# Patient Record
Sex: FEMALE | Race: BLACK OR AFRICAN AMERICAN | Employment: OTHER | URBAN - METROPOLITAN AREA
[De-identification: names, ages, dates, MRNs, and addresses within clinical notes are randomized per-mention and may not be internally consistent; named-entity substitution may affect disease eponyms.]

---

## 2017-02-03 ENCOUNTER — OFFICE VISIT (OUTPATIENT)
Dept: FAMILY MEDICINE CLINIC | Age: 72
End: 2017-02-03

## 2017-02-03 VITALS
WEIGHT: 222 LBS | TEMPERATURE: 98.8 F | HEIGHT: 65 IN | HEART RATE: 81 BPM | DIASTOLIC BLOOD PRESSURE: 80 MMHG | BODY MASS INDEX: 36.99 KG/M2 | SYSTOLIC BLOOD PRESSURE: 132 MMHG | RESPIRATION RATE: 18 BRPM | OXYGEN SATURATION: 97 %

## 2017-02-03 DIAGNOSIS — R60.9 DEPENDENT EDEMA: ICD-10-CM

## 2017-02-03 DIAGNOSIS — N18.30 CKD (CHRONIC KIDNEY DISEASE), STAGE III (HCC): ICD-10-CM

## 2017-02-03 DIAGNOSIS — I10 BENIGN ESSENTIAL HYPERTENSION: ICD-10-CM

## 2017-02-03 DIAGNOSIS — E11.9 DIABETES MELLITUS TYPE 2, NONINSULIN DEPENDENT (HCC): Primary | ICD-10-CM

## 2017-02-03 DIAGNOSIS — Z12.11 SCREENING FOR COLORECTAL CANCER: ICD-10-CM

## 2017-02-03 DIAGNOSIS — E78.00 HYPERCHOLESTEROLEMIA: ICD-10-CM

## 2017-02-03 DIAGNOSIS — Z12.12 SCREENING FOR COLORECTAL CANCER: ICD-10-CM

## 2017-02-03 NOTE — MR AVS SNAPSHOT
Visit Information Date & Time Provider Department Dept. Phone Encounter #  
 2/3/2017 10:00 AM Jamison Browning, [address] [phone] [id number] Upcoming Health Maintenance Date Due  
 MEDICARE YEARLY EXAM 8/5/2016 MICROALBUMIN Q1 2/9/2017 HEMOGLOBIN A1C Q6M 2/15/2017 EYE EXAM RETINAL OR DILATED Q1 5/5/2017 LIPID PANEL Q1 8/15/2017 FOOT EXAM Q1 2/3/2018 BREAST CANCER SCRN MAMMOGRAM 3/22/2018 GLAUCOMA SCREENING Q2Y 5/5/2018 COLONOSCOPY 6/4/2022 DTaP/Tdap/Td series (2 - Td) 2/3/2027 Allergies as of 2/3/2017  Review Complete On: 2/3/2017 By: Jamison Browning MD  
  
 Severity Noted Reaction Type Reactions Ace Inhibitors  02/28/2013    Other (comments) Renal insufficiency Hydrochlorothiazide  08/05/2015    Other (comments) Stopped d/t renal insufficiency, followed by Nephrologist  
  
Current Immunizations  Reviewed on 8/15/2016 Name Date Influenza High Dose Vaccine PF 12/7/2015 Pneumococcal Vaccine (Unspecified Type) 1/1/2010 Zoster 10/12/2012 Not reviewed this visit You Were Diagnosed With   
  
 Codes Comments Diabetes mellitus type 2, noninsulin dependent ([facility]    -  Primary ICD-10-CM: E11.9 ICD-9-CM: 250.00 Hypercholesterolemia     ICD-10-CM: E78.00 ICD-9-CM: 272.0 Benign essential hypertension     ICD-10-CM: I10 
ICD-9-CM: 401.1 CKD (chronic kidney disease), stage III     ICD-10-CM: N18.3 ICD-9-CM: 061. 3 Dependent edema     ICD-10-CM: R60.9 ICD-9-CM: 214. 3 Screening for colorectal cancer     ICD-10-CM: Z12.11, Z12.12 
ICD-9-CM: V76.51 Vitals BP Pulse Temp Resp Height(growth percentile) Weight(growth percentile) 132/80 81 98.8 °F (37.1 °C) (Oral) 18 5' 5\" (1.651 m) 222 lb (100.7 kg) LMP SpO2 BMI OB Status Smoking Status 07/25/2000 97% 36.94 kg/m2 Hysterectomy Never Smoker Vitals History BMI and BSA Data Body Mass Index Body Surface Area  
 36.94 kg/m 2 2.15 m 2 Preferred Pharmacy Pharmacy Name Phone Aki Castellanos ([id number] [facility] [phone] Your Updated Medication List  
  
   
This list is accurate as of: 2/3/17 10:52 AM.  Always use your most recent med list.  
  
  
  
  
 acetaminophen 500 mg tablet Commonly known as:  TYLENOL Take 500 mg by mouth daily as needed for Pain. amLODIPine 2.5 mg tablet Commonly known as:  Dave Chafe TAKE 1 TABLET EVERY DAY FOR HYPERTENSION  
  
 aspirin 81 mg tablet Take 81 mg by mouth daily. bumetanide 0.5 mg tablet Commonly known as:  Ernesto Hy Take 1 Tab by mouth daily as needed. Take with potassium rich food or beverage  Indications: EDEMA FISH OIL PO Take 1 Cap by mouth daily. metoprolol succinate 25 mg XL tablet Commonly known as:  TOPROL-XL  
TAKE 1 TABLET EVERY DAY FOR HYPERTENSION  
  
 VITAMIN D3 1,000 unit tablet Generic drug:  cholecalciferol Take  by mouth daily. We Performed the Following HEMOGLOBIN A1C WITH EAG [31519 CPT(R)]  DIABETES FOOT EXAM [HM7 Custom] MICROALBUMIN, UR, RAND W/ MICROALBUMIN/CREA RATIO [id number] CPT(R)] REFERRAL FOR COLONOSCOPY [FDN047 Custom] Comments:  
 Patient will call and schedule her 5 yr follow up colonoscopy w/ Dr Pamula Baumgarten Referral Information Referral ID Referred By Referred To  
  
 [id number] MYLENE MARS [facility] [address] [place] [address] Visits Status Start Date End Date 1 New Request 2/3/17 2/3/18 If your referral has a status of pending review or denied, additional information will be sent to support the outcome of this decision. Patient Instructions Learning About Diabetes Food Guidelines Your Care Instructions Meal planning is important to manage diabetes.  It helps keep your blood sugar at a target level (which you set with your doctor). You don't have to eat special foods. You can eat what your family eats, including sweets once in a while. But you do have to pay attention to how often you eat and how much you eat of certain foods. You may want to work with a dietitian or a certified diabetes educator (CDE) to help you plan meals and snacks. A dietitian or CDE can also help you lose weight if that is one of your goals. What should you know about eating carbs? Managing the amount of carbohydrate (carbs) you eat is an important part of healthy meals when you have diabetes. Carbohydrate is found in many foods. · Learn which foods have carbs. And learn the amounts of carbs in different foods. ¨ Bread, cereal, pasta, and rice have about 15 grams of carbs in a serving. A serving is 1 slice of bread (1 ounce), ½ cup of cooked cereal, or 1/3 cup of cooked pasta or rice. ¨ Fruits have 15 grams of carbs in a serving. A serving is 1 small fresh fruit, such as an apple or orange; ½ of a banana; ½ cup of cooked or canned fruit; ½ cup of fruit juice; 1 cup of melon or raspberries; or 2 tablespoons of dried fruit. ¨ Milk and no-sugar-added yogurt have 15 grams of carbs in a serving. A serving is 1 cup of milk or 2/3 cup of no-sugar-added yogurt. ¨ Starchy vegetables have 15 grams of carbs in a serving. A serving is ½ cup of mashed potatoes or sweet potato; 1 cup winter squash; ½ of a small baked potato; ½ cup of cooked beans; or ½ cup cooked corn or green peas. · Learn how much carbs to eat each day and at each meal. A dietitian or CDE can teach you how to keep track of the amount of carbs you eat. This is called carbohydrate counting. · If you are not sure how to count carbohydrate grams, use the Plate Method to plan meals. It is a good, quick way to make sure that you have a balanced meal. It also helps you spread carbs throughout the day. ¨ Divide your plate by types of foods. Put non-starchy vegetables on half the plate, meat or other protein food on one-quarter of the plate, and a grain or starchy vegetable in the final quarter of the plate. To this you can add a small piece of fruit and 1 cup of milk or yogurt, depending on how many carbs you are supposed to eat at a meal. 
· Try to eat about the same amount of carbs at each meal. Do not \"save up\" your daily allowance of carbs to eat at one meal. 
· Proteins have very little or no carbs per serving. Examples of proteins are beef, chicken, turkey, fish, eggs, tofu, cheese, cottage cheese, and peanut butter. A serving size of meat is 3 ounces, which is about the size of a deck of cards. Examples of meat substitute serving sizes (equal to 1 ounce of meat) are 1/4 cup of cottage cheese, 1 egg, 1 tablespoon of peanut butter, and ½ cup of tofu. How can you eat out and still eat healthy? · Learn to estimate the serving sizes of foods that have carbohydrate. If you measure food at home, it will be easier to estimate the amount in a serving of restaurant food. · If the meal you order has too much carbohydrate (such as potatoes, corn, or baked beans), ask to have a low-carbohydrate food instead. Ask for a salad or green vegetables. · If you use insulin, check your blood sugar before and after eating out to help you plan how much to eat in the future. · If you eat more carbohydrate at a meal than you had planned, take a walk or do other exercise. This will help lower your blood sugar. What else should you know? · Limit saturated fat, such as the fat from meat and dairy products. This is a healthy choice because people who have diabetes are at higher risk of heart disease. So choose lean cuts of meat and nonfat or low-fat dairy products. Use olive or canola oil instead of butter or shortening when cooking. · Don't skip meals.  Your blood sugar may drop too low if you skip meals and take insulin or certain medicines for diabetes. · Check with your doctor before you drink alcohol. Alcohol can cause your blood sugar to drop too low. Alcohol can also cause a bad reaction if you take certain diabetes medicines. Follow-up care is a key part of your treatment and safety. Be sure to make and go to all appointments, and call your doctor if you are having problems. It's also a good idea to know your test results and keep a list of the medicines you take. Where can you learn more? Go to http://yumi-raquel.info/. Enter X280 in the search box to learn more about \"Learning About Diabetes Food Guidelines. \" Current as of: May 23, 2016 Content Version: 11.1 © 4287-3234 [employer]. Care instructions adapted under license by [employer] (which disclaims liability or warranty for this information). If you have questions about a medical condition or this instruction, always ask your healthcare professional. [address] any warranty or liability for your use of this information. Introducing [facility]! Ania White introduces [employer] patient portal. Now you can access parts of your medical record, email your doctor's office, and request medication refills online. 1. In your internet browser, go to [email or web address] 2. Click on the First Time User? Click Here link in the Sign In box. You will see the New Member Sign Up page. 3. Enter your [employer] Access Code exactly as it appears below. You will not need to use this code after youve completed the sign-up process. If you do not sign up before the expiration date, you must request a new code. · [employer] Access Code: [email or web address] Expires: 5/4/2017 10:35 AM 
 
4. Enter the last four digits of your Social Security Number (xxxx) and Date of Birth (mm/dd/yyyy) as indicated and click Submit. You will be taken to the next sign-up page. 5. Create a [employer] ID. This will be your [employer] login ID and cannot be changed, so think of one that is secure and easy to remember. 6. Create a [employer] password. You can change your password at any time. 7. Enter your Password Reset Question and Answer. This can be used at a later time if you forget your password. 8. Enter your e-mail address. You will receive e-mail notification when new information is available in [address] 9. Click Sign Up. You can now view and download portions of your medical record. 10. Click the Download Summary menu link to download a portable copy of your medical information. If you have questions, please visit the Frequently Asked Questions section of the [employer] website. Remember, [employer] is NOT to be used for urgent needs. For medical emergencies, dial 911. Now available from your iPhone and Android! Please provide this summary of care documentation to your next provider. Your primary care clinician is listed as MYLENE MARS. If you have any questions after today's visit, please call [phone].

## 2017-02-03 NOTE — LETTER
February 3, 2017 Amna Phelps [address] [place] [address] Dear Yosi Gift: 
Thank you for requesting access to [employer] Please follow the instructions below to securely access and download your online medical record. [employer] allows you to send messages to your doctor, view your test results, renew your prescriptions, schedule appointments, and more. How Do I Sign Up? 1. In your internet browser, go to [email or web address]. 2. Click on the First Time User? Click Here link in the Sign In box. You will see the New Member Sign Up page. 3. Enter your [employer] Access Code exactly as it appears below. You will not need to use this code after youve completed the sign-up process. If you do not sign up before the expiration date, you must request a new code. [employer] Access Code: [email or web address] Expires: 5/4/2017 10:35 AM  
 
4. Enter the last four digits of your Social Security Number (xxxx) and Date of Birth (mm/dd/yyyy) as indicated and click Submit. You will be taken to the next sign-up page. 5. Create a [employer] ID. This will be your [employer] login ID and cannot be changed, so think of one that is secure and easy to remember. 6. Create a [employer] password. You can change your password at any time. 7. Enter your Password Reset Question and Answer. This can be used at a later time if you forget your password. 8. Enter your e-mail address. You will receive e-mail notification when new information is available in [address] 9. Click Sign Up. You can now view and download portions of your medical record. 10. Click the Download Summary menu link to download a portable copy of your medical information. Additional Information If you have questions, please visit the Frequently Asked Questions section of the [employer] website at [email or web address] Remember, [employer] is NOT to be used for urgent needs. For medical emergencies, dial 911. Now available from your iPhone and Android! Sincerely, [employer]

## 2017-02-03 NOTE — PATIENT INSTRUCTIONS

## 2017-02-03 NOTE — PROGRESS NOTES
HISTORY OF PRESENT ILLNESS  Brock Paris is a 66 y.o. female. HPI  Fasting follow up diabetes, cholesterol, hypertension, labs and medication check. Does not check BS's. Not doing anything special w/ diet or exercise. Wt is unchanged. BP is up some for her today. She only takes the Bumex once about every 2 weeks now bc she doesn't like the inconvenience of having to urinate so much when she takes it. Her feet and ankles are puffy  Review of Systems   Eyes: Negative. Respiratory: Negative for cough and shortness of breath. Cardiovascular: Negative for chest pain and palpitations. Gastrointestinal: Negative. Negative for abdominal pain, constipation, diarrhea, nausea and vomiting. Genitourinary: Negative. Musculoskeletal: Negative for myalgias. Neurological: Negative for dizziness, tingling, sensory change and headaches.      Problem List  Date Reviewed: 2/3/2017          Codes Class Noted    Diabetes mellitus type 2, noninsulin dependent ([facility] ICD-10-CM: E11.9  ICD-9-CM: 250.00  8/15/2016    Overview Signed 8/15/2016 10:36 AM by Isabelle Carballo MD     5/2012, HgbA1c 6.5             Benign essential hypertension ICD-10-CM: I10  ICD-9-CM: 401.1  5/16/2016    Overview Signed 5/16/2016 11:03 AM by Isabelle Carballo MD     DX 4/09             CKD (chronic kidney disease), stage III ICD-10-CM: N18.3  ICD-9-CM: 585.3  2/28/2013    Overview Addendum 2/9/2016 10:57 AM by Isabelle Carballo MD     2012; Dr Lidia Drake al; Renal US 2/2013             Vitamin D deficiency ICD-10-CM: E55.9  ICD-9-CM: 268.9  2/28/2013    Overview Signed 2/28/2013  9:25 AM by Isabelle Carballo MD     1/2013; weekly rx vit D per Nephrology             Dyspepsia & Heartburn ICD-10-CM: R10.13  ICD-9-CM: 536.8  5/16/2012    Overview Signed 5/16/2012  9:00 AM by Isabelle Carballo MD     2012, cardiac w/u negative; improved w/ Pepcid             Impaired fasting glucose ICD-10-CM: R73.01  ICD-9-CM: 790.21 2012    Overview Signed 2012  9:07 AM by Shelli Gannon MD                  Chest pain, unspecified ICD-10-CM: R07.9  ICD-9-CM: 786.50  2012    Overview Signed 2012  9:00 AM by Shelli Gannon MD     2012, Dr Stephanie Ortega; Cardiac cath negative, GI related             Hypercholesterolemia ICD-10-CM: E78.00  ICD-9-CM: 272.0  2011        Mild renal insufficiency ICD-10-CM: N28.9  ICD-9-CM: 593.9  2011    Overview Signed 2013  9:22 AM by Shelli Gannon MD                  Bilateral Dependent Foot & Ankle Edema, L>R ICD-10-CM: R60.9  ICD-9-CM: 782.3  2010         (normal spontaneous vaginal delivery) x3 ICD-10-CM: O80  ICD-9-CM: 650  Unknown    Overview Signed 5/3/2010 10:46 AM by Shelli Gannon MD      x4, 1 fetal demise after delivery at 11mos             Miscarriage x 2 ICD-10-CM: O03.9  ICD-9-CM: 634.90  Unknown        S/P D&C (status post dilation and curettage) ICD-10-CM: L51.144  ICD-9-CM: V45.89  Unknown    Overview Signed 2010 12:08 PM by Shelli Gannon MD     X1, SAB  x1, AUB             S/p laparoscopic cholecystectomy gallstones ICD-10-CM: Z90.49  ICD-9-CM: V45.89  Unknown    Overview Signed 5/3/2010 10:48 AM by Shelli Gannon MD                  S/p Rt bunionectomy ICD-10-CM: N38.387  ICD-9-CM: V45.89  Unknown        Lt Ankle edema, s/p surgery ICD-10-CM: M25.473  ICD-9-CM: 176. 3  Unknown    Overview Signed 5/3/2010 10:49 AM by Shelli Gannon MD     Early 0360'O                 Past Surgical History   Procedure Laterality Date    Hx partial hysterectomy  ~     AUB, benign disease    Hx knee replacement  2010     Right TKR; Dr Tamica Kiran Hx dilation and curettage  x2     SAB x 1, AUB x1    Pr lap,cholecystectomy       gallstones    Hx bunionectomy Right      right    Pr leg/ankle surgery proc unlisted       left ankle surgery    Colonoscopy  ~     normal, f/u 10 yrs; Dr Tonio Bernal mammogram screen bilat  annually     [facility]    Cardiac catheterization  1/27/2012     normal, Dr Anderson, but given NTG just in case to use prn    Colonoscopy  06/04/2012     Dr Frank Brown, ok per pt except diverticulosis, small benign polyp; repeat 5 yrs    Hx bunionectomy Left 12/15/2015     Dr. Rachell Loaiza       Current Outpatient Prescriptions   Medication Sig    metoprolol succinate (TOPROL-XL) 25 mg XL tablet TAKE 1 TABLET EVERY DAY FOR HYPERTENSION    amLODIPine (NORVASC) 2.5 mg tablet TAKE 1 TABLET EVERY DAY FOR HYPERTENSION    bumetanide (BUMEX) 0.5 mg tablet Take 1 Tab by mouth daily as needed. Take with potassium rich food or beverage  Indications: EDEMA    acetaminophen (TYLENOL) 500 mg tablet Take 500 mg by mouth daily as needed for Pain.  cholecalciferol (VITAMIN D3) 1,000 unit tablet Take  by mouth daily.  DOCOSAHEXANOIC ACID/EPA (FISH OIL PO) Take 1 Cap by mouth daily.  aspirin 81 mg tablet Take 81 mg by mouth daily. No current facility-administered medications for this visit.       Allergies   Allergen Reactions    Ace Inhibitors Other (comments)     Renal insufficiency    Hydrochlorothiazide Other (comments)     Stopped d/t renal insufficiency, followed by Nephrologist     Social History     Social History    Marital status:      Spouse name: N/A    Number of children: 3    Years of education: N/A     Occupational History    Retired [employer]      Social History Main Topics    Smoking status: Never Smoker    Smokeless tobacco: Never Used    Alcohol use Yes      Comment: rarely    Drug use: No    Sexual activity: Yes     Partners: Male     Other Topics Concern     Service No    Blood Transfusions No    Caffeine Concern No     1 bottle of 16 oz of Pepsi a day; this is down considerably for her    Occupational Exposure No    Hobby Hazards No    Sleep Concern No    Stress Concern No    Weight Concern No    Special Diet No    Back Care No    Exercise No    Bike Helmet Yes    Seat Belt Yes    Self-Exams Yes     Social History Narrative    Lives at home w/ her , son and grand daughter (baby), 2 story home     Immunization History   Administered Date(s) Administered    Influenza High Dose Vaccine PF 2015    Pneumococcal Vaccine (Unspecified Type) 2010    Zoster 10/12/2012       Family History   Problem Relation Age of Onset    Hypertension Mother     Arthritis-osteo Mother      B TKR   [facility] Arthritis-rheumatoid Mother      juvenile    Breast Cancer Mother 80    Other Mother      Diverticulitis    Hypertension Father    [facility] Diabetes Father       age 80 kidney failure; dialysis pt    Diabetes Sister     Cancer Brother       age 46 \"bone cancer\"    Alcohol abuse Brother       of cirrhosis age 58    Heart Disease Maternal Grandmother       at 80    Heart Disease Paternal Grandmother       at 80    Heart Attack Paternal Grandfather       in his 66's   [facility] Anesth Problems Neg Hx      Visit Vitals    /82 (BP 1 Location: Left arm, BP Patient Position: Sitting) Repeated end of exam 132/80    Pulse 81    Temp 98.8 °F (37.1 °C) (Oral)    Resp 18    Ht 5' 5\" (1.651 m)    Wt 222 lb (100.7 kg)    LMP 2000    SpO2 97%    BMI 36.94 kg/m2       Physical Exam   Constitutional: No distress. Neck: Carotid bruit is not present. Cardiovascular: Normal rate, regular rhythm and normal heart sounds. No murmur heard. Pulmonary/Chest: Effort normal and breath sounds normal. No respiratory distress. Abdominal: Soft. Bowel sounds are normal. She exhibits no distension and no mass. There is no tenderness. Musculoskeletal:   Moderate non pitting B foot edema, mild pitting edema B ankles. Skin warm, intact, no lesions or wounds. Strong distal pulses. Normal monofilament testing. Vitals reviewed. ASSESSMENT and PLAN    ICD-10-CM ICD-9-CM    1. Diabetes mellitus type 2, noninsulin dependent (HCC) E11.9 250.00 HM DIABETES FOOT EXAM      HEMOGLOBIN A1C WITH EAG      MICROALBUMIN, UR, RAND W/ MICROALBUMIN/CREA RATIO      URINALYSIS W/MICROSCOPIC   2. Hypercholesterolemia E78.00 272.0 LIPID PANEL      ALT      AST   3. Benign essential hypertension I10 401.1 URINALYSIS W/MICROSCOPIC      TSH 3RD GENERATION      METABOLIC PANEL, BASIC      CBC W/O DIFF   4. CKD (chronic kidney disease), stage III N18.3 585. 3 URINALYSIS W/MICROSCOPIC      METABOLIC PANEL, BASIC   5. Bilateral Dependent Foot & Ankle Edema, L>R R60.9 782.3    6.  Screening for colorectal cancer Z12.11 V76.51 REFERRAL FOR COLONOSCOPY: Patient scheduling her 5 yr follow up w/ Dr Elie Kerns due 6-2017    Z12.12       Fasting labs today  Reviewed diet, nutrition, exercise and weight control  Cardiovascular risk and specific lipid/LDL/BS/HgBA1c/BP goals reviewed  Reviewed medications and side effects in detail  Further follow up & other recommendations pending review of labs as well  If all remains good and stable, routine check 6months, sooner prn

## 2017-02-03 NOTE — PROGRESS NOTES
Chief Complaint   Patient presents with    Hypertension     fasting follow up    Diabetes    Cholesterol Problem     1. Have you been to the ER, urgent care clinic since your last visit? Hospitalized since your last visit? No    2. Have you seen or consulted any other health care providers outside of the [address] [place] [address] since your last visit? Include any pap smears or colon screening.  No

## 2017-02-04 LAB
ALBUMIN/CREAT UR: 43.8 MG/G CREAT (ref 0–30)
ALT SERPL-CCNC: 17 IU/L (ref 0–32)
APPEARANCE UR: CLEAR
AST SERPL-CCNC: 21 IU/L (ref 0–40)
BACTERIA #/AREA URNS HPF: NORMAL /[HPF]
BILIRUB UR QL STRIP: NEGATIVE
BUN SERPL-MCNC: 14 MG/DL (ref 8–27)
BUN/CREAT SERPL: 15 (ref 11–26)
CALCIUM SERPL-MCNC: 9.7 MG/DL (ref 8.7–10.3)
CASTS URNS QL MICRO: NORMAL /LPF
CHLORIDE SERPL-SCNC: 101 MMOL/L (ref 96–106)
CHOLEST SERPL-MCNC: 177 MG/DL (ref 100–199)
CO2 SERPL-SCNC: 26 MMOL/L (ref 18–29)
COLOR UR: YELLOW
CREAT SERPL-MCNC: 0.92 MG/DL (ref 0.57–1)
CREAT UR-MCNC: 151.5 MG/DL
EPI CELLS #/AREA URNS HPF: NORMAL /HPF
ERYTHROCYTE [DISTWIDTH] IN BLOOD BY AUTOMATED COUNT: 14.3 % (ref 12.3–15.4)
EST. AVERAGE GLUCOSE BLD GHB EST-MCNC: 143 MG/DL
GLUCOSE SERPL-MCNC: 99 MG/DL (ref 65–99)
GLUCOSE UR QL: NEGATIVE
HBA1C MFR BLD: 6.6 % (ref 4.8–5.6)
HCT VFR BLD AUTO: 38.6 % (ref 34–46.6)
HDLC SERPL-MCNC: 61 MG/DL
HGB BLD-MCNC: 12.6 G/DL (ref 11.1–15.9)
HGB UR QL STRIP: NEGATIVE
INTERPRETATION, 910389: NORMAL
INTERPRETATION: NORMAL
INTERPRETATION: NORMAL
KETONES UR QL STRIP: NEGATIVE
LDLC SERPL CALC-MCNC: 100 MG/DL (ref 0–99)
LEUKOCYTE ESTERASE UR QL STRIP: NEGATIVE
Lab: NORMAL
MCH RBC QN AUTO: 29.1 PG (ref 26.6–33)
MCHC RBC AUTO-ENTMCNC: 32.6 G/DL (ref 31.5–35.7)
MCV RBC AUTO: 89 FL (ref 79–97)
MICRO URNS: NORMAL
MICRO URNS: NORMAL
MICROALBUMIN UR-MCNC: 66.4 UG/ML
MUCOUS THREADS URNS QL MICRO: PRESENT
NITRITE UR QL STRIP: NEGATIVE
PDF IMAGE, 910387: NORMAL
PH UR STRIP: 5.5 [PH] (ref 5–7.5)
PLATELET # BLD AUTO: 296 X10E3/UL (ref 150–379)
POTASSIUM SERPL-SCNC: 4.6 MMOL/L (ref 3.5–5.2)
PROT UR QL STRIP: NEGATIVE
RBC # BLD AUTO: 4.33 X10E6/UL (ref 3.77–5.28)
RBC #/AREA URNS HPF: NORMAL /HPF
SODIUM SERPL-SCNC: 144 MMOL/L (ref 134–144)
SP GR UR: 1.02 (ref 1–1.03)
TRIGL SERPL-MCNC: 81 MG/DL (ref 0–149)
TSH SERPL DL<=0.005 MIU/L-ACNC: 1.23 UIU/ML (ref 0.45–4.5)
UROBILINOGEN UR STRIP-MCNC: 0.2 MG/DL (ref 0.2–1)
VLDLC SERPL CALC-MCNC: 16 MG/DL (ref 5–40)
WBC # BLD AUTO: 10.6 X10E3/UL (ref 3.4–10.8)
WBC #/AREA URNS HPF: NORMAL /HPF

## 2017-02-22 NOTE — PROGRESS NOTES
Blood counts, liver, kidney, thyroid all normal.  Lipids overall good and stable, HDL remains good and at goal  HgbA1c has worsened from last check, went up from 6.3 to 6.6 which is the highest reading she has had the last several checks. Try to follow some simple nutrition/health tips: including avoiding concentrated sweets, carbohydrate restriction between 30-40g carbs max per meal, 15 grams carbs max per snack, not skipping meals, eating low fat/high protein snacks between meals, getting regular cardio/aerobic exercise at least 3 x a week x 30 minutes to one hour. Follow up 3-4 months to keep a closer watch since the HgBA1c is going up.

## 2017-03-27 LAB — MAMMOGRAPHY, EXTERNAL: NORMAL

## 2017-05-02 ENCOUNTER — OFFICE VISIT (OUTPATIENT)
Dept: FAMILY MEDICINE CLINIC | Age: 72
End: 2017-05-02

## 2017-05-02 VITALS
RESPIRATION RATE: 18 BRPM | BODY MASS INDEX: 36.09 KG/M2 | HEART RATE: 76 BPM | OXYGEN SATURATION: 96 % | WEIGHT: 216.6 LBS | SYSTOLIC BLOOD PRESSURE: 118 MMHG | HEIGHT: 65 IN | DIASTOLIC BLOOD PRESSURE: 78 MMHG | TEMPERATURE: 97.9 F

## 2017-05-02 DIAGNOSIS — Z71.89 ACP (ADVANCE CARE PLANNING): ICD-10-CM

## 2017-05-02 DIAGNOSIS — Z00.00 ROUTINE GENERAL MEDICAL EXAMINATION AT A HEALTH CARE FACILITY: Primary | ICD-10-CM

## 2017-05-02 DIAGNOSIS — Z23 NEED FOR VACCINATION WITH 13-POLYVALENT PNEUMOCOCCAL CONJUGATE VACCINE: ICD-10-CM

## 2017-05-02 DIAGNOSIS — E78.00 HYPERCHOLESTEROLEMIA: ICD-10-CM

## 2017-05-02 DIAGNOSIS — E11.9 DIABETES MELLITUS TYPE 2, NONINSULIN DEPENDENT (HCC): ICD-10-CM

## 2017-05-02 DIAGNOSIS — Z12.11 SCREENING FOR COLORECTAL CANCER: ICD-10-CM

## 2017-05-02 DIAGNOSIS — I10 BENIGN ESSENTIAL HYPERTENSION: ICD-10-CM

## 2017-05-02 DIAGNOSIS — N18.30 CKD (CHRONIC KIDNEY DISEASE), STAGE III (HCC): ICD-10-CM

## 2017-05-02 DIAGNOSIS — Z12.12 SCREENING FOR COLORECTAL CANCER: ICD-10-CM

## 2017-05-02 NOTE — PROGRESS NOTES
HISTORY OF PRESENT ILLNESS  Olamide Medina is a 74 y.o. female. HPI  Fasting follow up diabetes, cholesterol, hypertension, labs and med check. Overall getting along well and doing well on current medication regimen, tolerating w/o reaction or side effects. Does not check BS's per personal preference. Trying to make healthier food choices, better portion control, and gave up carbs for lent. Wt down 6 lbs. Review of Systems   Constitutional: Negative. Eyes: Negative. Respiratory: Negative. Cardiovascular: Negative for chest pain, palpitations and claudication. Ankle edema well controlled w/ prn Bumex which she takes ~ 3 x a week   Gastrointestinal: Negative. Neurological: Negative for dizziness, tingling and headaches. Endo/Heme/Allergies: Negative for polydipsia. Problem List  Date Reviewed: 5/2/2017          Codes Class Noted    ACP (advance care planning) ICD-10-CM: Z71.89  ICD-9-CM: V65.49  5/2/2017    Overview Signed 5/2/2017  8:33 AM by Boyd Hussein MD     Initial ACP discussion [id number]. AMD form reviewed and copy provided.  NN/facilitator to discuss with patient               Diabetes mellitus type 2, noninsulin dependent ([facility] [address] ICD-10-CM: E11.9  ICD-9-CM: 250.00  8/15/2016    Overview Signed 8/15/2016 10:36 AM by Boyd Hussein MD     5/2012, HgbA1c 6.5             Benign essential hypertension ICD-10-CM: I10  ICD-9-CM: 401.1  5/16/2016    Overview Signed 5/16/2016 11:03 AM by Boyd Hussein MD     DX 4/09             CKD (chronic kidney disease), stage III ICD-10-CM: N18.3  ICD-9-CM: 585.3  2/28/2013    Overview Addendum 2/9/2016 10:57 AM by Boyd Hussein MD     2012; Dr Zeenat Mariscal al; Renal US 2/2013             Vitamin D deficiency ICD-10-CM: E55.9  ICD-9-CM: 268.9  2/28/2013    Overview Signed 2/28/2013  9:25 AM by Boyd Hussein MD     1/2013; weekly rx vit D per Nephrology             Dyspepsia & Heartburn ICD-10-CM: R10.13  ICD-9-CM: 536.8  2012    Overview Signed 2012  9:00 AM by Marcus Erickson MD     , cardiac w/u negative; improved w/ Pepcid             Impaired fasting glucose ICD-10-CM: R73.01  ICD-9-CM: 790.21  2012    Overview Signed 2012  9:07 AM by Marcus Erickson MD                  Chest pain, unspecified ICD-10-CM: R07.9  ICD-9-CM: 786.50  2012    Overview Signed 2012  9:00 AM by Marcus Erickson MD     2012, Dr Marylen Schilder; Cardiac cath negative, GI related             Hypercholesterolemia ICD-10-CM: E78.00  ICD-9-CM: 272.0  2011        Mild renal insufficiency ICD-10-CM: N28.9  ICD-9-CM: 593.9  2011    Overview Signed 2013  9:22 AM by Marcus Erickson MD                  Bilateral Dependent Foot & Ankle Edema, L>R ICD-10-CM: R60.9  ICD-9-CM: 782.3  2010         (normal spontaneous vaginal delivery) x3 ICD-10-CM: O80  ICD-9-CM: 650  Unknown    Overview Signed 5/3/2010 10:46 AM by Marcus Erickson MD      x4, 1 fetal demise after delivery at 11mos             Miscarriage x 2 ICD-10-CM: O03.9  ICD-9-CM: 634.90  Unknown        S/P D&C (status post dilation and curettage) ICD-10-CM: E49.143  ICD-9-CM: V45.89  Unknown    Overview Signed 2010 12:08 PM by Marcus Erickson MD     X1, SAB  x1, AUB             S/p laparoscopic cholecystectomy gallstones ICD-10-CM: Z90.49  ICD-9-CM: V45.89  Unknown    Overview Signed 5/3/2010 10:48 AM by Marcus Erickson MD                  S/p Rt bunionectomy ICD-10-CM: K41.579  ICD-9-CM: V45.89  Unknown        Lt Ankle edema, s/p surgery ICD-10-CM: M25.473  ICD-9-CM: 719.07  Unknown    Overview Signed 5/3/2010 10:49 AM by Marcus Erickson MD     Early 1101'J                 Past Surgical History:   Procedure Laterality Date    CARDIAC CATHETERIZATION  2012    normal, Dr Marylen Schilder, but given NTG just in case to use prn    COLONOSCOPY  ~2000    normal, f/u 10 yrs;   Donavan Cerrato COLONOSCOPY  2012    Dr Deng Olvera, ok per pt except diverticulosis, small benign polyp; repeat 5 yrs    HX BUNIONECTOMY Right     right    HX BUNIONECTOMY Left 12/15/2015    Dr. Master White  x2    SAB x 1, AUB x1    HX KNEE REPLACEMENT  2010    Right TKR; Dr Urbano Merlos  ~    AUB, benign disease    LAP,CHOLECYSTECTOMY      gallstones    LEG/ANKLE SURGERY PROC UNLISTED      left ankle surgery    JARRELL MAMMOGRAM SCREEN BILAT  annually    [address]     OB History      Para Term  AB TAB SAB Ectopic Multiple Living    6 4   2  2             Current Outpatient Prescriptions   Medication Sig    pneumococcal 13 annie conj dip (PREVNAR-13) 0.5 mL syrg injection 0.5 mL by IntraMUSCular route once for 1 dose. PLEASE FAX VERIFICATION -[id number]    metoprolol succinate (TOPROL-XL) 25 mg XL tablet TAKE 1 TABLET EVERY DAY FOR HYPERTENSION    amLODIPine (NORVASC) 2.5 mg tablet TAKE 1 TABLET EVERY DAY FOR HYPERTENSION    bumetanide (BUMEX) 0.5 mg tablet Take 1 Tab by mouth daily as needed. Take with potassium rich food or beverage  Indications: EDEMA    acetaminophen (TYLENOL) 500 mg tablet Take 500 mg by mouth daily as needed for Pain.  cholecalciferol (VITAMIN D3) 1,000 unit tablet Take  by mouth daily.  DOCOSAHEXANOIC ACID/EPA (FISH OIL PO) Take 1 Cap by mouth daily.  aspirin 81 mg tablet Take 81 mg by mouth daily. No current facility-administered medications for this visit.       Allergies   Allergen Reactions    Ace Inhibitors Other (comments)     Renal insufficiency    Hydrochlorothiazide Other (comments)     Stopped d/t renal insufficiency, followed by Nephrologist     Social History     Social History    Marital status:      Spouse name: N/A    Number of children: 3    Years of education: N/A     Occupational History    Retired [employer] History Main Topics    Smoking status: Never Smoker    Smokeless tobacco: Never Used    Alcohol use Yes      Comment: rarely    Drug use: No    Sexual activity: Yes     Partners: Male     Other Topics Concern     Service No    Blood Transfusions No    Caffeine Concern No     1 bottle of 16 oz of Pepsi a day; this is down considerably for her    Occupational Exposure No    Hobby Hazards No    Sleep Concern No    Stress Concern No    Weight Concern No    Special Diet No     more portion control and trying to make healthier food choices    Back Care No    Exercise No    Bike Helmet Yes    Seat Belt Yes    Self-Exams Yes     Social History Narrative    Lives at home w/ her , son and grand daughter (baby), 2 story home     Immunization History   Administered Date(s) Administered    Influenza High Dose Vaccine PF 2015    Pneumococcal Vaccine (Unspecified Type) 2010    Zoster 10/12/2012       Family History   Problem Relation Age of Onset    Hypertension Mother     Arthritis-osteo Mother      B TKR   [place] Arthritis-rheumatoid Mother      juvenile    Breast Cancer Mother 80    Other Mother      Diverticulitis    Hypertension Father    [place] Diabetes Father       age 80 kidney failure; dialysis pt    Diabetes Sister     Cancer Brother       age 46 \"bone cancer\"    Alcohol abuse Brother       of cirrhosis age 58    Heart Disease Maternal Grandmother       at 80    Heart Disease Paternal Grandmother       at 80    Heart Attack Paternal Grandfather       in his 66's   [place] Anesth Problems Neg Hx      Visit Vitals    /78 (BP 1 Location: Left arm, BP Patient Position: Sitting)    Pulse 76    Temp 97.9 °F (36.6 °C) (Oral)    Resp 18    Ht 5' 5\" (1.651 m)    Wt 216 lb 9.6 oz (98.2 kg)    LMP 2000    SpO2 96%    BMI 36.04 kg/m2         Physical Exam   Constitutional: She is oriented to person, place, and time. No distress. Cardiovascular: Normal rate, regular rhythm and normal heart sounds. No murmur heard. Pulmonary/Chest: Effort normal and breath sounds normal. No respiratory distress. Abdominal: Soft. Bowel sounds are normal. She exhibits no distension and no mass. There is no tenderness. Musculoskeletal: She exhibits no tenderness. Moderate non pitting B ankle edema, skin warm, pulses strong   Neurological: She is alert and oriented to person, place, and time. Coordination normal.   Vitals reviewed. ASSESSMENT and PLAN      Routine general medical examination at a health care facility  Medicare AWV,     ACP (advance care planning)  Initial ACP discussion. AMD form reviewed and copy provided. NN/facilitator to discuss with patient      Diabetes mellitus type 2, noninsulin dependent ([facility] [address]  -     METABOLIC PANEL, BASIC  -     HEMOGLOBIN A1C WITH EAG    Hypercholesterolemia  -     LIPID PANEL    Benign essential hypertension    CKD (chronic kidney disease), stage III  -     METABOLIC PANEL, BASIC    Need for vaccination with 13-polyvalent pneumococcal conjugate vaccine  -     pneumococcal 13 annie conj dip (PREVNAR-13) 0.5 mL syrg injection; 0.5 mL by IntraMUSCular route once for 1 dose.  PLEASE FAX VERIFICATION TO [id number]    Screening for colorectal cancer  -     REFERRAL FOR COLONOSCOPY: pt scheduling 5 yr follow up w/ Dr Yvrose Snow due next month      Reviewed diet, exercise and weight control  Cardiovascular risk and specific lipid/LDL/BS/HgBA1c goals reviewed; commended on modifications she has made thus far  Reviewed medications and side effects in detail  Further follow up & other recommendations pending review of labs as well  If all remains good and stable, routine follow up 6months

## 2017-05-02 NOTE — ACP (ADVANCE CARE PLANNING)
Initial ACP discussion. AMD form reviewed and copy provided.  NN/facilitator to discuss with patient

## 2017-05-02 NOTE — PROGRESS NOTES
Chief Complaint   Patient presents with    Hypertension     3 month fasting follow up     Diabetes    Cholesterol Problem     1. Have you been to the ER, urgent care clinic since your last visit? Hospitalized since your last visit? No    2. Have you seen or consulted any other health care providers outside of the [address] [place] [address] since your last visit? Include any pap smears or colon screening.  No

## 2017-05-02 NOTE — PROGRESS NOTES
Bryan Andino is a 74 y.o. female and presents for annual Medicare Wellness Visit. Problem List: Reviewed with patient and discussed risk factors.     Patient Active Problem List   Diagnosis Code     (normal spontaneous vaginal delivery) x3 O80    Miscarriage x 2 O03.9    S/P D&C (status post dilation and curettage) Z98.890    S/p laparoscopic cholecystectomy gallstones Z90.49    S/p Rt bunionectomy Z98.890    Lt Ankle edema, s/p surgery M25.473    Bilateral Dependent Foot & Ankle Edema, L>R R60.9    Hypercholesterolemia E78.00    Mild renal insufficiency N28.9    Chest pain, unspecified R07.9    Dyspepsia & Heartburn R10.13    Impaired fasting glucose R73.01    CKD (chronic kidney disease), stage III N18.3    Vitamin D deficiency E55.9    Benign essential hypertension I10    Diabetes mellitus type 2, noninsulin dependent (HCC) E11.9    ACP (advance care planning) Z71.89       Current medical providers:  Patient Care Team:  Maddie Arvizu MD as PCP - Leena Knox MD (Nephrology)    PSH: Reviewed with patient  Past Surgical History:   Procedure Laterality Date    CARDIAC CATHETERIZATION  2012    normal, Dr Ingris Rios, but given NTG just in case to use prn    COLONOSCOPY  ~    normal, f/u 10 yrs; Dr Abe Diaz  2012    Dr Ulises Rivera, ok per pt except diverticulosis, small benign polyp; repeat 5 yrs    HX BUNIONECTOMY Right     right    HX BUNIONECTOMY Left 12/15/2015    Dr. Betzaida Cifuentes' [place]    HX Micky Coffee  x2    SAB x 1, AUB x1    HX KNEE REPLACEMENT  2010    Right TKR; Dr Trev Goldstein  ~    AUB, benign disease    LAP,CHOLECYSTECTOMY      gallstones    LEG/ANKLE SURGERY PROC UNLISTED  's    left ankle surgery    JARRELL MAMMOGRAM SCREEN BILAT  annually    [facility]        SH: Reviewed with patient  Social History   Substance Use Topics    Smoking status: Never Smoker    Smokeless tobacco: Never Used    Alcohol use Yes      Comment: rarely       FH: Reviewed with patient  Family History   Problem Relation Age of Onset    Hypertension Mother     Arthritis-osteo Mother      B TKR   [facility] Arthritis-rheumatoid Mother      juvenile    Breast Cancer Mother 80    Other Mother      Diverticulitis    Hypertension Father    [facility] Diabetes Father       age 80 kidney failure; dialysis pt    Diabetes Sister     Cancer Brother       age 46 \"bone cancer\"    Alcohol abuse Brother       of cirrhosis age 58    Heart Disease Maternal Grandmother       at 80    Heart Disease Paternal Grandmother       at 80    Heart Attack Paternal Grandfather       in his 66's   [facility] Anesth Problems Neg Hx        Medications/Allergies: Reviewed with patient  Current Outpatient Prescriptions on File Prior to Visit   Medication Sig Dispense Refill    metoprolol succinate (TOPROL-XL) 25 mg XL tablet TAKE 1 TABLET EVERY DAY FOR HYPERTENSION 90 Tab 3    amLODIPine (NORVASC) 2.5 mg tablet TAKE 1 TABLET EVERY DAY FOR HYPERTENSION 90 Tab 3    bumetanide (BUMEX) 0.5 mg tablet Take 1 Tab by mouth daily as needed. Take with potassium rich food or beverage  Indications: EDEMA 30 Tab 2    acetaminophen (TYLENOL) 500 mg tablet Take 500 mg by mouth daily as needed for Pain.  cholecalciferol (VITAMIN D3) 1,000 unit tablet Take  by mouth daily.  DOCOSAHEXANOIC ACID/EPA (FISH OIL PO) Take 1 Cap by mouth daily.  aspirin 81 mg tablet Take 81 mg by mouth daily. No current facility-administered medications on file prior to visit.        Allergies   Allergen Reactions    Ace Inhibitors Other (comments)     Renal insufficiency    Hydrochlorothiazide Other (comments)     Stopped d/t renal insufficiency, followed by Nephrologist       Objective:  Visit Vitals    /78 (BP 1 Location: Left arm, BP Patient Position: Sitting)    Pulse 76    Temp 97.9 °F (36.6 °C) (Oral)    Resp 18    Ht 5' 5\" (1.651 m)    Wt 216 lb 9.6 oz (98.2 kg)    LMP 07/25/2000    SpO2 96%    BMI 36.04 kg/m2    Body mass index is 36.04 kg/(m^2). Assessment of cognitive impairment: Alert and oriented x 3    Depression Screen:   PHQ 2 / 9, over the last two weeks 5/2/2017   Little interest or pleasure in doing things Not at all   Feeling down, depressed or hopeless Not at all   Total Score PHQ 2 0       Fall Risk Assessment:    Fall Risk Assessment, last 12 mths 5/2/2017   Able to walk? Yes   Fall in past 12 months? No       Functional Ability:   Does the patient exhibit a steady gait? yes   How long did it take the patient to get up and walk from a sitting position? 1-2 sec   Is the patient self reliant?  (ie can do own laundry, meals, household chores)  yes     Does the patient handle his/her own medications? yes     Does the patient handle his/her own money? yes     Is the patients home safe (ie good lighting, handrails on stairs and bath, etc.)? yes     Did you notice or did patient express any hearing difficulties? no     Did you notice or did patient express any vision difficulties?   no     Were distance and reading eye charts used? no       Advance Care Planning:   Patient was offered the opportunity to discuss advance care planning:  yes     Does patient have an Advance Directive:  no   If no, did you provide information on Caring Connections?   yes       Plan:      Orders Placed This Encounter    REFERRAL FOR COLONOSCOPY    pneumococcal 13 annie conj dip (PREVNAR-13) 0.5 mL syrg injection       Health Maintenance   Topic Date Due    EYE EXAM RETINAL OR DILATED Q1  05/05/2017    INFLUENZA AGE 9 TO ADULT  08/01/2017    HEMOGLOBIN A1C Q6M  08/03/2017    FOOT EXAM Q1  02/03/2018    MICROALBUMIN Q1  02/03/2018    LIPID PANEL Q1  02/03/2018    MEDICARE YEARLY EXAM  05/03/2018    GLAUCOMA SCREENING Q2Y  05/05/2018    COLONOSCOPY  06/04/2022    DTaP/Tdap/Td series (2 - Td) 02/03/2027    OSTEOPOROSIS SCREENING (DEXA)  Completed    ZOSTER VACCINE AGE 60>  Completed    Pneumococcal 65+ Low/Medium Risk  Completed       *Patient verbalized understanding and agreement with the plan. A copy of the After Visit Summary with personalized health plan was given to the patient today.

## 2017-05-02 NOTE — MR AVS SNAPSHOT
Visit Information Date & Time Provider Department Dept. Phone Encounter #  
 5/2/2017  8:00 AM Greg Francois, [address] [phone] [id number] Upcoming Health Maintenance Date Due  
 EYE EXAM RETINAL OR DILATED Q1 5/5/2017 INFLUENZA AGE 9 TO ADULT 8/1/2017 HEMOGLOBIN A1C Q6M 8/3/2017 FOOT EXAM Q1 2/3/2018 MICROALBUMIN Q1 2/3/2018 LIPID PANEL Q1 2/3/2018 MEDICARE YEARLY EXAM 5/3/2018 GLAUCOMA SCREENING Q2Y 5/5/2018 COLONOSCOPY 6/4/2022 DTaP/Tdap/Td series (2 - Td) 2/3/2027 Allergies as of 5/2/2017  Review Complete On: 5/2/2017 By: Greg Francois MD  
  
 Severity Noted Reaction Type Reactions Ace Inhibitors  02/28/2013    Other (comments) Renal insufficiency Hydrochlorothiazide  08/05/2015    Other (comments) Stopped d/t renal insufficiency, followed by Nephrologist  
  
Current Immunizations  Reviewed on 5/2/2017 Name Date Influenza High Dose Vaccine PF 12/7/2015 Pneumococcal Vaccine (Unspecified Type) 1/1/2010 Zoster 10/12/2012 Reviewed by Greg Francois MD on 5/2/2017 at  8:25 AM  
You Were Diagnosed With   
  
 Codes Comments Routine general medical examination at a health care facility    -  Primary ICD-10-CM: Z00.00 ICD-9-CM: V70.0 ACP (advance care planning)     ICD-10-CM: Z71.89 ICD-9-CM: V65.49 Diabetes mellitus type 2, noninsulin dependent ([facility]     ICD-10-CM: E11.9 ICD-9-CM: 250.00 Hypercholesterolemia     ICD-10-CM: E78.00 ICD-9-CM: 272.0 Benign essential hypertension     ICD-10-CM: I10 
ICD-9-CM: 401.1 CKD (chronic kidney disease), stage III     ICD-10-CM: N18.3 ICD-9-CM: 366. 3 Need for vaccination with 13-polyvalent pneumococcal conjugate vaccine     ICD-10-CM: D09 ICD-9-CM: V03.82 Screening for colorectal cancer     ICD-10-CM: Z12.11, Z12.12 
ICD-9-CM: V76.51 Vitals BP Pulse Temp Resp Height(growth percentile) Weight(growth percentile) 118/78 (BP 1 Location: Left arm, BP Patient Position: Sitting) 76 97.9 °F (36.6 °C) (Oral) 18 5' 5\" (1.651 m) 216 lb 9.6 oz (98.2 kg) LMP SpO2 BMI OB Status Smoking Status 2000 96% 36.04 kg/m2 Hysterectomy Never Smoker BMI and BSA Data Body Mass Index Body Surface Area 36.04 kg/m 2 2.12 m 2 Preferred Pharmacy Pharmacy Name Phone Polina Wells ([address] [facility]) [facility] [phone] Your Updated Medication List  
  
   
This list is accurate as of: 17  8:34 AM.  Always use your most recent med list.  
  
  
  
  
 acetaminophen 500 mg tablet Commonly known as:  TYLENOL Take 500 mg by mouth daily as needed for Pain. amLODIPine 2.5 mg tablet Commonly known as:  Richelle Fast TAKE 1 TABLET EVERY DAY FOR HYPERTENSION  
  
 aspirin 81 mg tablet Take 81 mg by mouth daily. bumetanide 0.5 mg tablet Commonly known as:  Erwin Lopez Take 1 Tab by mouth daily as needed. Take with potassium rich food or beverage  Indications: EDEMA FISH OIL PO Take 1 Cap by mouth daily. metoprolol succinate 25 mg XL tablet Commonly known as:  TOPROL-XL  
TAKE 1 TABLET EVERY DAY FOR HYPERTENSION  
  
 pneumococcal 13 annei conj dip 0.5 mL Syrg injection Commonly known as:  PREVNAR-13  
0.5 mL by IntraMUSCular route once for 1 dose. PLEASE FAX VERIFICATION TO [id number] VITAMIN D3 1,000 unit tablet Generic drug:  cholecalciferol Take  by mouth daily. Prescriptions Printed Refills  
 pneumococcal 13 annie conj dip (PREVNAR-13) 0.5 mL syrg injection 0 Si.5 mL by IntraMUSCular route once for 1 dose. PLEASE FAX VERIFICATION TO [id number] Class: Print Route: IntraMUSCular We Performed the Following REFERRAL FOR COLONOSCOPY [[id number] Custom] Comments:  
 Patient scheduling 3 yr colonoscopy follow up.  States she needs referral  
  
 Referral Information Referral ID Referred By Referred To  
  
 [id number] [facility] [address] Visits Status Start Date End Date 1 New Request 5/2/17 5/2/18 If your referral has a status of pending review or denied, additional information will be sent to support the outcome of this decision. Patient Instructions Pneumococcal Conjugate Vaccine (PCV13): What You Need to Know Why get vaccinated? Vaccination can protect both children and adults from pneumococcal disease. Pneumococcal disease is caused by bacteria that can spread from person to person through close contact. It can cause ear infections, and it can also lead to more serious infections of the: 
· Lungs (pneumonia). · Blood (bacteremia). · Covering of the brain and spinal cord (meningitis). Pneumococcal pneumonia is most common among adults. Pneumococcal meningitis can cause deafness and brain damage, and it kills about 1 child in 10 who get it. Anyone can get pneumococcal disease, but children under 3years of age and adults 72 years and older, people with certain medical conditions, and cigarette smokers are at the highest risk. Before there was a vaccine, the [facility] saw the following in children under 5 each year from pneumococcal disease: · More than 700 cases of meningitis · About 13,000 blood infections · About 5 million ear infections · About 200 deaths Since the vaccine became available, severe pneumococcal disease in these children has fallen by 88%. About 18,000 older adults die of pneumococcal disease each year in the [place]. Treatment of pneumococcal infections with penicillin and other drugs is not as effective as it used to be, because some strains of the disease have become resistant to these drugs. This makes prevention of the disease through vaccination even more important. PCV13 vaccine Pneumococcal conjugate vaccine (called PCV13) protects against 13 types of pneumococcal bacteria. PCV13 is routinely given to children at 2, 4, 6, and 1515 months of age. It is also recommended for children and adults 3to 59years of age with certain health conditions, and for all adults 72years of age and older. Your doctor can give you details. Some people should not get this vaccine Anyone who has ever had a life-threatening allergic reaction to a dose of this vaccine, to an earlier pneumococcal vaccine called PCV7, or to any vaccine containing diphtheria toxoid (for example, DTaP), should not get PCV13. Anyone with a severe allergy to any component of PCV13 should not get the vaccine. Tell your doctor if the person being vaccinated has any severe allergies. If the person scheduled for vaccination is not feeling well, your healthcare provider might decide to reschedule the shot on another day. Risks of a vaccine reaction With any medicine, including vaccines, there is a chance of reactions. These are usually mild and go away on their own, but serious reactions are also possible. Problems reported following PCV13 varied by age and dose in the series. The most common problems reported among children were: · About half became drowsy after the shot, had a temporary loss of appetite, or had redness or tenderness where the shot was given. · About 1 out of 3 had swelling where the shot was given. · About 1 out of 3 had a mild fever, and about 1 in 20 had a fever over 102.2°F. 
· Up to about 8 out of 10 became fussy or irritable. Adults have reported pain, redness, and swelling where the shot was given; also mild fever, fatigue, headache, chills, or muscle pain. Nilson Prieto children who get PCV13 along with inactivated flu vaccine at the same time may be at increased risk for seizures caused by fever. Ask your doctor for more information. Problems that could happen after any vaccine: · People sometimes faint after a medical procedure, including vaccination. Sitting or lying down for about 15 minutes can help prevent fainting and the injuries caused by a fall. Tell your doctor if you feel dizzy or have vision changes or ringing in the ears. · Some older children and adults get severe pain in the shoulder and have difficulty moving the arm where a shot was given. This happens very rarely. · Any medication can cause a severe allergic reaction. Such reactions from a vaccine are very rare, estimated at about 1 in a million doses, and would happen within a few minutes to a few hours after the vaccination. As with any medicine, there is a very small chance of a vaccine causing a serious injury or death. The safety of vaccines is always being monitored. For more information, visit: www.cdc.gov/vaccinesafety. What if there is a serious reaction? What should I look for? · Look for anything that concerns you, such as signs of a severe allergic reaction, very high fever, or unusual behavior. Signs of a severe allergic reaction can include hives, swelling of the face and throat, difficulty breathing, a fast heartbeat, dizziness, and weakness, usually within a few minutes to a few hours after the vaccination. What should I do? · If you think it is a severe allergic reaction or other emergency that can't wait, call 911 or get the person to the nearest hospital. Otherwise, call your doctor. · Reactions should be reported to the Vaccine Adverse Event Reporting System (VAERS). Your doctor should file this report, or you can do it yourself through the VAERS website at www.vaers. hhs.gov, or by calling 3-997.396.2197. VAERS does not give medical advice. The National Vaccine Injury Compensation Program 
The National Vaccine Injury Compensation Program (VICP) is a federal program that was created to compensate people who may have been injured by certain vaccines. Persons who believe they may have been injured by a vaccine can learn about the program and about filing a claim by calling 1-598.449.6181 or visiting the [employer] website at www.[facility].gov/vaccinecompensation. There is a time limit to file a claim for compensation. How can I learn more? · Ask your healthcare provider. He or she can give you the vaccine package insert or suggest other sources of information. · Call your local or state health department. · Contact the Centers for Disease Control and Prevention (CDC): 
¨ Call 7-969.498.6731 (1-800-CDC-INFO) or ¨ Visit CDC's website at www.cdc.gov/vaccines Vaccine Information Statement PCV13 Vaccine 11/5/2015 
42 DANNY Henry [id number] [facility] Information Centers for Disease Control and Prevention Many Vaccine Information Statements are available in Serbian and other languages. See www.immunize.org/vis. Muchas hojas de información sobre vacunas están disponibles en español y en otros idiomas. Visite www.immunize.org/vis. Care instructions adapted under license by your healthcare professional. If you have questions about a medical condition or this instruction, always ask your healthcare professional. [place] [address] any warranty or liability for your use of this information. Schedule of Personalized Health Plan (Provide Copy to Patient) The best way to stay healthy is to live a healthy lifestyle. A healthy lifestyle includes regular exercise, eating a well-balanced diet, keeping a healthy weight and not smoking. Regular physical exams and screening tests are another important way to take care of yourself. Preventive exams provided by health care providers can find health problems early when treatment works best and can keep you from getting certain diseases or illnesses. Preventive services include exams, lab tests, screenings, shots, monitoring and information to help you take care of your own health. All people over 65 should have a pneumonia shot. Pneumonia shots are usually only needed once in a lifetime unless your doctor decides differently. All people over 65 should have a yearly flu shot. People over 65 are at medium to high risk for Hepatitis B. Three shots are needed for complete protection. In addition to your physical exam, some screening tests are recommended: 
 
Bone mass measurement (dexa scan) is recommended every two years Diabetes Mellitus screening is recommended every year. Glaucoma is an eye disease caused by high pressure in the eye. An eye exam is recommended every year. Cardiovascular screening tests that check your cholesterol and other blood fat (lipid) levels are recommended every five years. Colorectal Cancer screening tests help to find pre-cancerous polyps (growths in the colon) so they can be removed before they turn into cancer. Tests ordered for screening depend on your personal and family history risk factors. Screening for Breast Cancer is recommended yearly with a mammogram. 
 
Screening for Cervical Cancer is recommended every two years (annually for certain risk factors, such as previous history of STD or abnormal PAP in past 7 years), with a Pelvic Exam with PAP Here is a list of your current Health Maintenance items with a due date: 
Health Maintenance Topic Date Due  
 EYE EXAM RETINAL OR DILATED Q1  05/05/2017  INFLUENZA AGE 9 TO ADULT  08/01/2017  
 HEMOGLOBIN A1C Q6M  08/03/2017  
 FOOT EXAM Q1  02/03/2018  MICROALBUMIN Q1  02/03/2018  LIPID PANEL Q1  02/03/2018  MEDICARE YEARLY EXAM  05/03/2018  GLAUCOMA SCREENING Q2Y  05/05/2018  COLONOSCOPY  06/04/2022  DTaP/Tdap/Td series (2 - Td) 02/03/2027  
 OSTEOPOROSIS SCREENING (DEXA)  Completed  ZOSTER VACCINE AGE 60>  Completed  Pneumococcal 65+ Low/Medium Risk  Completed [facility]! [facility] introduces [employer] patient portal. Now you can access parts of your medical record, email your doctor's office, and request medication refills online. 1. In your internet browser, go to [email or web address] 2. Click on the First Time User? Click Here link in the Sign In box. You will see the New Member Sign Up page. 3. Enter your [employer] Access Code exactly as it appears below. You will not need to use this code after youve completed the sign-up process. If you do not sign up before the expiration date, you must request a new code. · [employer] Access Code: [email or web address] Expires: 5/4/2017 11:35 AM 
 
4. Enter the last four digits of your Social Security Number (xxxx) and Date of Birth (mm/dd/yyyy) as indicated and click Submit. You will be taken to the next sign-up page. 5. Create a [employer] ID. This will be your [employer] login ID and cannot be changed, so think of one that is secure and easy to remember. 6. Create a [employer] password. You can change your password at any time. 7. Enter your Password Reset Question and Answer. This can be used at a later time if you forget your password. 8. Enter your e-mail address. You will receive e-mail notification when new information is available in [address] 9. Click Sign Up. You can now view and download portions of your medical record. 10. Click the Download Summary menu link to download a portable copy of your medical information. If you have questions, please visit the Frequently Asked Questions section of the [employer] website. Remember, [employer] is NOT to be used for urgent needs. For medical emergencies, dial 911. Now available from your iPhone and Android! Please provide this summary of care documentation to your next provider. Your primary care clinician is listed as MYLENE MARS. If you have any questions after today's visit, please call [phone].

## 2017-05-03 LAB
BUN SERPL-MCNC: 18 MG/DL (ref 8–27)
BUN/CREAT SERPL: 16 (ref 12–28)
CALCIUM SERPL-MCNC: 9.8 MG/DL (ref 8.7–10.3)
CHLORIDE SERPL-SCNC: 103 MMOL/L (ref 96–106)
CHOLEST SERPL-MCNC: 169 MG/DL (ref 100–199)
CO2 SERPL-SCNC: 25 MMOL/L (ref 18–29)
CREAT SERPL-MCNC: 1.1 MG/DL (ref 0.57–1)
EST. AVERAGE GLUCOSE BLD GHB EST-MCNC: 134 MG/DL
GLUCOSE SERPL-MCNC: 107 MG/DL (ref 65–99)
HBA1C MFR BLD: 6.3 % (ref 4.8–5.6)
HDLC SERPL-MCNC: 57 MG/DL
INTERPRETATION, 910389: NORMAL
INTERPRETATION: NORMAL
LDLC SERPL CALC-MCNC: 94 MG/DL (ref 0–99)
PDF IMAGE, 910387: NORMAL
POTASSIUM SERPL-SCNC: 4.7 MMOL/L (ref 3.5–5.2)
SODIUM SERPL-SCNC: 144 MMOL/L (ref 134–144)
TRIGL SERPL-MCNC: 88 MG/DL (ref 0–149)
VLDLC SERPL CALC-MCNC: 18 MG/DL (ref 5–40)

## 2017-05-08 NOTE — PROGRESS NOTES
Lipids excellent  Fasting BS and HgBa1c are improving nicely  Keep up the good work, it is paying off, making progress  No change in meds  Follow up 6months

## 2017-05-08 NOTE — PROGRESS NOTES
Advised patient of results and recommendations, patient understood and agreed to understanding. Patient scheduled for an office visit 11/08/2017 at 10 am, patient understood and agreed to appointment date and time.

## 2017-05-17 ENCOUNTER — TELEPHONE (OUTPATIENT)
Dept: FAMILY MEDICINE CLINIC | Age: 72
End: 2017-05-17

## 2017-05-17 NOTE — TELEPHONE ENCOUNTER
This is from [address] [place] [address] Dr. They don't have a Finas Riding on staff. Spoke to someone else and she states she needed clarification of the referral request.  I forwarded call to Erik abdalla.

## 2017-05-17 NOTE — TELEPHONE ENCOUNTER
----- Message from Corrina Theodore sent at 5/16/2017  4:18 PM EDT -----  Regarding: Alesia Holm with [facility], need clarification on referral for Colon Screening, being done by  Dr. Femi Mcgrath. Will this be done in the doctors office? Please call by end of day today. Best contact number [phone].

## 2017-05-22 ENCOUNTER — TELEPHONE (OUTPATIENT)
Dept: FAMILY MEDICINE CLINIC | Age: 72
End: 2017-05-22

## 2017-05-22 NOTE — TELEPHONE ENCOUNTER
----- Message from Janelle Pandya sent at 5/22/2017  2:38 PM EDT -----  Regarding: / Telephone  Pt is requesting a callback in regards to getting the wrong referral sent it was suppose to be a referral to get a colonoscopy not the dermatologist . [facility] contact [phone].

## 2017-05-23 NOTE — TELEPHONE ENCOUNTER
There was no referral processed for dermatology. Referral for colonoscopy was processed and it was approved by [employer] on 05/17/17. Message left for patient.

## 2017-06-07 DIAGNOSIS — I10 BENIGN ESSENTIAL HYPERTENSION: ICD-10-CM

## 2017-06-07 NOTE — TELEPHONE ENCOUNTER
Received faxed refill request for this medication from the pharmacy that is on file.     amLODIPine (NORVASC) 2.5 mg tablet  Normal, Disp-90 Tab, R-3

## 2017-06-10 RX ORDER — AMLODIPINE BESYLATE 2.5 MG/1
TABLET ORAL
Qty: 90 TAB | Refills: 3 | Status: SHIPPED | OUTPATIENT
Start: 2017-06-10 | End: 2018-03-29 | Stop reason: SDUPTHER

## 2017-06-23 LAB — COLONOSCOPY, EXTERNAL: NORMAL

## 2017-07-03 ENCOUNTER — TELEPHONE (OUTPATIENT)
Dept: FAMILY MEDICINE CLINIC | Age: 72
End: 2017-07-03

## 2017-07-03 NOTE — TELEPHONE ENCOUNTER
Referral Request Telephone Call      Insurance Name:     Salbador Gibbs [address] ID:  [id number]   Specialist Name: Justine Lee   Type of Specialty:  Optometry    Address of Specialist:  [place] [address]   Phone/Fax Number of Specialist: [phone] Fax  [phone]   Diagnosis: Eye Exam   Appointment Date: 7-6-2017 @ 2:15   NPI    Tax ID

## 2017-07-03 NOTE — TELEPHONE ENCOUNTER
Per Leonor, referral was \"denied - carve out vision\". Patient notified and she will call insurance company.

## 2017-07-28 DIAGNOSIS — R60.9 DEPENDENT EDEMA: ICD-10-CM

## 2017-07-28 DIAGNOSIS — I10 BENIGN ESSENTIAL HYPERTENSION: ICD-10-CM

## 2017-07-28 DIAGNOSIS — I87.2 VENOUS INSUFFICIENCY OF BOTH LOWER EXTREMITIES: ICD-10-CM

## 2017-07-31 RX ORDER — BUMETANIDE 0.5 MG/1
0.5 TABLET ORAL
Qty: 90 TAB | Refills: 1 | Status: SHIPPED | OUTPATIENT
Start: 2017-07-31 | End: 2018-04-03 | Stop reason: SDUPTHER

## 2017-07-31 RX ORDER — METOPROLOL SUCCINATE 25 MG/1
TABLET, EXTENDED RELEASE ORAL
Qty: 90 TAB | Refills: 1 | Status: SHIPPED | OUTPATIENT
Start: 2017-07-31 | End: 2018-01-31 | Stop reason: SDUPTHER

## 2017-07-31 NOTE — TELEPHONE ENCOUNTER
Called pt to let her know that Dr Diane Pratt did a years supply on the metoprolol to [facility]. Pt states that she doesn't have [employer] any more and needs rx to go to [facility] on file. The bumex she takes prn not qd.     Pt scheduled 11/8 @ 10:00

## 2017-07-31 NOTE — TELEPHONE ENCOUNTER
[address] [facility] [address] [facility] is calling to check the status of her Metoprolol and bumetanide refills. She states that she has been out of her medications since Thursday and hopes that they will be called in today.

## 2017-11-08 ENCOUNTER — OFFICE VISIT (OUTPATIENT)
Dept: FAMILY MEDICINE CLINIC | Age: 72
End: 2017-11-08

## 2017-11-08 VITALS
HEIGHT: 65 IN | HEART RATE: 80 BPM | DIASTOLIC BLOOD PRESSURE: 78 MMHG | OXYGEN SATURATION: 99 % | RESPIRATION RATE: 18 BRPM | TEMPERATURE: 97.9 F | SYSTOLIC BLOOD PRESSURE: 124 MMHG | BODY MASS INDEX: 36.82 KG/M2 | WEIGHT: 221 LBS

## 2017-11-08 DIAGNOSIS — E11.9 DIABETES MELLITUS TYPE 2, NONINSULIN DEPENDENT (HCC): Primary | ICD-10-CM

## 2017-11-08 DIAGNOSIS — R60.9 DEPENDENT EDEMA: ICD-10-CM

## 2017-11-08 DIAGNOSIS — N18.30 CKD (CHRONIC KIDNEY DISEASE), STAGE III (HCC): ICD-10-CM

## 2017-11-08 DIAGNOSIS — I10 BENIGN ESSENTIAL HYPERTENSION: ICD-10-CM

## 2017-11-08 DIAGNOSIS — E78.00 HYPERCHOLESTEROLEMIA: ICD-10-CM

## 2017-11-08 NOTE — MR AVS SNAPSHOT
Visit Information Date & Time Provider Department Dept. Phone Encounter #  
 11/8/2017 10:00 AM [facility] [phone] [id number] Upcoming Health Maintenance Date Due  
 EYE EXAM RETINAL OR DILATED Q1 5/5/2017 FOOT EXAM Q1 2/3/2018 MICROALBUMIN Q1 2/3/2018 LIPID PANEL Q1 5/2/2018 MEDICARE YEARLY EXAM 5/3/2018 GLAUCOMA SCREENING Q2Y 5/5/2018 HEMOGLOBIN A1C Q6M 5/8/2018 DTaP/Tdap/Td series (2 - Td) 2/3/2027 COLONOSCOPY 6/23/2027 Allergies as of 11/8/2017  Review Complete On: 11/8/2017 By: Grace Adorno MD  
  
 Severity Noted Reaction Type Reactions Ace Inhibitors  02/28/2013    Other (comments) Renal insufficiency Hydrochlorothiazide  08/05/2015    Other (comments) Stopped d/t renal insufficiency, followed by Nephrologist  
  
Current Immunizations  Reviewed on 11/8/2017 Name Date Influenza High Dose Vaccine PF 10/2/2017, 12/7/2015 ZZZ-RETIRED (DO NOT USE) Pneumococcal Vaccine (Unspecified Type) 1/1/2010 Zoster 10/12/2012 Reviewed by Grace Adorno MD on 11/8/2017 at 11:00 AM  
You Were Diagnosed With   
  
 Codes Comments Diabetes mellitus type 2, noninsulin dependent [facility]    -  Primary ICD-10-CM: E11.9 ICD-9-CM: 250.00 Benign essential hypertension     ICD-10-CM: I10 
ICD-9-CM: 401.1 Hypercholesterolemia     ICD-10-CM: E78.00 ICD-9-CM: 272.0 CKD (chronic kidney disease), stage III     ICD-10-CM: N18.3 ICD-9-CM: 320. 3 Vitals BP Pulse Temp Resp Height(growth percentile) Weight(growth percentile) 124/78 (BP 1 Location: Left arm, BP Patient Position: Sitting) 80 97.9 °F (36.6 °C) (Oral) 18 5' 5\" (1.651 m) 221 lb (100.2 kg) LMP SpO2 BMI OB Status Smoking Status 07/25/2000 99% 36.78 kg/m2 Hysterectomy Never Smoker Vitals History BMI and BSA Data Body Mass Index Body Surface Area  36.78 kg/m 2 2.14 m 2  
  
  
 Preferred Pharmacy Pharmacy Name Phone Jason Benavidez [address] [facility], [address] [facility], [address] [phone] Your Updated Medication List  
  
   
This list is accurate as of: 11/8/17 11:04 AM.  Always use your most recent med list.  
  
  
  
  
 acetaminophen 500 mg tablet Commonly known as:  TYLENOL Take 500 mg by mouth daily as needed for Pain. amLODIPine 2.5 mg tablet Commonly known as:  Voncile Eugene TAKE 1 TABLET EVERY DAY FOR HYPERTENSION  
  
 aspirin 81 mg tablet Take 81 mg by mouth daily. bumetanide 0.5 mg tablet Commonly known as:  Singletary Tiffani Take 1 Tab by mouth daily as needed. Take with potassium rich food or beverage  Indications: Edema FISH OIL PO Take 1 Cap by mouth daily. metoprolol succinate 25 mg XL tablet Commonly known as:  TOPROL-XL  
TAKE 1 TABLET EVERY DAY FOR HYPERTENSION  
  
 VITAMIN D3 1,000 unit tablet Generic drug:  cholecalciferol Take  by mouth daily. Patient Instructions Learning About Meal Planning for Diabetes Why plan your meals? Meal planning can be a key part of managing diabetes. Planning meals and snacks with the right balance of carbohydrate, protein, and fat can help you keep your blood sugar at the target level you set with your doctor. You don't have to eat special foods. You can eat what your family eats, including sweets once in a while. But you do have to pay attention to how often you eat and how much you eat of certain foods. You may want to work with a dietitian or a certified diabetes educator. He or she can give you tips and meal ideas and can answer your questions about meal planning. This health professional can also help you reach a healthy weight if that is one of your goals. What plan is right for you? Your dietitian or diabetes educator may suggest that you start with the plate format or carbohydrate counting. The plate format The plate format is a simple way to help you manage how you eat. You plan meals by learning how much space each food should take on a plate. Using the plate format helps you spread carbohydrate throughout the day. It can make it easier to keep your blood sugar level within your target range. It also helps you see if you're eating healthy portion sizes. To use the plate format, you put non-starchy vegetables on half your plate. Add meat or meat substitutes on one-quarter of the plate. Put a grain or starchy vegetable (such as brown rice or a potato) on the final quarter of the plate. You can add a small piece of fruit and some low-fat or fat-free milk or yogurt, depending on your carbohydrate goal for each meal. 
Here are some tips for using the plate format: · Make sure that you are not using an oversized plate. A 9-inch plate is best. Many restaurants use larger plates. · Get used to using the plate format at home. Then you can use it when you eat out. · Write down your questions about using the plate format. Talk to your doctor, a dietitian, or a diabetes educator about your concerns. Carbohydrate counting With carbohydrate counting, you plan meals based on the amount of carbohydrate in each food. Carbohydrate raises blood sugar higher and more quickly than any other nutrient. It is found in desserts, breads and cereals, and fruit. It's also found in starchy vegetables such as potatoes and corn, grains such as rice and pasta, and milk and yogurt. Spreading carbohydrate throughout the day helps keep your blood sugar levels within your target range. Your daily amount depends on several things, including your weight, how active you are, which diabetes medicines you take, and what your goals are for your blood sugar levels. A registered dietitian or diabetes educator can help you plan how much carbohydrate to include in each meal and snack. A guideline for your daily amount of carbohydrate is: · 45 to 60 grams at each meal. That's about the same as 3 to 4 carbohydrate servings. · 15 to 20 grams at each snack. That's about the same as 1 carbohydrate serving. The Nutrition Facts label on packaged foods tells you how much carbohydrate is in a serving of the food. First, look at the serving size on the food label. Is that the amount you eat in a serving? All of the nutrition information on a food label is based on that serving size. So if you eat more or less than that, you'll need to adjust the other numbers. Total carbohydrate is the next thing you need to look for on the label. If you count carbohydrate servings, one serving of carbohydrate is 15 grams. For foods that don't come with labels, such as fresh fruits and vegetables, you'll need a guide that lists carbohydrate in these foods. Ask your doctor, dietitian, or diabetes educator about books or other nutrition guides you can use. If you take insulin, you need to know how many grams of carbohydrate are in a meal. This lets you know how much rapid-acting insulin to take before you eat. If you use an insulin pump, you get a constant rate of insulin during the day. So the pump must be programmed at meals to give you extra insulin to cover the rise in blood sugar after meals. When you know how much carbohydrate you will eat, you can take the right amount of insulin. Or, if you always use the same amount of insulin, you need to make sure that you eat the same amount of carbohydrate at meals. If you need more help to understand carbohydrate counting and food labels, ask your doctor, dietitian, or diabetes educator. How do you get started with meal planning? Here are some tips to get started: 
· Plan your meals a week at a time. Don't forget to include snacks too. · Use cookbooks or online recipes to plan several main meals. Plan some quick meals for busy nights.  You also can double some recipes that freeze well. Then you can save half for other busy nights when you don't have time to cook. · Make sure you have the ingredients you need for your recipes. If you're running low on basic items, put these items on your shopping list too. · List foods that you use to make breakfasts, lunches, and snacks. List plenty of fruits and vegetables. · Post this list on the refrigerator. Add to it as you think of more things you need. · Take the list to the store to do your weekly shopping. Follow-up care is a key part of your treatment and safety. Be sure to make and go to all appointments, and call your doctor if you are having problems. It's also a good idea to know your test results and keep a list of the medicines you take. Where can you learn more? Go to http://yumi-raquel.info/. Harriet Yancey in the search box to learn more about \"Learning About Meal Planning for Diabetes. \" Current as of: March 13, 2017 Content Version: 11.4 © 0386-5577 [employer]. Care instructions adapted under license by [employer] (which disclaims liability or warranty for this information). If you have questions about a medical condition or this instruction, always ask your healthcare professional. [address] any warranty or liability for your use of this information. Introducing [facility]! Estefania Lund introduces [employer] patient portal. Now you can access parts of your medical record, email your doctor's office, and request medication refills online. 1. In your internet browser, go to [email or web address] 2. Click on the First Time User? Click Here link in the Sign In box. You will see the New Member Sign Up page. 3. Enter your [employer] Access Code exactly as it appears below. You will not need to use this code after youve completed the sign-up process. If you do not sign up before the expiration date, you must request a new code. · Whittl Access Code: [email or web address] Expires: 2/6/2018 10:05 AM 
 
4. Enter the last four digits of your Social Security Number (xxxx) and Date of Birth (mm/dd/yyyy) as indicated and click Submit. You will be taken to the next sign-up page. 5. Create a Whittl ID. This will be your Whittl login ID and cannot be changed, so think of one that is secure and easy to remember. 6. Create a Whittl password. You can change your password at any time. 7. Enter your Password Reset Question and Answer. This can be used at a later time if you forget your password. 8. Enter your e-mail address. You will receive e-mail notification when new information is available in [address] 9. Click Sign Up. You can now view and download portions of your medical record. 10. Click the Download Summary menu link to download a portable copy of your medical information. If you have questions, please visit the Frequently Asked Questions section of the Whittl website. Remember, Whittl is NOT to be used for urgent needs. For medical emergencies, dial 911. Now available from your iPhone and Android! Please provide this summary of care documentation to your next provider. Your primary care clinician is listed as MYLENE MARS. If you have any questions after today's visit, please call [phone].

## 2017-11-08 NOTE — PROGRESS NOTES
Chief Complaint   Patient presents with    Hypertension     6 month fasting     Diabetes     1. Have you been to the ER, urgent care clinic since your last visit? Hospitalized since your last visit? No    2. Have you seen or consulted any other health care providers outside of the [address] [place] [address] since your last visit? Include any pap smears or colon screening.    Yes [facility] fax for results of exam - done 5/2017

## 2017-11-08 NOTE — PATIENT INSTRUCTIONS

## 2017-11-08 NOTE — PROGRESS NOTES
HISTORY OF PRESENT ILLNESS   HPI  Fasting 6 month follow up diabetes, hypercholesterolemia, hypertension, CKD, labs and med check  Overall has been getting along well and feeling well in general.  No complaints or concerns at this time. Doing well on current medication regimen. Takes Bumex a few x a month prn ankle edema which works well. Takes Tylenol prn a few x a week at most as needed for arthritis pains and that works as well. She does not check BS's per personal preference and admits she is not exercising or following diet carefully. Wt is up 5 lbs. REVIEW OF SYMPTOMS     Review of Systems   Constitutional: Negative. Respiratory: Negative. Cardiovascular: Negative. Gastrointestinal: Negative. Genitourinary: Negative. Neurological: Negative. Endo/Heme/Allergies: Negative.            PROBLEM LIST/MEDICAL HISTORY      Problem List  Date Reviewed: 11/8/2017          Codes Class Noted    ACP (advance care planning) ICD-10-CM: Z71.89  ICD-9-CM: V65.49  5/2/2017    Overview Signed 5/2/2017  8:33 AM by Samuel Mcclellan MD     Initial ACP discussion [phone]. AMD form reviewed and copy provided.  NN/facilitator to discuss with patient               Diabetes mellitus type 2, noninsulin dependent ([facility] ICD-10-CM: E11.9  ICD-9-CM: 250.00  8/15/2016    Overview Signed 8/15/2016 10:36 AM by Samuel Mcclellan MD     5/2012, HgbA1c 6.5             Benign essential hypertension ICD-10-CM: I10  ICD-9-CM: 401.1  5/16/2016    Overview Signed 5/16/2016 11:03 AM by Samuel Mcclellan MD     DX 4/09             CKD (chronic kidney disease), stage III ICD-10-CM: N18.3  ICD-9-CM: 585.3  2/28/2013    Overview Addendum 2/9/2016 10:57 AM by Samuel Mcclellan MD     2012; Dr Stuart Lozada al; Renal US 2/2013             Vitamin D deficiency ICD-10-CM: E55.9  ICD-9-CM: 268.9  2/28/2013    Overview Signed 2/28/2013  9:25 AM by Samuel Mcclellan MD     1/2013; weekly rx vit D per Nephrology             Dyspepsia & Heartburn ICD-10-CM: R10.13  ICD-9-CM: 536.8  2012    Overview Signed 2012  9:00 AM by May Solorzano MD     , cardiac w/u negative; improved w/ Pepcid             Impaired fasting glucose ICD-10-CM: R73.01  ICD-9-CM: 790.21  2012    Overview Signed 2012  9:07 AM by May Solorzano MD                  Chest pain, unspecified ICD-10-CM: R07.9  ICD-9-CM: 786.50  2012    Overview Signed 2012  9:00 AM by May Solorzano MD     2012, Dr Higinio Lantigua; Cardiac cath negative, GI related             Hypercholesterolemia ICD-10-CM: E78.00  ICD-9-CM: 272.0  2011        Mild renal insufficiency ICD-10-CM: N28.9  ICD-9-CM: 593.9  2011    Overview Signed 2013  9:22 AM by May Solorzano MD                  Bilateral Dependent Foot & Ankle Edema, L>R ICD-10-CM: R60.9  ICD-9-CM: 782.3  2010         (normal spontaneous vaginal delivery) x3 ICD-10-CM: O80  ICD-9-CM: 650  Unknown    Overview Signed 5/3/2010 10:46 AM by May Solorzano MD      x4, 1 fetal demise after delivery at 11mos             Miscarriage x 2 ICD-10-CM: O03.9  ICD-9-CM: 634.90  Unknown        S/P D&C (status post dilation and curettage) ICD-10-CM: W12.671  ICD-9-CM: V45.89  Unknown    Overview Signed 2010 12:08 PM by May Solorzano MD     X1, SAB  x1, AUB             S/p laparoscopic cholecystectomy gallstones ICD-10-CM: Z90.49  ICD-9-CM: V45.89  Unknown    Overview Signed 5/3/2010 10:48 AM by May Solorzano MD                  S/p Rt bunionectomy ICD-10-CM: C32.523  ICD-9-CM: V45.89  Unknown        Lt Ankle edema, s/p surgery ICD-10-CM: M25.473  ICD-9-CM: 719.07  Unknown    Overview Signed 5/3/2010 10:49 AM by May Solorzano MD     Early 8511'U                       PAST SURGICAL HISTORY       Past Surgical History:   Procedure Laterality Date    CARDIAC CATHETERIZATION  2012    judd, Dr Higinio Lantigua, but given NTG just in case to use prn    COLONOSCOPY  ~2000    normal, f/u 10 yrs; Dr Sony Conrad  06/04/2012    Dr Julian Nickerson, ok per pt except diverticulosis, small benign polyp; repeat 5 yrs    HX BUNIONECTOMY Right     right    HX BUNIONECTOMY Left 12/15/2015    Dr. Flores Payer HX COLONOSCOPY N/A 06/23/2017    Dr Julian Nickerson - follow up in 10 years    HX DILATION AND CURETTAGE  x2    SAB x 1, AUB x1    HX KNEE REPLACEMENT  6/2010    Right TKR; Dr Aden Amaro  ~2000    AUB, benign disease    LAP,CHOLECYSTECTOMY  1999    gallstones    LEG/ANKLE SURGERY PROC UNLISTED  1990's    left ankle surgery    JARRELL MAMMOGRAM SCREEN BILAT  annually    [facility]         MEDICATIONS      Current Outpatient Prescriptions   Medication Sig    metoprolol succinate (TOPROL-XL) 25 mg XL tablet TAKE 1 TABLET EVERY DAY FOR HYPERTENSION    bumetanide (BUMEX) 0.5 mg tablet Take 1 Tab by mouth daily as needed. Take with potassium rich food or beverage  Indications: Edema    amLODIPine (NORVASC) 2.5 mg tablet TAKE 1 TABLET EVERY DAY FOR HYPERTENSION    acetaminophen (TYLENOL) 500 mg tablet Take 500 mg by mouth daily as needed for Pain.  cholecalciferol (VITAMIN D3) 1,000 unit tablet Take  by mouth daily.  DOCOSAHEXANOIC ACID/EPA (FISH OIL PO) Take 1 Cap by mouth daily.  aspirin 81 mg tablet Take 81 mg by mouth daily. No current facility-administered medications for this visit.            ALLERGIES     Allergies   Allergen Reactions    Ace Inhibitors Other (comments)     Renal insufficiency    Hydrochlorothiazide Other (comments)     Stopped d/t renal insufficiency, followed by Nephrologist          SOCIAL HISTORY       Social History     Social History    Marital status:      Spouse name: N/A    Number of children: 3    Years of education: N/A     Occupational History    Retired [employer] Smoking status: Never Smoker    Smokeless tobacco: Never Used    Alcohol use Yes      Comment: rarely    Drug use: No    Sexual activity: Yes     Partners: Male     Other Topics Concern     Service No    Blood Transfusions No    Caffeine Concern No     1 bottle of 16 oz of Pepsi a day; this is down considerably for her    Occupational Exposure No    Hobby Hazards No    Sleep Concern No    Stress Concern No    Special Diet No    Back Care No    Exercise No    Bike Helmet Yes    Seat Belt Yes    Self-Exams Yes     Social History Narrative    Lives at home w/ her , son and grand daughter (baby), 2 story home        IMMUNIZATIONS  Immunization History   Administered Date(s) Administered    Influenza High Dose Vaccine PF 2015, 10/02/2017    ZZZ-RETIRED (DO NOT USE) Pneumococcal Vaccine (Unspecified Type) 2010    Zoster 10/12/2012         FAMILY HISTORY     Family History   Problem Relation Age of Onset    Hypertension Mother     Arthritis-osteo Mother      B TKR    Arthritis-rheumatoid Mother      juvenile    Breast Cancer Mother 80    Other Mother      Diverticulitis    Hypertension Father    [facility] Diabetes Father       age 80 kidney failure; dialysis pt    Diabetes Sister     Cancer Brother       age 46 \"bone cancer\"    Alcohol abuse Brother       of cirrhosis age 58    Heart Disease Maternal Grandmother       at 80    Heart Disease Paternal Grandmother       at 80    Heart Attack Paternal Grandfather       in his 66's   [facility] Anesth Problems Neg Hx          VITALS     Visit Vitals    /78 (BP 1 Location: Left arm, BP Patient Position: Sitting)    Pulse 80    Temp 97.9 °F (36.6 °C) (Oral)    Resp 18    Ht 5' 5\" (1.651 m)    Wt 221 lb (100.2 kg)    LMP 2000    SpO2 99%    BMI 36.78 kg/m2          PHYSICAL EXAMINATION     Physical Exam   Constitutional: No distress. Neck: No JVD present. Carotid bruit is not present. Cardiovascular: Normal rate, regular rhythm and normal heart sounds. No murmur heard. Pulmonary/Chest: Effort normal and breath sounds normal.   Abdominal: Soft. There is no tenderness. Musculoskeletal: She exhibits no tenderness. Mild non pitting edema B ankles   Vitals reviewed.              ASSESSMENT & PLAN       ICD-10-CM ICD-9-CM    1. Diabetes mellitus type 2, noninsulin dependent (HCC) E11.9 250.00 HM DIABETES EYE EXAM      HEMOGLOBIN A1C WITH EAG      METABOLIC PANEL, BASIC   2. Benign essential hypertension, controlled, stable, at diabetic goal I10 401.1    3. Hypercholesterolemia E78.00 272.0 ALT      AST      LIPID PANEL   4. CKD (chronic kidney disease), stage III S64.8 830.9 METABOLIC PANEL, BASIC   5. Bilateral Dependent Foot & Ankle Edema, L>R R60.9 782.3      Fasting labs today  Reviewed diet, exercise and weight control  Cardiovascular risk and specific lipid/LDL goals reviewed  Reviewed medications and side effects, doing well on current regimen  Specific diabetic recommendations: diabetic diet discussed in detail, written exchange diet given, annual eye examinations at Ophthalmology discussed and long term diabetic complications discussed. Patient saw her eye doctor for annual diabetic eye exam in ~ May. Report requested.    Further follow up & other recommendations pending review of labs as well  If all remains good and stable, rtc 6month follow up, sooner prn

## 2017-11-09 LAB
ALT SERPL-CCNC: 15 IU/L (ref 0–32)
AST SERPL-CCNC: 19 IU/L (ref 0–40)
BUN SERPL-MCNC: 14 MG/DL (ref 8–27)
BUN/CREAT SERPL: 14 (ref 12–28)
CALCIUM SERPL-MCNC: 10 MG/DL (ref 8.7–10.3)
CHLORIDE SERPL-SCNC: 97 MMOL/L (ref 96–106)
CHOLEST SERPL-MCNC: 209 MG/DL (ref 100–199)
CO2 SERPL-SCNC: 29 MMOL/L (ref 18–29)
CREAT SERPL-MCNC: 1.03 MG/DL (ref 0.57–1)
EST. AVERAGE GLUCOSE BLD GHB EST-MCNC: 126 MG/DL
GFR SERPLBLD CREATININE-BSD FMLA CKD-EPI: 54 ML/MIN/1.73
GFR SERPLBLD CREATININE-BSD FMLA CKD-EPI: 62 ML/MIN/1.73
GLUCOSE SERPL-MCNC: 104 MG/DL (ref 65–99)
HBA1C MFR BLD: 6 % (ref 4.8–5.6)
HDLC SERPL-MCNC: 64 MG/DL
INTERPRETATION, 910389: NORMAL
INTERPRETATION: NORMAL
LDLC SERPL CALC-MCNC: 125 MG/DL (ref 0–99)
PDF IMAGE, 910387: NORMAL
POTASSIUM SERPL-SCNC: 5.1 MMOL/L (ref 3.5–5.2)
SODIUM SERPL-SCNC: 139 MMOL/L (ref 134–144)
TRIGL SERPL-MCNC: 100 MG/DL (ref 0–149)
VLDLC SERPL CALC-MCNC: 20 MG/DL (ref 5–40)

## 2017-11-26 ENCOUNTER — TELEPHONE (OUTPATIENT)
Dept: FAMILY MEDICINE CLINIC | Age: 72
End: 2017-11-26

## 2017-11-27 NOTE — TELEPHONE ENCOUNTER
Liver, kidney, electrolytes good and stable  HgbA1c is good and best it has been last several checks the past few years, great! LDL cholesterol went up to 125, goal <100, but HDL is very good, at goal and improved from last few checks at well. Cont same meds  Follow up 6months.

## 2018-03-27 LAB — MAMMOGRAPHY, EXTERNAL: NORMAL

## 2018-03-29 DIAGNOSIS — I10 BENIGN ESSENTIAL HYPERTENSION: ICD-10-CM

## 2018-03-29 RX ORDER — AMLODIPINE BESYLATE 2.5 MG/1
TABLET ORAL
Qty: 90 TAB | Refills: 0 | Status: SHIPPED | OUTPATIENT
Start: 2018-03-29 | End: 2018-06-25 | Stop reason: SDUPTHER

## 2018-03-29 RX ORDER — METOPROLOL SUCCINATE 25 MG/1
TABLET, EXTENDED RELEASE ORAL
Qty: 90 TAB | Refills: 0 | Status: SHIPPED | OUTPATIENT
Start: 2018-03-29 | End: 2018-06-25 | Stop reason: SDUPTHER

## 2018-03-29 NOTE — TELEPHONE ENCOUNTER
Pharmacy on file verified. refill  Requested Prescriptions     Pending Prescriptions Disp Refills    amLODIPine (NORVASC) 2.5 mg tablet 90 Tab 3     Sig: TAKE 1 TABLET EVERY DAY FOR HYPERTENSION    metoprolol succinate (TOPROL-XL) 25 mg XL tablet 90 Tab 3     Patient is changing pharmacies

## 2018-04-03 DIAGNOSIS — I87.2 VENOUS INSUFFICIENCY OF BOTH LOWER EXTREMITIES: ICD-10-CM

## 2018-04-03 DIAGNOSIS — R60.9 DEPENDENT EDEMA: ICD-10-CM

## 2018-04-04 RX ORDER — BUMETANIDE 0.5 MG/1
0.5 TABLET ORAL
Qty: 90 TAB | Refills: 1 | Status: SHIPPED | OUTPATIENT
Start: 2018-04-04 | End: 2018-09-18 | Stop reason: SDUPTHER

## 2018-05-04 ENCOUNTER — OFFICE VISIT (OUTPATIENT)
Dept: FAMILY MEDICINE CLINIC | Age: 73
End: 2018-05-04

## 2018-05-04 VITALS
SYSTOLIC BLOOD PRESSURE: 138 MMHG | TEMPERATURE: 97.8 F | HEART RATE: 72 BPM | DIASTOLIC BLOOD PRESSURE: 84 MMHG | OXYGEN SATURATION: 93 % | BODY MASS INDEX: 37.75 KG/M2 | HEIGHT: 65 IN | RESPIRATION RATE: 18 BRPM | WEIGHT: 226.6 LBS

## 2018-05-04 DIAGNOSIS — E78.00 HYPERCHOLESTEROLEMIA: ICD-10-CM

## 2018-05-04 DIAGNOSIS — E66.01 SEVERE OBESITY (BMI 35.0-39.9) WITH COMORBIDITY (HCC): ICD-10-CM

## 2018-05-04 DIAGNOSIS — Z00.00 ROUTINE GENERAL MEDICAL EXAMINATION AT A HEALTH CARE FACILITY: Primary | ICD-10-CM

## 2018-05-04 DIAGNOSIS — N18.30 CKD (CHRONIC KIDNEY DISEASE), STAGE III (HCC): ICD-10-CM

## 2018-05-04 DIAGNOSIS — E11.9 DIABETES MELLITUS TYPE 2, NONINSULIN DEPENDENT (HCC): ICD-10-CM

## 2018-05-04 DIAGNOSIS — I10 BENIGN ESSENTIAL HYPERTENSION: ICD-10-CM

## 2018-05-04 RX ORDER — LANOLIN ALCOHOL/MO/W.PET/CERES
1000 CREAM (GRAM) TOPICAL DAILY
COMMUNITY

## 2018-05-04 NOTE — PROGRESS NOTES
Chief Complaint   Patient presents with    Hypertension     fasting follow up     Diabetes    Cholesterol Problem     1. Have you been to the ER, urgent care clinic since your last visit? Hospitalized since your last visit? No    2. Have you seen or consulted any other health care providers outside of the [address] [place] [address] since your last visit? Include any pap smears or colon screening.  No

## 2018-05-04 NOTE — MR AVS SNAPSHOT
[address] 
[phone] Patient: Avi Shearer MRN: [id number] YFI:[id number] Visit Information Date & Time Provider Department Dept. Phone Encounter #  
 5/4/2018 10:00 AM Chavo Shi, [address] [facility] [phone] [id number] Upcoming Health Maintenance Date Due  
 EYE EXAM RETINAL OR DILATED Q1 7/6/2018 Influenza Age 5 to Adult 8/1/2018 HEMOGLOBIN A1C Q6M 11/4/2018 LIPID PANEL Q1 11/8/2018 FOOT EXAM Q1 5/4/2019 MICROALBUMIN Q1 5/4/2019 MEDICARE YEARLY EXAM 5/5/2019 GLAUCOMA SCREENING Q2Y 7/6/2019 DTaP/Tdap/Td series (2 - Td) 2/3/2027 COLONOSCOPY 6/23/2027 Allergies as of 5/4/2018  Review Complete On: 5/4/2018 By: [address], MD  
  
 Severity Noted Reaction Type Reactions Ace Inhibitors  02/28/2013    Other (comments) Renal insufficiency Hydrochlorothiazide  08/05/2015    Other (comments) Stopped d/t renal insufficiency, followed by Nephrologist  
  
Current Immunizations  Reviewed on 5/4/2018 Name Date Influenza High Dose Vaccine PF 10/2/2017, 12/7/2015 Pneumococcal Conjugate (PCV-13) 1/1/2018 ZZZ-RETIRED (DO NOT USE) Pneumococcal Vaccine (Unspecified Type) 1/1/2010 Zoster 10/12/2012 Reviewed by [address], MD on 5/4/2018 at 10:38 AM  
You Were Diagnosed With   
  
 Codes Comments Routine general medical examination at a health care facility    -  Primary ICD-10-CM: Z00.00 ICD-9-CM: V70.0 Diabetes mellitus type 2, noninsulin dependent ([facility]     ICD-10-CM: E11.9 ICD-9-CM: 250.00 Hypercholesterolemia     ICD-10-CM: E78.00 ICD-9-CM: 272.0 Benign essential hypertension     ICD-10-CM: I10 
ICD-9-CM: 401.1 CKD (chronic kidney disease), stage III     ICD-10-CM: N18.3 ICD-9-CM: 218. 3 Vitals BP Pulse Temp Resp Height(growth percentile) Weight(growth percentile) 138/84 (BP 1 Location: Left arm, BP Patient Position: Sitting) 72 97.8 °F (36.6 °C) (Oral) 18 5' 5\" (1.651 m) 226 lb 9.6 oz (102.8 kg) LMP SpO2 BMI OB Status Smoking Status 07/25/2000 93% 37.71 kg/m2 Hysterectomy Never Smoker BMI and BSA Data Body Mass Index Body Surface Area  
 37.71 kg/m 2 2.17 m 2 Preferred Pharmacy Pharmacy Name Phone [facility], [address] [place] [address] [address] [place] [address] Your Updated Medication List  
  
   
This list is accurate as of 5/4/18 10:53 AM.  Always use your most recent med list.  
  
  
  
  
 acetaminophen 500 mg tablet Commonly known as:  TYLENOL Take 500 mg by mouth daily as needed for Pain. amLODIPine 2.5 mg tablet Commonly known as:  Point Lay Bars TAKE 1 TABLET EVERY DAY FOR HYPERTENSION  
  
 aspirin 81 mg tablet Take 81 mg by mouth daily. bumetanide 0.5 mg tablet Commonly known as:  Selena Carry Take 1 Tab by mouth daily as needed. Take with potassium rich food or beverage  Indications: Edema CENTRUM SILVER PO Take  by mouth.  
  
 cyanocobalamin 1,000 mcg tablet Take 1,000 mcg by mouth daily. FISH OIL PO Take 1 Cap by mouth daily. metoprolol succinate 25 mg XL tablet Commonly known as:  TOPROL-XL  
TAKE ONE TABLET BY MOUTH DAILY FOR HYPERTENSION  
  
 VITAMIN D3 1,000 unit tablet Generic drug:  cholecalciferol Take  by mouth daily. We Performed the Following HEMOGLOBIN A1C WITH EAG [36674 CPT(R)]  DIABETES FOOT EXAM [HM7 Custom] LIPID PANEL [93783 CPT(R)] METABOLIC PANEL, COMPREHENSIVE [63086 CPT(R)] MICROALBUMIN, UR, RAND W/ MICROALB/CREAT RATIO [id number] CPT(R)] TSH 3RD GENERATION [07540 CPT(R)] Patient Instructions Body Mass Index: Care Instructions Your Care Instructions Body mass index (BMI) can help you see if your weight is raising your risk for health problems. It uses a formula to compare how much you weigh with how tall you are. · A BMI lower than 18.5 is considered underweight. · A BMI between 18.5 and 24.9 is considered healthy. · A BMI between 25 and 29.9 is considered overweight. A BMI of 30 or higher is considered obese. If your BMI is in the normal range, it means that you have a lower risk for weight-related health problems. If your BMI is in the overweight or obese range, you may be at increased risk for weight-related health problems, such as high blood pressure, heart disease, stroke, arthritis or joint pain, and diabetes. If your BMI is in the underweight range, you may be at increased risk for health problems such as fatigue, lower protection (immunity) against illness, muscle loss, bone loss, hair loss, and hormone problems. BMI is just one measure of your risk for weight-related health problems. You may be at higher risk for health problems if you are not active, you eat an unhealthy diet, or you drink too much alcohol or use tobacco products. Follow-up care is a key part of your treatment and safety. Be sure to make and go to all appointments, and call your doctor if you are having problems. It's also a good idea to know your test results and keep a list of the medicines you take. How can you care for yourself at home? · Practice healthy eating habits. This includes eating plenty of fruits, vegetables, whole grains, lean protein, and low-fat dairy. · If your doctor recommends it, get more exercise. Walking is a good choice. Bit by bit, increase the amount you walk every day. Try for at least 30 minutes on most days of the week. · Do not smoke. Smoking can increase your risk for health problems. If you need help quitting, talk to your doctor about stop-smoking programs and medicines. These can increase your chances of quitting for good. · Limit alcohol to 2 drinks a day for men and 1 drink a day for women.  Too much alcohol can cause health problems. If you have a BMI higher than 25 · Your doctor may do other tests to check your risk for weight-related health problems. This may include measuring the distance around your waist. A waist measurement of more than 40 inches in men or 35 inches in women can increase the risk of weight-related health problems. · Talk with your doctor about steps you can take to stay healthy or improve your health. You may need to make lifestyle changes to lose weight and stay healthy, such as changing your diet and getting regular exercise. If you have a BMI lower than 18.5 · Your doctor may do other tests to check your risk for health problems. · Talk with your doctor about steps you can take to stay healthy or improve your health. You may need to make lifestyle changes to gain or maintain weight and stay healthy, such as getting more healthy foods in your diet and doing exercises to build muscle. Where can you learn more? Go to http://yumi-raquel.info/. Enter S176 in the search box to learn more about \"Body Mass Index: Care Instructions. \" Current as of: October 13, 2016 Content Version: 11.4 © 7337-9450 [employer]. Care instructions adapted under license by [employer] (which disclaims liability or warranty for this information). If you have questions about a medical condition or this instruction, always ask your healthcare professional. [address] any warranty or liability for your use of this information. Diabetes Foot Health: Care Instructions Your Care Instructions When you have diabetes, your feet need extra care and attention. Diabetes can damage the nerve endings and blood vessels in your feet, making you less likely to notice when your feet are injured.  Diabetes also limits your body's ability to fight infection and get blood to areas that need it. If you get a minor foot injury, it could become an ulcer or a serious infection. With good foot care, you can prevent most of these problems. Caring for your feet can be quick and easy. Most of the care can be done when you are bathing or getting ready for bed. Follow-up care is a key part of your treatment and safety. Be sure to make and go to all appointments, and call your doctor if you are having problems. It's also a good idea to know your test results and keep a list of the medicines you take. How can you care for yourself at home? · Keep your blood sugar close to normal by watching what and how much you eat, monitoring blood sugar, taking medicines if prescribed, and getting regular exercise. · Do not smoke. Smoking affects blood flow and can make foot problems worse. If you need help quitting, talk to your doctor about stop-smoking programs and medicines. These can increase your chances of quitting for good. · Eat a diet that is low in fats. High fat intake can cause fat to build up in your blood vessels and decrease blood flow. · Inspect your feet daily for blisters, cuts, cracks, or sores. If you cannot see well, use a mirror or have someone help you. · Take care of your feet: 
[facility] AUTHORITY your feet every day. Use warm (not hot) water. Check the water temperature with your wrists or other part of your body, not your feet. ¨ Dry your feet well. Pat them dry. Do not rub the skin on your feet too hard. Dry well between your toes. If the skin on your feet stays moist, bacteria or a fungus can grow, which can lead to infection. ¨ Keep your skin soft. Use moisturizing skin cream to keep the skin on your feet soft and prevent calluses and cracks. But do not put the cream between your toes, and stop using any cream that causes a rash. ¨ Clean underneath your toenails carefully. Do not use a sharp object to clean underneath your toenails. Use the blunt end of a nail file or other rounded tool. ¨ Trim and file your toenails straight across to prevent ingrown toenails. Use a nail clipper, not scissors. Use an emery board to smooth the edges. · Change socks daily. Socks without seams are best, because seams often rub the feet. You can find socks for people with diabetes from specialty catalogs. · Look inside your shoes every day for things like gravel or torn linings, which could cause blisters or sores. · Buy shoes that fit well: 
¨ Look for shoes that have plenty of space around the toes. This helps prevent bunions and blisters. ¨ Try on shoes while wearing the kind of socks you will usually wear with the shoes. ¨ Avoid plastic shoes. They may rub your feet and cause blisters. Good shoes should be made of materials that are flexible and breathable, such as leather or cloth. ¨ Break in new shoes slowly by wearing them for no more than an hour a day for several days. Take extra time to check your feet for red areas, blisters, or other problems after you wear new shoes. · Do not go barefoot. Do not wear sandals, and do not wear shoes with very thin soles. Thin soles are easy to puncture. They also do not protect your feet from hot pavement or cold weather. · Have your doctor check your feet during each visit. If you have a foot problem, see your doctor. Do not try to treat an early foot problem at home. Home remedies or treatments that you can buy without a prescription (such as corn removers) can be harmful. · Always get early treatment for foot problems. A minor irritation can lead to a major problem if not properly cared for early. When should you call for help? Call your doctor now or seek immediate medical care if: 
? · You have a foot sore, an ulcer or break in the skin that is not healing after 4 days, bleeding corns or calluses, or an ingrown toenail. ? · You have blue or black areas, which can mean bruising or blood flow problems. ? · You have peeling skin or tiny blisters between your toes or cracking or oozing of the skin. ? · You have a fever for more than 24 hours and a foot sore. ? · You have new numbness or tingling in your feet that does not go away after you move your feet or change positions. ? · You have unexplained or unusual swelling of the foot or ankle. ? Watch closely for changes in your health, and be sure to contact your doctor if: 
? · You cannot do proper foot care. Where can you learn more? Go to http://yumi-raquel.info/. Enter A739 in the search box to learn more about \"Diabetes Foot Health: Care Instructions. \" Current as of: March 13, 2017 Content Version: 11.4 © 6633-3723 [employer]. Care instructions adapted under license by [employer]) (which disclaims liability or warranty for this information). If you have questions about a medical condition or this instruction, always ask your healthcare professional. [place] [address] any warranty or liability for your use of this information. Schedule of Personalized Health Plan (Provide Copy to Patient) The best way to stay healthy is to live a healthy lifestyle. A healthy lifestyle includes regular exercise, eating a well-balanced diet, keeping a healthy weight and not smoking. Regular physical exams and screening tests are another important way to take care of yourself. Preventive exams provided by health care providers can find health problems early when treatment works best and can keep you from getting certain diseases or illnesses. Preventive services include exams, lab tests, screenings, shots, monitoring and information to help you take care of your own health. All people over 65 should have a pneumonia shot. Pneumonia shots are usually only needed once in a lifetime unless your doctor decides differently. All people over 65 should have a yearly flu shot. People over 65 are at medium to high risk for Hepatitis B. Three shots are needed for complete protection. In addition to your physical exam, some screening tests are recommended: 
 
Bone mass measurement (dexa scan) is recommended every two years Diabetes Mellitus screening is recommended every year. Glaucoma is an eye disease caused by high pressure in the eye. An eye exam is recommended every year. Cardiovascular screening tests that check your cholesterol and other blood fat (lipid) levels are recommended every five years. Colorectal Cancer screening tests help to find pre-cancerous polyps (growths in the colon) so they can be removed before they turn into cancer. Tests ordered for screening depend on your personal and family history risk factors. Screening for Breast Cancer is recommended yearly with a mammogram. 
 
Screening for Cervical Cancer is recommended every two years (annually for certain risk factors, such as previous history of STD or abnormal PAP in past 7 years), with a Pelvic Exam with PAP Here is a list of your current Health Maintenance items with a due date: 
Health Maintenance Topic Date Due  
 EYE EXAM RETINAL OR DILATED Q1  07/06/2018  Influenza Age 5 to Adult  08/01/2018  HEMOGLOBIN A1C Q6M  11/04/2018  LIPID PANEL Q1  11/08/2018  
 FOOT EXAM Q1  05/04/2019  MICROALBUMIN Q1  05/04/2019  MEDICARE YEARLY EXAM  05/05/2019  GLAUCOMA SCREENING Q2Y  07/06/2019  
 DTaP/Tdap/Td series (2 - Td) 02/03/2027  COLONOSCOPY  06/23/2027  Bone Densitometry (Dexa) Screening  Completed  ZOSTER VACCINE AGE 60>  Completed  Pneumococcal 65+ Low/Medium Risk  Completed Introducing [facility]! [facility] introduces [employer] patient portal. Now you can access parts of your medical record, email your doctor's office, and request medication refills online. 1. In your internet browser, go to [email or web address] Giveo/Health Enhancement Products 2. Click on the First Time User? Click Here link in the Sign In box. You will see the New Member Sign Up page. 3. Enter your "[employer] Access Code exactly as it appears below. You will not need to use this code after youve completed the sign-up process. If you do not sign up before the expiration date, you must request a new code. · "[employer] Access Code: [email or web address] Expires: 8/2/2018 10:53 AM 
 
4. Enter the last four digits of your Social Security Number (xxxx) and Date of Birth (mm/dd/yyyy) as indicated and click Submit. You will be taken to the next sign-up page. 5. Create a "[employer] ID. This will be your "[employer] login ID and cannot be changed, so think of one that is secure and easy to remember. 6. Create a "[employer] password. You can change your password at any time. 7. Enter your Password Reset Question and Answer. This can be used at a later time if you forget your password. 8. Enter your e-mail address. You will receive e-mail notification when new information is available in [address] 9. Click Sign Up. You can now view and download portions of your medical record. 10. Click the Download Summary menu link to download a portable copy of your medical information. If you have questions, please visit the Frequently Asked Questions section of the "[employer] website. Remember, "[employer] is NOT to be used for urgent needs. For medical emergencies, dial 911. Now available from your iPhone and Android! Please provide this summary of care documentation to your next provider. Your primary care clinician is listed as MYLENE MARS. If you have any questions after today's visit, please call [phone].

## 2018-05-04 NOTE — ACP (ADVANCE CARE PLANNING)
ACP discussion w/ pt again today. Still not completed from information given last year. AMD form reviewed and another copy provided. NN/facilitator to discuss with patient.

## 2018-05-04 NOTE — PROGRESS NOTES
Marquise Olvera is a 74 y.o. female and presents for annual Medicare Wellness Visit. Problem List: Reviewed with patient and discussed risk factors. Patient Active Problem List   Diagnosis Code     (normal spontaneous vaginal delivery) x3 O80    Miscarriage x 2 O03.9    S/P D&C (status post dilation and curettage) Z98.890    S/p laparoscopic cholecystectomy gallstones Z90.49    S/p Rt bunionectomy Z98.890    Lt Ankle edema, s/p surgery M25.473    Bilateral Dependent Foot & Ankle Edema, L>R R60.9    Hypercholesterolemia E78.00    Mild renal insufficiency N28.9    Chest pain, unspecified R07.9    Dyspepsia & Heartburn R10.13    Impaired fasting glucose R73.01    CKD (chronic kidney disease), stage III N18.3    Vitamin D deficiency E55.9    Benign essential hypertension I10    Diabetes mellitus type 2, noninsulin dependent (HCC) E11.9    ACP (advance care planning) Z71.89    Severe obesity (BMI 35.0-39. 9) with comorbidity ([facility] E66.01       Current medical providers:  Patient Care Team:  Guera Hopkins MD as PCP - Carlos A Ledezma MD (Nephrology)    PSH: Reviewed with patient  Past Surgical History:   Procedure Laterality Date    CARDIAC CATHETERIZATION  2012    normal, Dr Deirdre Tolbert, but given NTG just in case to use prn    COLONOSCOPY  ~    normal, f/u 10 yrs; Dr Quynh Palmer  2012    Dr Des Escobedo, ok per pt except diverticulosis, small benign polyp; repeat 5 yrs    HX BUNIONECTOMY Right     right    HX BUNIONECTOMY Left 12/15/2015    Dr. Felix Cap HX COLONOSCOPY N/A 2017    Dr Des Escobedo - follow up in 10 years    HX [address]  x2    SAB x 1, AUB x1    HX KNEE REPLACEMENT  2010    Right TKR; Dr Obregon Arm  ~    AUB, benign disease    LAP,CHOLECYSTECTOMY      gallstones    LEG/ANKLE SURGERY PROC UNLISTED      left ankle surgery    JARRELL MAMMOGRAM SCREEN BILAT  annually [facility]        SH: Reviewed with patient  Social History   Substance Use Topics    Smoking status: Never Smoker    Smokeless tobacco: Never Used    Alcohol use Yes      Comment: rarely       FH: Reviewed with patient  Family History   Problem Relation Age of Onset    Hypertension Mother     Arthritis-osteo Mother      B TKR   Ania Martin Arthritis-rheumatoid Mother      juvenile    Breast Cancer Mother 80    Other Mother      Diverticulitis    Hypertension Father    Ania Martin Diabetes Father       age 80 kidney failure; dialysis pt    Diabetes Sister     Cancer Brother       age 46 \"bone cancer\"    Alcohol abuse Brother       of cirrhosis age 58    Heart Disease Maternal Grandmother       at 80    Heart Disease Paternal Grandmother       at 80    Heart Attack Paternal Grandfather       in his 66's   Ania Martin Anesth Problems Neg Hx        Medications/Allergies: Reviewed with patient  Current Outpatient Prescriptions on File Prior to Visit   Medication Sig Dispense Refill    bumetanide (BUMEX) 0.5 mg tablet Take 1 Tab by mouth daily as needed. Take with potassium rich food or beverage  Indications: Edema 90 Tab 1    amLODIPine (NORVASC) 2.5 mg tablet TAKE 1 TABLET EVERY DAY FOR HYPERTENSION 90 Tab 0    metoprolol succinate (TOPROL-XL) 25 mg XL tablet TAKE ONE TABLET BY MOUTH DAILY FOR HYPERTENSION 90 Tab 0    acetaminophen (TYLENOL) 500 mg tablet Take 500 mg by mouth daily as needed for Pain.  cholecalciferol (VITAMIN D3) 1,000 unit tablet Take  by mouth daily.  DOCOSAHEXANOIC ACID/EPA (FISH OIL PO) Take 1 Cap by mouth daily.  aspirin 81 mg tablet Take 81 mg by mouth daily. No current facility-administered medications on file prior to visit.        Allergies   Allergen Reactions    Ace Inhibitors Other (comments)     Renal insufficiency    Hydrochlorothiazide Other (comments)     Stopped d/t renal insufficiency, followed by Nephrologist       Objective:  Visit Vitals    /84 (BP 1 Location: Left arm, BP Patient Position: Sitting)    Pulse 72    Temp 97.8 °F (36.6 °C) (Oral)    Resp 18    Ht 5' 5\" (1.651 m)    Wt 226 lb 9.6 oz (102.8 kg)    LMP 07/25/2000    SpO2 93%    BMI 37.71 kg/m2    Body mass index is 37.71 kg/(m^2). Assessment of cognitive impairment: Alert and oriented x 3    Depression Screen:   PHQ over the last two weeks 5/4/2018   PHQ Not Done -   Little interest or pleasure in doing things Not at all   Feeling down, depressed or hopeless Not at all   Total Score PHQ 2 0       Fall Risk Assessment:    Fall Risk Assessment, last 12 mths 5/4/2018   Able to walk? Yes   Fall in past 12 months? No       Functional Ability:   Does the patient exhibit a steady gait? yes   How long did it take the patient to get up and walk from a sitting position? 2 sec   Is the patient self reliant?  (ie can do own laundry, meals, household chores)  yes     Does the patient handle his/her own medications? yes     Does the patient handle his/her own money? yes     Is the patients home safe (ie good lighting, handrails on stairs and bath, etc.)? yes     Did you notice or did patient express any hearing difficulties? no     Did you notice or did patient express any vision difficulties?   no     Were distance and reading eye charts used? no       Advance Care Planning:   Patient was offered the opportunity to discuss advance care planning:  yes     Does patient have an Advance Directive:  no   If no, did you provide information on Caring Connections?   yes       Plan:      Orders Placed This Encounter    HEMOGLOBIN A1C WITH EAG    MICROALBUMIN, UR, RAND W/ MICROALB/CREAT RATIO    LIPID PANEL    METABOLIC PANEL, COMPREHENSIVE    TSH 3RD GENERATION     DIABETES FOOT EXAM    folic acid/multivit-min/lutein (CENTRUM SILVER PO)    cyanocobalamin 1,000 mcg tablet       Health Maintenance   Topic Date Due    EYE EXAM RETINAL OR DILATED Q1  07/06/2018    Influenza Age 5 to Adult  08/01/2018    HEMOGLOBIN A1C Q6M  11/04/2018    LIPID PANEL Q1  11/08/2018    FOOT EXAM Q1  05/04/2019    MICROALBUMIN Q1  05/04/2019    MEDICARE YEARLY EXAM  05/05/2019    GLAUCOMA SCREENING Q2Y  07/06/2019    DTaP/Tdap/Td series (2 - Td) 02/03/2027    COLONOSCOPY  06/23/2027    Bone Densitometry (Dexa) Screening  Completed    ZOSTER VACCINE AGE 60>  Completed    Pneumococcal 65+ Low/Medium Risk  Completed       *Patient verbalized understanding and agreement with the plan. A copy of the After Visit Summary with personalized health plan was given to the patient today.

## 2018-05-04 NOTE — PATIENT INSTRUCTIONS
Body Mass Index: Care Instructions  Your Care Instructions    Body mass index (BMI) can help you see if your weight is raising your risk for health problems. It uses a formula to compare how much you weigh with how tall you are. · A BMI lower than 18.5 is considered underweight. · A BMI between 18.5 and 24.9 is considered healthy. · A BMI between 25 and 29.9 is considered overweight. A BMI of 30 or higher is considered obese. If your BMI is in the normal range, it means that you have a lower risk for weight-related health problems. If your BMI is in the overweight or obese range, you may be at increased risk for weight-related health problems, such as high blood pressure, heart disease, stroke, arthritis or joint pain, and diabetes. If your BMI is in the underweight range, you may be at increased risk for health problems such as fatigue, lower protection (immunity) against illness, muscle loss, bone loss, hair loss, and hormone problems. BMI is just one measure of your risk for weight-related health problems. You may be at higher risk for health problems if you are not active, you eat an unhealthy diet, or you drink too much alcohol or use tobacco products. Follow-up care is a key part of your treatment and safety. Be sure to make and go to all appointments, and call your doctor if you are having problems. It's also a good idea to know your test results and keep a list of the medicines you take. How can you care for yourself at home? · Practice healthy eating habits. This includes eating plenty of fruits, vegetables, whole grains, lean protein, and low-fat dairy. · If your doctor recommends it, get more exercise. Walking is a good choice. Bit by bit, increase the amount you walk every day. Try for at least 30 minutes on most days of the week. · Do not smoke. Smoking can increase your risk for health problems. If you need help quitting, talk to your doctor about stop-smoking programs and medicines. These can increase your chances of quitting for good. · Limit alcohol to 2 drinks a day for men and 1 drink a day for women. Too much alcohol can cause health problems. If you have a BMI higher than 25  · Your doctor may do other tests to check your risk for weight-related health problems. This may include measuring the distance around your waist. A waist measurement of more than 40 inches in men or 35 inches in women can increase the risk of weight-related health problems. · Talk with your doctor about steps you can take to stay healthy or improve your health. You may need to make lifestyle changes to lose weight and stay healthy, such as changing your diet and getting regular exercise. If you have a BMI lower than 18.5  · Your doctor may do other tests to check your risk for health problems. · Talk with your doctor about steps you can take to stay healthy or improve your health. You may need to make lifestyle changes to gain or maintain weight and stay healthy, such as getting more healthy foods in your diet and doing exercises to build muscle. Where can you learn more? Go to http://yumi-raquel.info/. Enter S176 in the search box to learn more about \"Body Mass Index: Care Instructions. \"  Current as of: October 13, 2016  Content Version: 11.4  © 1366-0760 [employer]. Care instructions adapted under license by [employer] (which disclaims liability or warranty for this information). If you have questions about a medical condition or this instruction, always ask your healthcare professional. [place] [address] any warranty or liability for your use of this information. Diabetes Foot Health: Care Instructions  Your Care Instructions    When you have diabetes, your feet need extra care and attention. Diabetes can damage the nerve endings and blood vessels in your feet, making you less likely to notice when your feet are injured.  Diabetes also limits your body's ability to fight infection and get blood to areas that need it. If you get a minor foot injury, it could become an ulcer or a serious infection. With good foot care, you can prevent most of these problems. Caring for your feet can be quick and easy. Most of the care can be done when you are bathing or getting ready for bed. Follow-up care is a key part of your treatment and safety. Be sure to make and go to all appointments, and call your doctor if you are having problems. It's also a good idea to know your test results and keep a list of the medicines you take. How can you care for yourself at home? · Keep your blood sugar close to normal by watching what and how much you eat, monitoring blood sugar, taking medicines if prescribed, and getting regular exercise. · Do not smoke. Smoking affects blood flow and can make foot problems worse. If you need help quitting, talk to your doctor about stop-smoking programs and medicines. These can increase your chances of quitting for good. · Eat a diet that is low in fats. High fat intake can cause fat to build up in your blood vessels and decrease blood flow. · Inspect your feet daily for blisters, cuts, cracks, or sores. If you cannot see well, use a mirror or have someone help you. · Take care of your feet:  [facility] your feet every day. Use warm (not hot) water. Check the water temperature with your wrists or other part of your body, not your feet. ¨ Dry your feet well. Pat them dry. Do not rub the skin on your feet too hard. Dry well between your toes. If the skin on your feet stays moist, bacteria or a fungus can grow, which can lead to infection. ¨ Keep your skin soft. Use moisturizing skin cream to keep the skin on your feet soft and prevent calluses and cracks. But do not put the cream between your toes, and stop using any cream that causes a rash. ¨ Clean underneath your toenails carefully.  Do not use a sharp object to clean underneath your toenails. Use the blunt end of a nail file or other rounded tool. ¨ Trim and file your toenails straight across to prevent ingrown toenails. Use a nail clipper, not scissors. Use an emery board to smooth the edges. · Change socks daily. Socks without seams are best, because seams often rub the feet. You can find socks for people with diabetes from specialty catalogs. · Look inside your shoes every day for things like gravel or torn linings, which could cause blisters or sores. · Buy shoes that fit well:  ¨ Look for shoes that have plenty of space around the toes. This helps prevent bunions and blisters. ¨ Try on shoes while wearing the kind of socks you will usually wear with the shoes. ¨ Avoid plastic shoes. They may rub your feet and cause blisters. Good shoes should be made of materials that are flexible and breathable, such as leather or cloth. ¨ Break in new shoes slowly by wearing them for no more than an hour a day for several days. Take extra time to check your feet for red areas, blisters, or other problems after you wear new shoes. · Do not go barefoot. Do not wear sandals, and do not wear shoes with very thin soles. Thin soles are easy to puncture. They also do not protect your feet from hot pavement or cold weather. · Have your doctor check your feet during each visit. If you have a foot problem, see your doctor. Do not try to treat an early foot problem at home. Home remedies or treatments that you can buy without a prescription (such as corn removers) can be harmful. · Always get early treatment for foot problems. A minor irritation can lead to a major problem if not properly cared for early. When should you call for help? Call your doctor now or seek immediate medical care if:  ? · You have a foot sore, an ulcer or break in the skin that is not healing after 4 days, bleeding corns or calluses, or an ingrown toenail.    ? · You have blue or black areas, which can mean bruising or blood flow problems. ? · You have peeling skin or tiny blisters between your toes or cracking or oozing of the skin. ? · You have a fever for more than 24 hours and a foot sore. ? · You have new numbness or tingling in your feet that does not go away after you move your feet or change positions. ? · You have unexplained or unusual swelling of the foot or ankle. ? Watch closely for changes in your health, and be sure to contact your doctor if:  ? · You cannot do proper foot care. Where can you learn more? Go to http://yumi-raquel.info/. Enter A739 in the search box to learn more about \"Diabetes Foot Health: Care Instructions. \"  Current as of: March 13, 2017  Content Version: 11.4  © 7009-2247 [employer]. Care instructions adapted under license by [employer] (which disclaims liability or warranty for this information). If you have questions about a medical condition or this instruction, always ask your healthcare professional. [place] [address] any warranty or liability for your use of this information. Schedule of Personalized Health Plan  (Provide Copy to Patient)  The best way to stay healthy is to live a healthy lifestyle. A healthy lifestyle includes regular exercise, eating a well-balanced diet, keeping a healthy weight and not smoking. Regular physical exams and screening tests are another important way to take care of yourself. Preventive exams provided by health care providers can find health problems early when treatment works best and can keep you from getting certain diseases or illnesses. Preventive services include exams, lab tests, screenings, shots, monitoring and information to help you take care of your own health. All people over 65 should have a pneumonia shot. Pneumonia shots are usually only needed once in a lifetime unless your doctor decides differently. All people over 65 should have a yearly flu shot.     People over 65 are at medium to high risk for Hepatitis B. Three shots are needed for complete protection. In addition to your physical exam, some screening tests are recommended:    Bone mass measurement (dexa scan) is recommended every two years  Diabetes Mellitus screening is recommended every year. Glaucoma is an eye disease caused by high pressure in the eye. An eye exam is recommended every year. Cardiovascular screening tests that check your cholesterol and other blood fat (lipid) levels are recommended every five years. Colorectal Cancer screening tests help to find pre-cancerous polyps (growths in the colon) so they can be removed before they turn into cancer. Tests ordered for screening depend on your personal and family history risk factors.     Screening for Breast Cancer is recommended yearly with a mammogram.    Screening for Cervical Cancer is recommended every two years (annually for certain risk factors, such as previous history of STD or abnormal PAP in past 7 years), with a Pelvic Exam with PAP    Here is a list of your current Health Maintenance items with a due date:  Health Maintenance   Topic Date Due    EYE EXAM RETINAL OR DILATED Q1  07/06/2018    Influenza Age 9 to Adult  08/01/2018    HEMOGLOBIN A1C Q6M  11/04/2018    LIPID PANEL Q1  11/08/2018    FOOT EXAM Q1  05/04/2019    MICROALBUMIN Q1  05/04/2019    MEDICARE YEARLY EXAM  05/05/2019    GLAUCOMA SCREENING Q2Y  07/06/2019    DTaP/Tdap/Td series (2 - Td) 02/03/2027    COLONOSCOPY  06/23/2027    Bone Densitometry (Dexa) Screening  Completed    ZOSTER VACCINE AGE 60>  Completed    Pneumococcal 65+ Low/Medium Risk  Completed

## 2018-05-04 NOTE — ASSESSMENT & PLAN NOTE
Stable, based on history, physical exam and review of pertinent labs, studies and medications; meds reconciled; lifestyle modifications recommended. Key Antihyperlipidemia Meds             DOCOSAHEXANOIC ACID/EPA (FISH OIL PO)  (Taking) Take 1 Cap by mouth daily.         Lab Results  Component Value Date/Time   Cholesterol, total 209 (H) 11/08/2017 11:13 AM   HDL Cholesterol 64 11/08/2017 11:13 AM   LDL, calculated 125 (H) 11/08/2017 11:13 AM   Triglyceride 100 11/08/2017 11:13 AM   CHOL/HDL Ratio 3.6 05/03/2010 11:36 AM

## 2018-05-04 NOTE — ASSESSMENT & PLAN NOTE
Stable, based on history, physical exam and review of pertinent labs, studies and medications; meds reconciled; continue current treatment plan, lifestyle modifications recommended. Key Antihyperglycemic Medications     Patient is on no antihyperglycemic meds. Other Key Diabetic Medications             DOCOSAHEXANOIC ACID/EPA (FISH OIL PO)  (Taking) Take 1 Cap by mouth daily.         Lab Results   Component Value Date/Time    Hemoglobin A1c 6.0 11/08/2017 11:13 AM    Glucose 104 11/08/2017 11:13 AM    Creatinine 1.03 11/08/2017 11:13 AM    Microalb/Creat ratio (ug/mg creat.) 43.8 02/03/2017 10:57 AM    Cholesterol, total 209 11/08/2017 11:13 AM    HDL Cholesterol 64 11/08/2017 11:13 AM    LDL, calculated 125 11/08/2017 11:13 AM    Triglyceride 100 11/08/2017 11:13 AM     Diabetic Foot and Eye Exam HM Status   Topic Date Due    Eye Exam  07/06/2018    Diabetic Foot Care  05/04/2019

## 2018-05-04 NOTE — ASSESSMENT & PLAN NOTE
Patient counseled about current BMI, goals, diet, nutrition, exercise, weight management. Nutrition/meal planning discussed. Monitor weights. Appropriate patient education materials included with or without corresponding AVS via handout or MyChart if activated. Reassess at next follow up visit.

## 2018-05-04 NOTE — PROGRESS NOTES
HISTORY OF PRESENT ILLNESS   HPI  Fasting follow up diabetes, hypercholesterolemia, hypertension, CKD, labs and medication check. Overall getting along well and feeling well in general.  Doing well on current medication regimen tolerating well w/o reaction or side effects. No home blood glucose monitoring, personal preference. HgBA1c's have been stable last checks here 6.3 and 6.0 most recently. Not doing anything special w/ diet, eat what she wants to eat. No exercise. Wt running in her usual range. REVIEW OF SYMPTOMS     Review of Systems   Constitutional: Negative. Eyes: Negative. Respiratory: Negative. Cardiovascular: Negative. Gastrointestinal: Negative. Genitourinary: Negative. Neurological: Negative. Negative for dizziness, tingling, sensory change and headaches. Endo/Heme/Allergies: Negative.            PROBLEM LIST/MEDICAL HISTORY      Problem List  Date Reviewed: 5/4/2018          Codes Class Noted    Severe obesity (BMI 35.0-39. 9) with comorbidity [facility]) ICD-10-CM: E66.01  ICD-9-CM: 278.01  5/4/2018        ACP (advance care planning) ICD-10-CM: Z71.89  ICD-9-CM: V65.49  5/2/2017    Overview Addendum 5/4/2018 10:52 AM by Zack Magana MD     Initial ACP discussion [phone]. AMD form reviewed and copy provided. NN/facilitator to discuss with patient.  Discussion and info again 5-2018               Diabetes mellitus type 2, noninsulin dependent ([facility] ICD-10-CM: E11.9  ICD-9-CM: 250.00  8/15/2016    Overview Signed 8/15/2016 10:36 AM by Zack Magana MD     5/2012, HgbA1c 6.5             Benign essential hypertension ICD-10-CM: I10  ICD-9-CM: 401.1  5/16/2016    Overview Signed 5/16/2016 11:03 AM by Zack Magana MD     DX 4/09             CKD (chronic kidney disease), stage III ICD-10-CM: N18.3  ICD-9-CM: 585.3  2/28/2013    Overview Addendum 2/9/2016 10:57 AM by Zack Magana MD     2012; Dr Iris Elias al; Renal US 2/2013             Vitamin D deficiency ICD-10-CM: E55.9  ICD-9-CM: 268.9  2013    Overview Signed 2013  9:25 AM by Shanice Corbin MD     2013; weekly rx vit D per Nephrology             Dyspepsia & Heartburn ICD-10-CM: R10.13  ICD-9-CM: 536.8  2012    Overview Signed 2012  9:00 AM by Shanice Corbin MD     , cardiac w/u negative; improved w/ Pepcid             Impaired fasting glucose ICD-10-CM: R73.01  ICD-9-CM: 790.21  2012    Overview Signed 2012  9:07 AM by Shanice Corbin MD                  Chest pain, unspecified ICD-10-CM: R07.9  ICD-9-CM: 786.50  2012    Overview Signed 2012  9:00 AM by Shanice Corbin MD     2012, Dr Nayan Cabrales; Cardiac cath negative, GI related             Hypercholesterolemia ICD-10-CM: E78.00  ICD-9-CM: 272.0  2011        Mild renal insufficiency ICD-10-CM: N28.9  ICD-9-CM: 593.9  2011    Overview Signed 2013  9:22 AM by Shanice Corbin MD                  Bilateral Dependent Foot & Ankle Edema, L>R ICD-10-CM: R60.9  ICD-9-CM: 782.3  2010         (normal spontaneous vaginal delivery) x3 ICD-10-CM: O80  ICD-9-CM: 650  Unknown    Overview Signed 5/3/2010 10:46 AM by Shanice Corbin MD      x4, 1 fetal demise after delivery at 11mos             Miscarriage x 2 ICD-10-CM: O03.9  ICD-9-CM: 634.90  Unknown        S/P D&C (status post dilation and curettage) ICD-10-CM: W68.648  ICD-9-CM: V45.89  Unknown    Overview Signed 2010 12:08 PM by Shanice Corbin MD     X1, SAB  x1, AUB             S/p laparoscopic cholecystectomy gallstones ICD-10-CM: Z90.49  ICD-9-CM: V45.89  Unknown    Overview Signed 5/3/2010 10:48 AM by Shanice Corbin MD                  S/p Rt bunionectomy ICD-10-CM: P58.650  ICD-9-CM: V45.89  Unknown        Lt Ankle edema, s/p surgery ICD-10-CM: M25.473  ICD-9-CM: 719.07  Unknown    Overview Signed 5/3/2010 10:49 AM by Shanice Corbin MD     Early 3907'Z     PAST SURGICAL HISTORY       Past Surgical History:   Procedure Laterality Date    CARDIAC CATHETERIZATION  1/27/2012    normal, Dr Jovanna Mejia, but given NTG just in case to use prn    COLONOSCOPY  ~2000    normal, f/u 10 yrs; Dr Reyes Pao  06/04/2012    Dr Denis Smith, ok per pt except diverticulosis, small benign polyp; repeat 5 yrs    HX BUNIONECTOMY Right     right    HX BUNIONECTOMY Left 12/15/2015    Dr. Preet Horn HX COLONOSCOPY N/A 06/23/2017    Dr Denis Smith - follow up in 10 years    HX DILATION AND CURETTAGE  x2    SAB x 1, AUB x1    HX KNEE REPLACEMENT  6/2010    Right TKR; Dr Cheri Sharpe, benign disease    LAP,CHOLECYSTECTOMY  1999    gallstones    LEG/ANKLE SURGERY PROC UNLISTED  1990's    left ankle surgery    JARRELL MAMMOGRAM SCREEN BILAT  annually    [facility]      Current Outpatient Prescriptions   Medication Sig    folic acid/multivit-min/lutein (CENTRUM SILVER PO) Take  by mouth.  cyanocobalamin 1,000 mcg tablet Take 1,000 mcg by mouth daily.  bumetanide (BUMEX) 0.5 mg tablet Take 1 Tab by mouth daily as needed. Take with potassium rich food or beverage  Indications: Edema    amLODIPine (NORVASC) 2.5 mg tablet TAKE 1 TABLET EVERY DAY FOR HYPERTENSION    metoprolol succinate (TOPROL-XL) 25 mg XL tablet TAKE ONE TABLET BY MOUTH DAILY FOR HYPERTENSION    acetaminophen (TYLENOL) 500 mg tablet Take 500 mg by mouth daily as needed for Pain.  cholecalciferol (VITAMIN D3) 1,000 unit tablet Take  by mouth daily.  DOCOSAHEXANOIC ACID/EPA (FISH OIL PO) Take 1 Cap by mouth daily.  aspirin 81 mg tablet Take 81 mg by mouth daily. No current facility-administered medications for this visit.            ALLERGIES     Allergies   Allergen Reactions    Ace Inhibitors Other (comments)     Renal insufficiency    Hydrochlorothiazide Other (comments)     Stopped d/t renal insufficiency, followed by Nephrologist          SOCIAL HISTORY       Social History     Social History    Marital status:      Spouse name: N/A    Number of children: 3    Years of education: N/A     Occupational History    Retired [employer]      Social History Main Topics    Smoking status: Never Smoker    Smokeless tobacco: Never Used    Alcohol use Yes      Comment: rarely    Drug use: No    Sexual activity: Yes     Partners: Male     Other Topics Concern     Service No    Blood Transfusions No    Caffeine Concern No     1 bottle of 16 oz of Pepsi a day; this is down considerably for her    Occupational Exposure No    Hobby Hazards No    Sleep Concern No    Stress Concern No    Special Diet No    Back Care No    Exercise No    Bike Helmet Yes    Seat Belt Yes    Self-Exams Yes     Social History Narrative    Lives at home w/ her , son and grand daughter (baby), 2 story home        IMMUNIZATIONS  Immunization History   Administered Date(s) Administered    Influenza High Dose Vaccine PF 2015, 10/02/2017    Pneumococcal Conjugate (PCV-13) 2018    ZZZ-RETIRED (DO NOT USE) Pneumococcal Vaccine (Unspecified Type) 2010    Zoster 10/12/2012         FAMILY HISTORY     Family History   Problem Relation Age of Onset    Hypertension Mother     Arthritis-osteo Mother      B TKR   Jake Galindo Arthritis-rheumatoid Mother      juvenile    Breast Cancer Mother 80    Other Mother      Diverticulitis    Hypertension Father    Jake Galindo Diabetes Father       age 80 kidney failure; dialysis pt    Diabetes Sister     Cancer Brother       age 46 \"bone cancer\"    Alcohol abuse Brother       of cirrhosis age 58    Heart Disease Maternal Grandmother       at 80    Heart Disease Paternal Grandmother       at 80    Heart Attack Paternal Grandfather       in his 66's   Jake Galindo Anesth Problems Neg Hx          VITALS     Visit Vitals    /84 (BP 1 Location: Left arm, BP Patient Position: Sitting)    Pulse 72    Temp 97.8 °F (36.6 °C) (Oral)    Resp 18    Ht 5' 5\" (1.651 m)    Wt 226 lb 9.6 oz (102.8 kg)    LMP 07/25/2000    SpO2 93%    BMI 37.71 kg/m2          PHYSICAL EXAMINATION     Physical Exam   Constitutional: No distress. Neck: Neck supple. No JVD present. Carotid bruit is not present. No thyromegaly present. Cardiovascular: Normal rate, regular rhythm, normal heart sounds and intact distal pulses. No murmur heard. Pulmonary/Chest: Effort normal and breath sounds normal.   Abdominal: Soft. There is no tenderness. Musculoskeletal: She exhibits no tenderness. Mild non pitting B foot and ankle edema. Skin warm, intact. Neurological: Gait normal.   Normal diabetic foot exam: Normal monofilament testing B. Skin warm, dry, intact. DP/PT pulses strong and intact. No skin breakdown, wounds, ulcers, lesions. Vitals reviewed.              ASSESSMENT & PLAN   Diagnoses and all orders for this visit:    1. Routine general medical examination at a health care facility    2. Diabetes mellitus type 2, noninsulin dependent ([facility]  Assessment & Plan:  Stable, based on history, physical exam and review of pertinent labs, studies and medications; meds reconciled; continue current treatment plan, lifestyle modifications recommended. Key Antihyperglycemic Medications     Patient is on no antihyperglycemic meds. Other Key Diabetic Medications             DOCOSAHEXANOIC ACID/EPA (FISH OIL PO)  (Taking) Take 1 Cap by mouth daily.         Lab Results   Component Value Date/Time    Hemoglobin A1c 6.0 11/08/2017 11:13 AM    Glucose 104 11/08/2017 11:13 AM    Creatinine 1.03 11/08/2017 11:13 AM    Microalb/Creat ratio (ug/mg creat.) 43.8 02/03/2017 10:57 AM    Cholesterol, total 209 11/08/2017 11:13 AM    HDL Cholesterol 64 11/08/2017 11:13 AM    LDL, calculated 125 11/08/2017 11:13 AM    Triglyceride 100 11/08/2017 11:13 AM     Diabetic Foot and Eye Exam  Status   Topic Date Due    Eye Exam  07/06/2018    Diabetic Foot Care  05/04/2019       Orders:  -     HEMOGLOBIN A1C WITH EAG  -      DIABETES FOOT EXAM  -     MICROALBUMIN, UR, RAND W/ MICROALB/CREAT RATIO  -     LIPID PANEL  -     METABOLIC PANEL, COMPREHENSIVE    3. Hypercholesterolemia  Assessment & Plan:  Stable, based on history, physical exam and review of pertinent labs, studies and medications; meds reconciled; lifestyle modifications recommended. Key Antihyperlipidemia Meds             DOCOSAHEXANOIC ACID/EPA (FISH OIL PO)  (Taking) Take 1 Cap by mouth daily. Lab Results  Component Value Date/Time   Cholesterol, total 209 (H) 11/08/2017 11:13 AM   HDL Cholesterol 64 11/08/2017 11:13 AM   LDL, calculated 125 (H) 11/08/2017 11:13 AM   Triglyceride 100 11/08/2017 11:13 AM   CHOL/HDL Ratio 3.6 05/03/2010 11:36 AM       Orders:  -     LIPID PANEL  -     METABOLIC PANEL, COMPREHENSIVE  -     TSH 3RD GENERATION    4. Benign essential hypertension    5. CKD (chronic kidney disease), stage III  Assessment & Plan:  Stable, based on history, physical exam and review of pertinent labs, studies and medications; meds reconciled; continue current treatment plan. Key ACE/ARB Medications     Patient is on no ACE or ARB meds. Lab Results  Component Value Date/Time   GFR est non-AA 54 (L) 11/08/2017 11:13 AM   GFR est AA 62 11/08/2017 11:13 AM   Creatinine 1.03 (H) 11/08/2017 11:13 AM   BUN 14 11/08/2017 11:13 AM   Sodium 139 11/08/2017 11:13 AM   Potassium 5.1 11/08/2017 11:13 AM   Chloride 97 11/08/2017 11:13 AM   CO2 29 11/08/2017 11:13 AM       Orders:  -     METABOLIC PANEL, COMPREHENSIVE    6. Severe obesity (BMI 35.0-39. 9) with comorbidity [facility])  Assessment & Plan:  Patient counseled about current BMI, goals, diet, nutrition, exercise, weight management. Nutrition/meal planning discussed. Monitor weights.  Appropriate patient education materials included with or without corresponding AVS via handout or Tablelist Inchart if activated. Reassess at next follow up visit. Fasting labs today  Reviewed diet, exercise and weight control  Reviewed medications and side effects  Specific diabetic recommendations: diabetic diet discussed in detail, written exchange diet given and long term diabetic complications discussed   Further follow up & other recommendations pending review of labs.  If all remains good and stable, follow up in 6months

## 2018-05-04 NOTE — ASSESSMENT & PLAN NOTE
Stable, based on history, physical exam and review of pertinent labs, studies and medications; meds reconciled; continue current treatment plan. Key ACE/ARB Medications     Patient is on no ACE or ARB meds.         Lab Results  Component Value Date/Time   GFR est non-AA 54 (L) 11/08/2017 11:13 AM   GFR est AA 62 11/08/2017 11:13 AM   Creatinine 1.03 (H) 11/08/2017 11:13 AM   BUN 14 11/08/2017 11:13 AM   Sodium 139 11/08/2017 11:13 AM   Potassium 5.1 11/08/2017 11:13 AM   Chloride 97 11/08/2017 11:13 AM   CO2 29 11/08/2017 11:13 AM

## 2018-05-05 LAB
ALBUMIN SERPL-MCNC: 4.4 G/DL (ref 3.5–4.8)
ALBUMIN/CREAT UR: 19.8 MG/G CREAT (ref 0–30)
ALBUMIN/GLOB SERPL: 1.7 {RATIO} (ref 1.2–2.2)
ALP SERPL-CCNC: 112 IU/L (ref 39–117)
ALT SERPL-CCNC: 13 IU/L (ref 0–32)
AST SERPL-CCNC: 19 IU/L (ref 0–40)
BILIRUB SERPL-MCNC: 0.4 MG/DL (ref 0–1.2)
BUN SERPL-MCNC: 17 MG/DL (ref 8–27)
BUN/CREAT SERPL: 16 (ref 12–28)
CALCIUM SERPL-MCNC: 9.5 MG/DL (ref 8.7–10.3)
CHLORIDE SERPL-SCNC: 102 MMOL/L (ref 96–106)
CHOLEST SERPL-MCNC: 184 MG/DL (ref 100–199)
CO2 SERPL-SCNC: 24 MMOL/L (ref 18–29)
CREAT SERPL-MCNC: 1.04 MG/DL (ref 0.57–1)
CREAT UR-MCNC: 140.8 MG/DL
EST. AVERAGE GLUCOSE BLD GHB EST-MCNC: 131 MG/DL
GFR SERPLBLD CREATININE-BSD FMLA CKD-EPI: 53 ML/MIN/1.73
GFR SERPLBLD CREATININE-BSD FMLA CKD-EPI: 61 ML/MIN/1.73
GLOBULIN SER CALC-MCNC: 2.6 G/DL (ref 1.5–4.5)
GLUCOSE SERPL-MCNC: 100 MG/DL (ref 65–99)
HBA1C MFR BLD: 6.2 % (ref 4.8–5.6)
HDLC SERPL-MCNC: 57 MG/DL
INTERPRETATION, 910389: NORMAL
INTERPRETATION: NORMAL
LDLC SERPL CALC-MCNC: 104 MG/DL (ref 0–99)
MICROALBUMIN UR-MCNC: 27.9 UG/ML
PDF IMAGE, 910387: NORMAL
POTASSIUM SERPL-SCNC: 4.6 MMOL/L (ref 3.5–5.2)
PROT SERPL-MCNC: 7 G/DL (ref 6–8.5)
SODIUM SERPL-SCNC: 142 MMOL/L (ref 134–144)
TRIGL SERPL-MCNC: 116 MG/DL (ref 0–149)
TSH SERPL DL<=0.005 MIU/L-ACNC: 0.82 UIU/ML (ref 0.45–4.5)
VLDLC SERPL CALC-MCNC: 23 MG/DL (ref 5–40)

## 2018-05-27 ENCOUNTER — TELEPHONE (OUTPATIENT)
Dept: FAMILY MEDICINE CLINIC | Age: 73
End: 2018-05-27

## 2018-05-27 NOTE — TELEPHONE ENCOUNTER
Urine, liver, electrolytes, thyroid normal  Kidney fnc good and stable in her baseline range  Fasting BS good at 100  HgBA1c 6.2 which is only slightly increased from last check of 6.0 but still at goal range for age of <7. Follow recs below. Follow up 6months, sooner prn    Diabetes/Pre-Diabetes Meal Planning and Exercise:  ~Avoid sweets and sugars. ~Limit carbohydrate intake to between 30-45 grams of carbs max per meal and no more than 15 grams of carbs per snack  ~Do not skip meals. ~Eat light snacks between meals that are low in fat and high in protein. ~Divide your plate by types of foods. Put non-starchy vegetables on half the plate, meat or other protein food on one-quarter of the plate, and a grain or starchy vegetable in the final quarter of the plate. Keep starches dark in color trying to void white starches in general.  ~Limit alcohol to no more than 1 standard drink per day for women and 2 for men (ie/ 12 oz beer, 5 oz wine, 1.5 oz liquor). ~Get regular moderate intensity cardio/aerobic exercise at least 150 minutes per week ie/ 30-50 minutes 3-4 x a week. ~Reference the ADA (American Diabetes Association Diet) for additional nutrition tips. ~We can offer referral to a certified diabetes educator or our nutrition services programs if needed.

## 2018-08-06 ENCOUNTER — TELEPHONE (OUTPATIENT)
Dept: FAMILY MEDICINE CLINIC | Age: 73
End: 2018-08-06

## 2018-08-06 NOTE — TELEPHONE ENCOUNTER
----- Message from Tha Hernandez sent at 8/6/2018  2:51 PM EDT -----  Regarding: Dr. Julian Parra  The patient is requesting that a referral is sent to Dr. Mckay Franklin office for her appointment on 8/16/18 at [address] [facility]. (BFQ)[phone] (V)[phone]

## 2018-08-07 NOTE — TELEPHONE ENCOUNTER
The patient is requesting that a referral order be sent to Dr. Malia Quezada office for her appointment on 8/16/18 at [address] [place] [address]. QCL)[phone] (Z)[phone]

## 2018-08-07 NOTE — TELEPHONE ENCOUNTER
Called pt to explain that her insurance doesn't require a referral.  Pt is adamant that we have to do something. Advised that I would call the MD office to find out what they need. It ends up being [facility] and tried to explain to pt that the won't need a referral for a routine eye exam.    I called [facility] and was told that the pt was told that they ([facility]) would have to call carol to get auth to see the pt (not us). Called pt back to let her know.

## 2018-09-18 DIAGNOSIS — R60.9 DEPENDENT EDEMA: ICD-10-CM

## 2018-09-18 DIAGNOSIS — I87.2 VENOUS INSUFFICIENCY OF BOTH LOWER EXTREMITIES: ICD-10-CM

## 2018-09-18 RX ORDER — BUMETANIDE 0.5 MG/1
TABLET ORAL
Qty: 90 TAB | Refills: 1 | Status: SHIPPED | OUTPATIENT
Start: 2018-09-18 | End: 2018-12-20 | Stop reason: SDUPTHER

## 2018-11-05 ENCOUNTER — OFFICE VISIT (OUTPATIENT)
Dept: FAMILY MEDICINE CLINIC | Age: 73
End: 2018-11-05

## 2018-11-05 VITALS
HEART RATE: 76 BPM | OXYGEN SATURATION: 94 % | DIASTOLIC BLOOD PRESSURE: 68 MMHG | HEIGHT: 65 IN | SYSTOLIC BLOOD PRESSURE: 110 MMHG | RESPIRATION RATE: 18 BRPM | BODY MASS INDEX: 37.95 KG/M2 | WEIGHT: 227.8 LBS | TEMPERATURE: 98.1 F

## 2018-11-05 DIAGNOSIS — N18.30 CKD (CHRONIC KIDNEY DISEASE), STAGE III (HCC): ICD-10-CM

## 2018-11-05 DIAGNOSIS — E11.9 DIABETES MELLITUS TYPE 2, NONINSULIN DEPENDENT (HCC): Primary | ICD-10-CM

## 2018-11-05 DIAGNOSIS — E78.00 HYPERCHOLESTEROLEMIA: ICD-10-CM

## 2018-11-05 DIAGNOSIS — I10 BENIGN ESSENTIAL HYPERTENSION: ICD-10-CM

## 2018-11-05 NOTE — PROGRESS NOTES
Chief Complaint Patient presents with  Hypertension  
  fasting follow up  Cholesterol Problem  Diabetes 1. Have you been to the ER, urgent care clinic since your last visit? Hospitalized since your last visit? No 
 
2. Have you seen or consulted any other health care providers outside of the [facility] since your last visit? Include any pap smears or colon screening.  No

## 2018-11-05 NOTE — PROGRESS NOTES
HISTORY OF PRESENT ILLNESS  
HPI Fasting follow up diabetes, hypercholesterolemia, hypertension, CKD, labs and medication check. Overall has been getting along well and feeling well in general. 
No complaints at this time. Not complying w/ diabetic diet and not exercising. Wt up another lb. Not checking BS's. REVIEW OF SYMPTOMS  
  Review of Systems Constitutional: Negative. Respiratory: Negative. Cardiovascular: Negative. Gastrointestinal: Negative. Genitourinary: Negative. Neurological: Negative.   
 
 
  
 PROBLEM LIST/MEDICAL HISTORY  
  
Problem List  Date Reviewed: 11/5/2018 Codes Class Noted Severe obesity (BMI 35.0-39. 9) with comorbidity ([facility] ICD-10-CM: E66.01 
ICD-9-CM: 278.01  5/4/2018 ACP (advance care planning) ICD-10-CM: Z71.89 ICD-9-CM: V65.49  5/2/2017 Overview Addendum 5/4/2018 10:52 AM by Kristyn Soler MD  
  Initial ACP discussion [phone]. AMD form reviewed and copy provided. NN/facilitator to discuss with patient. Discussion and info again 5-2018 Diabetes mellitus type 2, noninsulin dependent ([facility] ICD-10-CM: E11.9 ICD-9-CM: 250.00  8/15/2016 Overview Signed 8/15/2016 10:36 AM by Kristyn Soler MD  
  5/2012, HgbA1c 6.5 Benign essential hypertension ICD-10-CM: I10 
ICD-9-CM: 401.1  5/16/2016 Overview Signed 5/16/2016 11:03 AM by Kristyn Soler MD  
  DX 4/09 CKD (chronic kidney disease), stage III (HCC) ICD-10-CM: N18.3 ICD-9-CM: 585.3  2/28/2013 Overview Addendum 2/9/2016 10:57 AM by Kristyn Soler MD  
  2012; Dr Marilee james; Renal US 2/2013 Vitamin D deficiency ICD-10-CM: E55.9 ICD-9-CM: 268.9  2/28/2013 Overview Signed 2/28/2013  9:25 AM by Kristyn Soler MD  
  1/2013; weekly rx vit D per Nephrology Dyspepsia & Heartburn ICD-10-CM: R10.13 ICD-9-CM: 536.8  5/16/2012 Overview Signed 2012  9:00 AM by Leydi Stallings MD  
  , cardiac w/u negative; improved w/ Pepcid Impaired fasting glucose ICD-10-CM: R73.01 
ICD-9-CM: 790.21  2012 Overview Signed 2012  9:07 AM by Leydi Stallings MD  
   Chest pain, unspecified ICD-10-CM: R07.9 ICD-9-CM: 786.50  2012 Overview Signed 2012  9:00 AM by Leydi Stallings MD  
  2012, Dr Justus López; Cardiac cath negative, GI related Hypercholesterolemia ICD-10-CM: E78.00 ICD-9-CM: 272.0  2011 Mild renal insufficiency ICD-10-CM: N28.9 ICD-9-CM: 593.9  2011 Overview Signed 2013  9:22 AM by Leydi Stallings MD  
   Bilateral Dependent Foot & Ankle Edema, L>R ICD-10-CM: R60.9 ICD-9-CM: 782.3  2010  (normal spontaneous vaginal delivery) x3 ICD-10-CM: O80 
ICD-9-CM: 650  Unknown Overview Signed 5/3/2010 10:46 AM by Leydi Stallinsg MD  
   x4, 1 fetal demise after delivery at [facility] Miscarriage x 2 ICD-10-CM: O03.9 ICD-9-CM: 634.90  Unknown S/P D&C (status post dilation and curettage) ICD-10-CM: X24.254 ICD-9-CM: V45.89  Unknown Overview Signed 2010 12:08 PM by Leydi Stallings MD  
  X1, SAB 
x1, AUB S/p laparoscopic cholecystectomy gallstones ICD-10-CM: Z90.49 ICD-9-CM: V45.89  Unknown Overview Signed 5/3/2010 10:48 AM by Leydi Stallings MD  
  4208 S/p Rt bunionectomy ICD-10-CM: A15.953 ICD-9-CM: V45.89  Unknown Lt Ankle edema, s/p surgery ICD-10-CM: M25.473 ICD-9-CM: 719.07  Unknown Overview Signed 5/3/2010 10:49 AM by Leydi Stallings MD  
  Early 6360'G 
  
  
   
  
 
 
  PAST SURGICAL HISTORY  
   
Past Surgical History:  
Procedure Laterality Date  CARDIAC CATHETERIZATION  2012  
 normal, Dr Justus López, but given NTG just in case to use prn  COLONOSCOPY  ~ normal, f/u 10 yrs; Dr Raegan Zapien  COLONOSCOPY  06/04/2012 Dr Raegan Zapien, ok per pt except diverticulosis, small benign polyp; repeat 5 yrs  HX BUNIONECTOMY Right   
 right  HX BUNIONECTOMY Left 12/15/2015 Dr. Samia Stringer HX COLONOSCOPY N/A 06/23/2017 Dr Raegan Zapien - follow up in 10 years  HX DILATION AND CURETTAGE  x2 SAB x 1, AUB x1  
 HX KNEE REPLACEMENT  6/2010 Right TKR; Dr Moris Long  HX PARTIAL HYSTERECTOMY  ~2000 AUB, benign disease  LAP,CHOLECYSTECTOMY  1999  
 gallstones  LEG/ANKLE SURGERY PROC UNLISTED  1990's  
 left ankle surgery  JARRELL MAMMOGRAM SCREEN BILAT  annually [facility] MEDICATIONS  
  
Current Outpatient Medications Medication Sig  bumetanide (BUMEX) 0.5 mg tablet TAKE 1 TABLET BY MOUTH  DAILY AS NEEDED. TAKE WITH  POTASSIUM RICH FOOD OR  BEVERAGE  amLODIPine (NORVASC) 2.5 mg tablet TAKE 1 TABLET EVERY DAY FOR HYPERTENSION  metoprolol succinate (TOPROL-XL) 25 mg XL tablet TAKE ONE TABLET BY MOUTH  DAILY FOR HYPERTENSION  folic acid/multivit-min/lutein (CENTRUM SILVER PO) Take  by mouth.  cyanocobalamin 1,000 mcg tablet Take 1,000 mcg by mouth daily.  acetaminophen (TYLENOL) 500 mg tablet Take 500 mg by mouth daily as needed for Pain.  cholecalciferol (VITAMIN D3) 1,000 unit tablet Take  by mouth daily.  DOCOSAHEXANOIC ACID/EPA (FISH OIL PO) Take 1 Cap by mouth daily.  aspirin 81 mg tablet Take 81 mg by mouth daily. No current facility-administered medications for this visit. ALLERGIES Allergies Allergen Reactions  Ace Inhibitors Other (comments) Renal insufficiency  Hydrochlorothiazide Other (comments) Stopped d/t renal insufficiency, followed by Nephrologist  
 
 
 
 SOCIAL HISTORY  
   
Social History Socioeconomic History  Marital status:  Spouse name: Not on file  Number of children: 3  
 Years of education: Not on file  Highest education level: Not on file Social Needs  Financial resource strain: Not on file  Food insecurity - worry: Not on file  Food insecurity - inability: Not on file  Transportation needs - medical: Not on file  Transportation needs - non-medical: Not on file Occupational History  Occupation: Retired [address] [place] [address] [address] Tobacco Use  Smoking status: Never Smoker  Smokeless tobacco: Never Used Substance and Sexual Activity  Alcohol use: Yes Comment: rarely  Drug use: No  
 Sexual activity: Yes  
  Partners: Male Other Topics Concern   Service No  
 Blood Transfusions No  
 Caffeine Concern No  
  Comment: 1 bottle of 16 oz of Pepsi a day; this is down considerably for her  Occupational Exposure No  
 Hobby Hazards No  
 Sleep Concern No  
 Stress Concern No  
 Weight Concern Not Asked  Special Diet No  
 Back Care No  
 Exercise No  
 Bike Helmet Yes  [address] Yes  Self-Exams Yes Social History Narrative Lives at home w/ her , son and grand daughter (baby), 2 story home  
 
  
IMMUNIZATIONS Immunization History Administered Date(s) Administered  Influenza High Dose Vaccine PF 2015, 10/02/2017, 10/05/2018  Pneumococcal Conjugate (PCV-13) 2018  ZZZ-RETIRED (DO NOT USE) Pneumococcal Vaccine (Unspecified Type) 2010  Zoster 10/12/2012 FAMILY HISTORY Family History Problem Relation Age of Onset  Hypertension Mother 22 [facility] Arthritis-osteo Mother B TKR [address] [facility] Arthritis-rheumatoid Mother   
     juvenile  Breast Cancer Mother 80  
 Other Mother Diverticulitis  Hypertension Father  Diabetes Father   
      age 80 kidney failure; dialysis pt  Diabetes Sister  Cancer Brother   
      age 46 \"bone cancer\"  Alcohol abuse Brother   
      of cirrhosis age 59  
 Heart Disease Maternal Grandmother   
      at 80  Heart Disease Paternal Grandmother   
      at 80  
 Heart Attack Paternal Grandfather   
      in his 66's  Anesth Problems Neg Hx Anna Mas Visit Vitals /68 (BP 1 Location: Left arm, BP Patient Position: Sitting) Pulse 76 Temp 98.1 °F (36.7 °C) (Oral) Resp 18 Ht 5' 5\" (1.651 m) Wt 227 lb 12.8 oz (103.3 kg) LMP 2000 SpO2 94% BMI 37.91 kg/m² PHYSICAL EXAMINATION  
  Physical Exam  
Constitutional: She is well-developed, well-nourished, and in no distress. Neck: Carotid bruit is not present. Cardiovascular: Normal rate, regular rhythm and normal heart sounds. Pulmonary/Chest: Effort normal and breath sounds normal.  
Abdominal: Soft. There is no tenderness. Vitals reviewed. 
 
 
  
 
 
 ASSESSMENT & PLAN  
 
  ICD-10-CM ICD-9-CM 1. Diabetes mellitus type 2, noninsulin dependent (HCC) E90.5 424.71 METABOLIC PANEL, COMPREHENSIVE  
   HEMOGLOBIN A1C WITH EAG 2. Hypercholesterolemia E78.00 272.0 LIPID PANEL 3. Benign essential hypertension I10 401.1 4. CKD (chronic kidney disease), stage III ([facility]) J52.8 872.7 METABOLIC PANEL, COMPREHENSIVE Fasting labs today Cardiovascular risk and specific lipid/LDL/HgBA1c/BP goals reviewed Reviewed medications and side effects, doing well on current regimen Reviewed diet, nutrition, exercise, weight management, BMI/goals, age/risk based screening recommendations, health maintenance & prevention counseling. Further follow up & other recommendations pending review of labs. If all remains good and stable, follow up in  for annual wellness visit and fasting follow up.  RTC sooner prn

## 2018-11-05 NOTE — LETTER
11/13/2018 Mrs. Mely Sloan [address] [place] [id number] Dear Mely Sloan: 
 
Please find your most recent results below. Resulted Orders LIPID PANEL Result Value Ref Range Cholesterol, total 185 100 - 199 mg/dL Triglyceride 137 0 - 149 mg/dL HDL Cholesterol 53 >39 mg/dL VLDL, calculated 27 5 - 40 mg/dL LDL, calculated 105 (H) 0 - 99 mg/dL METABOLIC PANEL, COMPREHENSIVE Result Value Ref Range Glucose 117 (H) 65 - 99 mg/dL BUN 17 8 - 27 mg/dL Creatinine 1.17 (H) 0.57 - 1.00 mg/dL GFR est non-AA 46 (L) >59 mL/min/1.73 GFR est AA 53 (L) >59 mL/min/1.73  
 BUN/Creatinine ratio 15 12 - 28 Sodium 143 134 - 144 mmol/L Potassium 4.6 3.5 - 5.2 mmol/L Chloride 102 96 - 106 mmol/L  
 CO2 23 20 - 29 mmol/L Calcium 9.9 8.7 - 10.3 mg/dL Protein, total 7.2 6.0 - 8.5 g/dL Albumin 4.4 3.5 - 4.8 g/dL GLOBULIN, TOTAL 2.8 1.5 - 4.5 g/dL A-G Ratio 1.6 1.2 - 2.2 Bilirubin, total 0.4 0.0 - 1.2 mg/dL Alk. phosphatase 120 (H) 39 - 117 IU/L  
 AST (SGOT) 20 0 - 40 IU/L  
 ALT (SGPT) 12 0 - 32 IU/L HEMOGLOBIN A1C WITH EAG Result Value Ref Range Hemoglobin A1c 6.5 (H) 4.8 - 5.6 % Comment:  
            Prediabetes: 5.7 - 6.4 Diabetes: >6.4 Glycemic control for adults with diabetes: <7.0 Estimated average glucose 140 mg/dL Things are overall stable but the HgbA1c (3 month sugar average) has worsened from the last few checks, so I have included some tips below. RECOMMENDATIONS: 
Diabetes/Pre-Diabetes Meal Planning and Exercise: 
~Avoid sweets and sugars. ~Limit carbohydrate intake to between 30-45 grams of carbs max per meal and no more than 15 grams of carbs per snack ~Do not skip meals. ~Eat light snacks between meals that are low in fat and high in protein. 
~Increase water intake. ~Avoid taking any Nsaid's (ie/ Advil, Aleve, Motrin, Ibuprofen) ~Divide your plate by types of foods. Put non-starchy vegetables on half the plate, meat or other protein food on one-quarter of the plate, and a grain or starchy vegetable in the final quarter of the plate. Keep starches dark in color trying to void white starches in general. 
~Limit alcohol to no more than 1 standard drink per day for women and 2 for men (ie/ 12 oz beer, 5 oz wine, 1.5 oz liquor). ~Get regular moderate intensity cardio/aerobic exercise at least 150 minutes per week ie/ 30-50 minutes 3-4 x a week. ~Reference the ADA (American Diabetes Association Diet) for additional nutrition tips. ~We can offer referral to a certified diabetes educator or our nutrition services programs if needed. Please call me if you have any questions: [phone] Otherwise we will reassess things again at your next 6month follow up which you already have scheduled as detailed below. Take care and have a happy, healthy, blessed holiday season! Sincerely, Dada Rutledge MD  
 
Future Appointments Date Time Provider Grayson David 5/8/2019  8:00 AM Jesse Thomas MD [address] [place] [address]

## 2018-11-05 NOTE — PATIENT INSTRUCTIONS

## 2018-11-06 LAB
ALBUMIN SERPL-MCNC: 4.4 G/DL (ref 3.5–4.8)
ALBUMIN/GLOB SERPL: 1.6 {RATIO} (ref 1.2–2.2)
ALP SERPL-CCNC: 120 IU/L (ref 39–117)
ALT SERPL-CCNC: 12 IU/L (ref 0–32)
AST SERPL-CCNC: 20 IU/L (ref 0–40)
BILIRUB SERPL-MCNC: 0.4 MG/DL (ref 0–1.2)
BUN SERPL-MCNC: 17 MG/DL (ref 8–27)
BUN/CREAT SERPL: 15 (ref 12–28)
CALCIUM SERPL-MCNC: 9.9 MG/DL (ref 8.7–10.3)
CHLORIDE SERPL-SCNC: 102 MMOL/L (ref 96–106)
CHOLEST SERPL-MCNC: 185 MG/DL (ref 100–199)
CO2 SERPL-SCNC: 23 MMOL/L (ref 20–29)
CREAT SERPL-MCNC: 1.17 MG/DL (ref 0.57–1)
EST. AVERAGE GLUCOSE BLD GHB EST-MCNC: 140 MG/DL
GLOBULIN SER CALC-MCNC: 2.8 G/DL (ref 1.5–4.5)
GLUCOSE SERPL-MCNC: 117 MG/DL (ref 65–99)
HBA1C MFR BLD: 6.5 % (ref 4.8–5.6)
HDLC SERPL-MCNC: 53 MG/DL
INTERPRETATION, 910389: NORMAL
INTERPRETATION: NORMAL
LDLC SERPL CALC-MCNC: 105 MG/DL (ref 0–99)
PDF IMAGE, 910387: NORMAL
POTASSIUM SERPL-SCNC: 4.6 MMOL/L (ref 3.5–5.2)
PROT SERPL-MCNC: 7.2 G/DL (ref 6–8.5)
SODIUM SERPL-SCNC: 143 MMOL/L (ref 134–144)
TRIGL SERPL-MCNC: 137 MG/DL (ref 0–149)
VLDLC SERPL CALC-MCNC: 27 MG/DL (ref 5–40)

## 2018-12-20 DIAGNOSIS — I87.2 VENOUS INSUFFICIENCY OF BOTH LOWER EXTREMITIES: ICD-10-CM

## 2018-12-20 DIAGNOSIS — R60.9 DEPENDENT EDEMA: ICD-10-CM

## 2018-12-20 DIAGNOSIS — I10 BENIGN ESSENTIAL HYPERTENSION: ICD-10-CM

## 2018-12-21 NOTE — TELEPHONE ENCOUNTER
Future Appointments   Date Time Provider Grayson David   5/8/2019  8:00 AM Reese Thomas MD [facility] Justine Key

## 2018-12-22 RX ORDER — BUMETANIDE 0.5 MG/1
TABLET ORAL
Qty: 90 TAB | Refills: 3 | Status: SHIPPED | OUTPATIENT
Start: 2018-12-22 | End: 2019-10-22 | Stop reason: SDUPTHER

## 2018-12-22 RX ORDER — METOPROLOL SUCCINATE 25 MG/1
TABLET, EXTENDED RELEASE ORAL
Qty: 90 TAB | Refills: 3 | Status: SHIPPED | OUTPATIENT
Start: 2018-12-22 | End: 2019-10-22 | Stop reason: SDUPTHER

## 2018-12-22 RX ORDER — AMLODIPINE BESYLATE 2.5 MG/1
TABLET ORAL
Qty: 90 TAB | Refills: 3 | Status: SHIPPED | OUTPATIENT
Start: 2018-12-22 | End: 2019-10-22 | Stop reason: SDUPTHER

## 2018-12-25 NOTE — PATIENT INSTRUCTIONS
Pneumococcal Conjugate Vaccine (PCV13): What You Need to Know  Why get vaccinated? Vaccination can protect both children and adults from pneumococcal disease. Pneumococcal disease is caused by bacteria that can spread from person to person through close contact. It can cause ear infections, and it can also lead to more serious infections of the:  · Lungs (pneumonia). · Blood (bacteremia). · Covering of the brain and spinal cord (meningitis). Pneumococcal pneumonia is most common among adults. Pneumococcal meningitis can cause deafness and brain damage, and it kills about 1 child in 10 who get it. Anyone can get pneumococcal disease, but children under 3years of age and adults 72 years and older, people with certain medical conditions, and cigarette smokers are at the highest risk. Before there was a vaccine, the [facility] saw the following in children under 5 each year from pneumococcal disease:  · More than 700 cases of meningitis  · About 13,000 blood infections  · About 5 million ear infections  · About 200 deaths  Since the vaccine became available, severe pneumococcal disease in these children has fallen by 88%. About 18,000 older adults die of pneumococcal disease each year in the [place]. Treatment of pneumococcal infections with penicillin and other drugs is not as effective as it used to be, because some strains of the disease have become resistant to these drugs. This makes prevention of the disease through vaccination even more important. PCV13 vaccine  Pneumococcal conjugate vaccine (called PCV13) protects against 13 types of pneumococcal bacteria. PCV13 is routinely given to children at 2, 4, 6, and 1515 months of age. It is also recommended for children and adults 3to 59years of age with certain health conditions, and for all adults 72years of age and older. Your doctor can give you details.   Some people should not get this vaccine  Anyone who has ever had a life-threatening allergic reaction to a dose of this vaccine, to an earlier pneumococcal vaccine called PCV7, or to any vaccine containing diphtheria toxoid (for example, DTaP), should not get PCV13. Anyone with a severe allergy to any component of PCV13 should not get the vaccine. Tell your doctor if the person being vaccinated has any severe allergies. If the person scheduled for vaccination is not feeling well, your healthcare provider might decide to reschedule the shot on another day. Risks of a vaccine reaction  With any medicine, including vaccines, there is a chance of reactions. These are usually mild and go away on their own, but serious reactions are also possible. Problems reported following PCV13 varied by age and dose in the series. The most common problems reported among children were:  · About half became drowsy after the shot, had a temporary loss of appetite, or had redness or tenderness where the shot was given. · About 1 out of 3 had swelling where the shot was given. · About 1 out of 3 had a mild fever, and about 1 in 20 had a fever over 102.2°F.  · Up to about 8 out of 10 became fussy or irritable. Adults have reported pain, redness, and swelling where the shot was given; also mild fever, fatigue, headache, chills, or muscle pain. Shani Muller children who get PCV13 along with inactivated flu vaccine at the same time may be at increased risk for seizures caused by fever. Ask your doctor for more information. Problems that could happen after any vaccine:  · People sometimes faint after a medical procedure, including vaccination. Sitting or lying down for about 15 minutes can help prevent fainting and the injuries caused by a fall. Tell your doctor if you feel dizzy or have vision changes or ringing in the ears. · Some older children and adults get severe pain in the shoulder and have difficulty moving the arm where a shot was given. This happens very rarely.   · Any medication can cause a severe allergic reaction. Such reactions from a vaccine are very rare, estimated at about 1 in a million doses, and would happen within a few minutes to a few hours after the vaccination. As with any medicine, there is a very small chance of a vaccine causing a serious injury or death. The safety of vaccines is always being monitored. For more information, visit: www.cdc.gov/vaccinesafety. What if there is a serious reaction? What should I look for? · Look for anything that concerns you, such as signs of a severe allergic reaction, very high fever, or unusual behavior. Signs of a severe allergic reaction can include hives, swelling of the face and throat, difficulty breathing, a fast heartbeat, dizziness, and weakness, usually within a few minutes to a few hours after the vaccination. What should I do? · If you think it is a severe allergic reaction or other emergency that can't wait, call 911 or get the person to the nearest hospital. Otherwise, call your doctor. · Reactions should be reported to the Vaccine Adverse Event Reporting System (VAERS). Your doctor should file this report, or you can do it yourself through the VAERS website at www.vaers. [facility].gov, or by calling 1-773.643.5104. VAERS does not give medical advice. The National Vaccine Injury Compensation Program  The National Vaccine Injury Compensation Program (VICP) is a federal program that was created to compensate people who may have been injured by certain vaccines. Persons who believe they may have been injured by a vaccine can learn about the program and about filing a claim by calling 7-250.727.3423 or visiting the [employer] website at www.[facility].gov/vaccinecompensation. There is a time limit to file a claim for compensation. How can I learn more? · Ask your healthcare provider. He or she can give you the vaccine package insert or suggest other sources of information. · Call your local or state health department.   · Contact the [facility] Disease Control and Prevention (CDC):  ¨ Call 6-653.496.4438 (1-800-CDC-INFO) or  ¨ Visit CDC's website at www.cdc.gov/vaccines  Vaccine Information Statement  PCV13 Vaccine  11/5/2015  [address] DANNY Sandhu [id number]  Department of Health and Human Services  Centers for Disease Control and Prevention  Many Vaccine Information Statements are available in Pashto and other languages. See www.immunize.org/vis. Muchas hojas de información sobre vacunas están disponibles en español y en otros idiomas. Visite www.immunize.org/vis. Care instructions adapted under license by your healthcare professional. If you have questions about a medical condition or this instruction, always ask your healthcare professional. [address] any warranty or liability for your use of this information. Schedule of Personalized Health Plan  (Provide Copy to Patient)  The best way to stay healthy is to live a healthy lifestyle. A healthy lifestyle includes regular exercise, eating a well-balanced diet, keeping a healthy weight and not smoking. Regular physical exams and screening tests are another important way to take care of yourself. Preventive exams provided by health care providers can find health problems early when treatment works best and can keep you from getting certain diseases or illnesses. Preventive services include exams, lab tests, screenings, shots, monitoring and information to help you take care of your own health. All people over 65 should have a pneumonia shot. Pneumonia shots are usually only needed once in a lifetime unless your doctor decides differently. All people over 65 should have a yearly flu shot. People over 65 are at medium to high risk for Hepatitis B. Three shots are needed for complete protection.   In addition to your physical exam, some screening tests are recommended:    Bone mass measurement (dexa scan) is recommended every two years  Diabetes Mellitus screening is recommended every year. Glaucoma is an eye disease caused by high pressure in the eye. An eye exam is recommended every year. Cardiovascular screening tests that check your cholesterol and other blood fat (lipid) levels are recommended every five years. Colorectal Cancer screening tests help to find pre-cancerous polyps (growths in the colon) so they can be removed before they turn into cancer. Tests ordered for screening depend on your personal and family history risk factors.     Screening for Breast Cancer is recommended yearly with a mammogram.    Screening for Cervical Cancer is recommended every two years (annually for certain risk factors, such as previous history of STD or abnormal PAP in past 7 years), with a Pelvic Exam with PAP    Here is a list of your current Health Maintenance items with a due date:  Health Maintenance   Topic Date Due    EYE EXAM RETINAL OR DILATED Q1  05/05/2017    INFLUENZA AGE 9 TO ADULT  08/01/2017    HEMOGLOBIN A1C Q6M  08/03/2017    FOOT EXAM Q1  02/03/2018    MICROALBUMIN Q1  02/03/2018    LIPID PANEL Q1  02/03/2018    MEDICARE YEARLY EXAM  05/03/2018    GLAUCOMA SCREENING Q2Y  05/05/2018    COLONOSCOPY  06/04/2022    DTaP/Tdap/Td series (2 - Td) 02/03/2027    OSTEOPOROSIS SCREENING (DEXA)  Completed    ZOSTER VACCINE AGE 60>  Completed    Pneumococcal 65+ Low/Medium Risk  Completed Ambulatory

## 2019-01-18 ENCOUNTER — OFFICE VISIT (OUTPATIENT)
Dept: FAMILY MEDICINE CLINIC | Age: 74
End: 2019-01-18

## 2019-01-18 VITALS
HEART RATE: 93 BPM | RESPIRATION RATE: 20 BRPM | DIASTOLIC BLOOD PRESSURE: 80 MMHG | HEIGHT: 65 IN | BODY MASS INDEX: 38.15 KG/M2 | SYSTOLIC BLOOD PRESSURE: 142 MMHG | OXYGEN SATURATION: 97 % | TEMPERATURE: 98.9 F | WEIGHT: 229 LBS

## 2019-01-18 DIAGNOSIS — M17.12 OSTEOARTHRITIS OF LEFT KNEE, UNSPECIFIED OSTEOARTHRITIS TYPE: Primary | ICD-10-CM

## 2019-01-18 RX ORDER — DICLOFENAC SODIUM 10 MG/G
2 GEL TOPICAL
Qty: 1 EACH | Refills: 1 | Status: SHIPPED | OUTPATIENT
Start: 2019-01-18

## 2019-01-18 NOTE — PROGRESS NOTES
[facility] Note  Subjective:      Hortensia Girard is a 74 y.o. female who presents for an acute visit with the following chief complaints. Chief Complaint   Patient presents with    Knee Pain     left knee has been hurting and swelling for over a month. pt states she wasnt able to sleep last night. taking advil and tylenol without relief. Knee Pain  Patient complains of left knee pain. Onset of the symptoms was several weeks ago. Inciting event: none known. Current symptoms include pain location: left: anterior, within, severity of pain = moderate, swelling: mild, stiffness: moderate, knee gives out and crepitus sensation. Associated symptoms: none. Aggravating symptoms: standing, walking, rising after sitting, any weight bearing. Patient's overall course: stable and gradually worsening over the past several weeks to months. Patient has had prior knee problems. History of OA, s/p right total knee replacement. Evaluation to date: none. Treatment to date: OTC analgesics: Tylenol and BC powder, iced with little relief. Current Outpatient Medications   Medication Sig Dispense Refill    diclofenac (VOLTAREN) 1 % gel Apply 2 g to affected area four (4) times daily as needed. 1 Each 1    amLODIPine (NORVASC) 2.5 mg tablet TAKE 1 TABLET EVERY DAY FOR HYPERTENSION 90 Tab 3    metoprolol succinate (TOPROL-XL) 25 mg XL tablet TAKE ONE TABLET BY MOUTH  DAILY FOR HYPERTENSION 90 Tab 3    bumetanide (BUMEX) 0.5 mg tablet TAKE 1 TABLET BY MOUTH  DAILY AS NEEDED. TAKE WITH  POTASSIUM RICH FOOD OR  BEVERAGE 90 Tab 3    folic acid/multivit-min/lutein (CENTRUM SILVER PO) Take  by mouth.  cyanocobalamin 1,000 mcg tablet Take 1,000 mcg by mouth daily.  acetaminophen (TYLENOL) 500 mg tablet Take 500 mg by mouth daily as needed for Pain.  cholecalciferol (VITAMIN D3) 1,000 unit tablet Take  by mouth daily.       DOCOSAHEXANOIC ACID/EPA (FISH OIL PO) Take 1 Cap by mouth daily.      aspirin 81 mg tablet Take 81 mg by mouth daily. Allergies   Allergen Reactions    Ace Inhibitors Other (comments)     Renal insufficiency    Hydrochlorothiazide Other (comments)     Stopped d/t renal insufficiency, followed by Nephrologist       ROS:   Complete review of systems was reviewed with pertinent information listed in HPI. Review of Systems   Constitutional: Negative for chills, diaphoresis, fever, malaise/fatigue and weight loss. HENT: Negative for congestion, ear pain, hearing loss, sinus pain, sore throat and tinnitus. Eyes: Negative for blurred vision and double vision. Respiratory: Negative for shortness of breath. Cardiovascular: Negative for chest pain, palpitations and leg swelling. Gastrointestinal: Negative for abdominal pain, blood in stool, constipation, diarrhea, heartburn, melena, nausea and vomiting. Genitourinary: Negative for dysuria, frequency, hematuria and urgency. Musculoskeletal: Positive for joint pain. Negative for myalgias. Skin: Negative for rash. Neurological: Negative for dizziness, tingling, weakness and headaches. Endo/Heme/Allergies: Negative for polydipsia. Psychiatric/Behavioral: Negative. Objective:     Visit Vitals  /80 (BP 1 Location: Left arm, BP Patient Position: Sitting)   Pulse 93   Temp 98.9 °F (37.2 °C) (Oral)   Resp 20   Ht 5' 5\" (1.651 m)   Wt 229 lb (103.9 kg)   LMP 07/25/2000   SpO2 97%   BMI 38.11 kg/m²       Vitals and Nurse Documentation reviewed. Physical Exam   Constitutional: She is oriented to person, place, and time and well-developed, well-nourished, and in no distress. HENT:   Head: Normocephalic and atraumatic. Cardiovascular: Normal rate. Pulmonary/Chest: Effort normal.   Musculoskeletal:        Left hip: She exhibits normal range of motion, normal strength and no tenderness. Left knee: She exhibits decreased range of motion and swelling.  She exhibits no effusion, no ecchymosis, no deformity, no erythema, no LCL laxity and normal patellar mobility. No tenderness found. Limited ROM secondary to pain. Crepitus noted. Minimal swelling noted. Walking with cane and limp   Neurological: She is alert and oriented to person, place, and time. Skin: Skin is warm and dry. No rash noted. No erythema. Psychiatric: Mood, memory, affect and judgment normal.   Nursing note and vitals reviewed. Assessment/Plan:     Diagnoses and all orders for this visit:    1. Osteoarthritis of left knee, unspecified osteoarthritis type: Chronic left knee pain, swelling and stiffness. Onset several months ago, acutely worsening over the past few weeks. Unable to tolerate NSAID's with CKD. Continue Tylenol Extra-strength PRN, Start topical diclofenac gel. Consider short course of tramadol PRN for severe pain. Referral to ortho for further eval and treatments. Advised follow-up with CAITLYN medellin for joint injection if wait for other is lengthy. -     diclofenac (VOLTAREN) 1 % gel; Apply 2 g to affected area four (4) times daily as needed. -     REFERRAL TO ORTHOPEDICS      Follow-up Disposition:  Return if symptoms worsen or fail to improve.     Discussed expected course/resolution/complications of diagnosis in detail with patient.    Medication risks/benefits/costs/interactions/alternatives discussed with patient.    Pt was given an after visit summary which includes diagnoses, current medications & vitals.  Pt expressed understanding with the diagnosis and plan

## 2019-01-18 NOTE — PATIENT INSTRUCTIONS
Knee Arthritis: Care Instructions  Your Care Instructions    Knee arthritis is a breakdown of the cartilage that cushions your knee joint. When the cartilage wears down, your bones rub against each other. This causes pain and stiffness. Knee arthritis tends to get worse with time. Treatment for knee arthritis involves reducing pain, making the leg muscles stronger, and staying at a healthy body weight. The treatment usually does not improve the health of the cartilage, but it can reduce pain and improve how well your knee works. You can take simple measures to protect your knee joints, ease your pain, and help you stay active. Follow-up care is a key part of your treatment and safety. Be sure to make and go to all appointments, and call your doctor if you are having problems. It's also a good idea to know your test results and keep a list of the medicines you take. How can you care for yourself at home? · Know that knee arthritis will cause more pain on some days than on others. · Stay at a healthy weight. Lose weight if you are overweight. When you stand up, the pressure on your knees from every pound of body weight is multiplied four times. So if you lose 10 pounds, you will reduce the pressure on your knees by 40 pounds. · Talk to your doctor or physical therapist about exercises that will help ease joint pain. ? Stretch to help prevent stiffness and to prevent injury before you exercise. You may enjoy gentle forms of yoga to help keep your knee joints and muscles flexible. ? Walk instead of jog.  ? Ride a bike. This makes your thigh muscles stronger and takes pressure off your knee. ? Wear well-fitting and comfortable shoes. ? Exercise in chest-deep water. This can help you exercise longer with less pain. ? Avoid exercises that include squatting or kneeling. They can put a lot of strain on your knees.   ? Talk to your doctor to make sure that the exercise you do is not making the arthritis worse.  · Do not sit for long periods of time. Try to walk once in a while to keep your knee from getting stiff. · Ask your doctor or physical therapist whether shoe inserts may reduce your arthritis pain. · If you can afford it, get new athletic shoes at least every year. This can help reduce the strain on your knees. · Use a device to help you do everyday activities. ? A cane or walking stick can help you keep your balance when you walk. Hold the cane or walking stick in the hand opposite the painful knee. ? If you feel like you may fall when you walk, try using crutches or a front-wheeled walker. These can prevent falls that could cause more damage to your knee. ? A knee brace may help keep your knee stable and prevent pain. ? You also can use other things to make life easier, such as a higher toilet seat and handrails in the bathtub or shower. · Take pain medicines exactly as directed. ? Do not wait until you are in severe pain. You will get better results if you take it sooner. ? If you are not taking a prescription pain medicine, take an over-the-counter medicine such as acetaminophen (Tylenol), ibuprofen (Advil, Motrin), or naproxen (Aleve). Read and follow all instructions on the label. ? Do not take two or more pain medicines at the same time unless the doctor told you to. Many pain medicines have acetaminophen, which is Tylenol. Too much acetaminophen (Tylenol) can be harmful. ? Tell your doctor if you take a blood thinner, have diabetes, or have allergies to shellfish. · Ask your doctor if you might benefit from a shot of steroid medicine into your knee. This may provide pain relief for several months. · Many people take the supplements glucosamine and chondroitin for osteoarthritis. Some people feel they help, but the medical research does not show that they work. Talk to your doctor before you take these supplements. When should you call for help?   Call your doctor now or seek immediate medical care if:    · You have sudden swelling, warmth, or pain in your knee.     · You have knee pain and a fever or rash.     · You have such bad pain that you cannot use your knee.    Watch closely for changes in your health, and be sure to contact your doctor if you have any problems. Where can you learn more? Go to http://yumi-raquel.info/. Enter D252 in the search box to learn more about \"Knee Arthritis: Care Instructions. \"  Current as of: Gisella 10, 2018  Content Version: 11.9  © 3535-3763 [employer]. Care instructions adapted under license by [employer] (which disclaims liability or warranty for this information). If you have questions about a medical condition or this instruction, always ask your healthcare professional. [address] any warranty or liability for your use of this information. Arthritis: Care Instructions  Your Care Instructions  Arthritis, also called osteoarthritis, is a breakdown of the cartilage that cushions your joints. When the cartilage wears down, your bones rub against each other. This causes pain and stiffness. Many people have some arthritis as they age. Arthritis most often affects the joints of the spine, hands, hips, knees, or feet. You can take simple measures to protect your joints, ease your pain, and help you stay active. Follow-up care is a key part of your treatment and safety. Be sure to make and go to all appointments, and call your doctor if you are having problems. It's also a good idea to know your test results and keep a list of the medicines you take. How can you care for yourself at home? · Stay at a healthy weight. Being overweight puts extra strain on your joints. · Talk to your doctor or physical therapist about exercises that will help ease joint pain. ? Stretch. You may enjoy gentle forms of yoga to help keep your joints and muscles flexible. ? Walk instead of jog.  Other types of exercise that are less stressful on the joints include riding a bicycle, swimming, anayeli chi, or water exercise. ? Lift weights. Strong muscles help reduce stress on your joints. Stronger thigh muscles, for example, take some of the stress off of the knees and hips. Learn the right way to lift weights so you do not make joint pain worse. · Take your medicines exactly as prescribed. Call your doctor if you think you are having a problem with your medicine. · Take pain medicines exactly as directed. ? If the doctor gave you a prescription medicine for pain, take it as prescribed. ? If you are not taking a prescription pain medicine, ask your doctor if you can take an over-the-counter medicine. · Use a cane, crutch, walker, or another device if you need help to get around. These can help rest your joints. You also can use other things to make life easier, such as a higher toilet seat and padded handles on kitchen utensils. · Do not sit in low chairs, which can make it hard to get up. · Put heat or cold on your sore joints as needed. Use whichever helps you most. You also can take turns with hot and cold packs. ? Apply heat 2 or 3 times a day for 20 to 30 minutes--using a heating pad, hot shower, or hot pack--to relieve pain and stiffness. ? Put ice or a cold pack on your sore joint for 10 to 20 minutes at a time. Put a thin cloth between the ice and your skin. When should you call for help? Call your doctor now or seek immediate medical care if:    · You have sudden swelling, warmth, or pain in any joint.     · You have joint pain and a fever or rash.     · You have such bad pain that you cannot use a joint.    Watch closely for changes in your health, and be sure to contact your doctor if:    · You have mild joint symptoms that continue even with more than 6 weeks of care at home.     · You have stomach pain or other problems with your medicine. Where can you learn more?   Go to http://yumi-raquel.info/. Enter B129 in the search box to learn more about \"Arthritis: Care Instructions. \"  Current as of: Gisella 10, 2018  Content Version: 11.9  © 7265-2035 [employer]. Care instructions adapted under license by [employer] (which disclaims liability or warranty for this information). If you have questions about a medical condition or this instruction, always ask your healthcare professional. [place] [address] any warranty or liability for your use of this information.

## 2019-01-18 NOTE — PROGRESS NOTES
Chief Complaint   Patient presents with    Knee Pain     left knee has been hurting and swelling for over a month. pt states she wasnt able to sleep last night. taking advil and tylenol without relief. \"REVIEWED RECORD IN PREPARATION FOR VISIT AND HAVE OBTAINED THE NECESSARY DOCUMENTATION\"  1. Have you been to the ER, urgent care clinic since your last visit? Hospitalized since your last visit? No    2. Have you seen or consulted any other health care providers outside of the [address] [place] [address] since your last visit? Include any pap smears or colon screening.  No

## 2019-04-23 NOTE — TELEPHONE ENCOUNTER
Refill Request    Requested Prescriptions     Pending Prescriptions Disp Refills    bumetanide (BUMEX) 0.5 mg tablet 90 Tab 1     Sig: Take 1 Tab by mouth daily as needed.  Take with potassium rich food or beverage  Indications: Edema Island Pedicle Flap With Canthal Suspension Text: The defect edges were debeveled with a #15 scalpel blade.  Given the location of the defect, shape of the defect and the proximity to free margins an island pedicle advancement flap was deemed most appropriate.  Using a sterile surgical marker, an appropriate advancement flap was drawn incorporating the defect, outlining the appropriate donor tissue and placing the expected incisions within the relaxed skin tension lines where possible. The area thus outlined was incised deep to adipose tissue with a #15 scalpel blade.  The skin margins were undermined to an appropriate distance in all directions around the primary defect and laterally outward around the island pedicle utilizing iris scissors.  There was minimal undermining beneath the pedicle flap. A suspension suture was placed in the canthal tendon to prevent tension and prevent ectropion.

## 2019-05-13 ENCOUNTER — OFFICE VISIT (OUTPATIENT)
Dept: FAMILY MEDICINE CLINIC | Age: 74
End: 2019-05-13

## 2019-05-13 VITALS
TEMPERATURE: 97.9 F | SYSTOLIC BLOOD PRESSURE: 130 MMHG | WEIGHT: 224.6 LBS | RESPIRATION RATE: 18 BRPM | HEIGHT: 65 IN | DIASTOLIC BLOOD PRESSURE: 78 MMHG | OXYGEN SATURATION: 97 % | HEART RATE: 77 BPM | BODY MASS INDEX: 37.42 KG/M2

## 2019-05-13 DIAGNOSIS — E55.9 VITAMIN D DEFICIENCY: ICD-10-CM

## 2019-05-13 DIAGNOSIS — R60.9 DEPENDENT EDEMA: ICD-10-CM

## 2019-05-13 DIAGNOSIS — I10 BENIGN ESSENTIAL HYPERTENSION: ICD-10-CM

## 2019-05-13 DIAGNOSIS — N18.30 CKD (CHRONIC KIDNEY DISEASE), STAGE III (HCC): ICD-10-CM

## 2019-05-13 DIAGNOSIS — Z00.00 MEDICARE ANNUAL WELLNESS VISIT, SUBSEQUENT: Primary | ICD-10-CM

## 2019-05-13 DIAGNOSIS — E78.00 HYPERCHOLESTEROLEMIA: ICD-10-CM

## 2019-05-13 DIAGNOSIS — E11.9 DIABETES MELLITUS TYPE 2, NONINSULIN DEPENDENT (HCC): ICD-10-CM

## 2019-05-13 DIAGNOSIS — E11.21 TYPE 2 DIABETES WITH NEPHROPATHY (HCC): ICD-10-CM

## 2019-05-13 NOTE — PATIENT INSTRUCTIONS
Medicare Wellness Visit, Female The best way to live healthy is to have a lifestyle where you eat a well-balanced diet, exercise regularly, limit alcohol use, and quit all forms of tobacco/nicotine, if applicable. Regular preventive services are another way to keep healthy. Preventive services (vaccines, screening tests, monitoring & exams) can help personalize your care plan, which helps you manage your own care. Screening tests can find health problems at the earliest stages, when they are easiest to treat. [facility] follows the current, evidence-based guidelines published by the [facility] (RUSTSTF) when recommending preventive services for our patients. Because we follow these guidelines, sometimes recommendations change over time as research supports it. (For example, mammograms used to be recommended annually. Even though Medicare will still pay for an annual mammogram, the newer guidelines recommend a mammogram every two years for women of average risk.) Of course, you and your doctor may decide to screen more often for some diseases, based on your risk and your health status. Preventive services for you include: - Medicare offers their members a free annual wellness visit, which is time for you and your primary care provider to discuss and plan for your preventive service needs. Take advantage of this benefit every year! 
-All adults over the age of 72 should receive the recommended pneumonia vaccines. Current USPSTF guidelines recommend a series of two vaccines for the best pneumonia protection.  
-All adults should have a flu vaccine yearly and a tetanus vaccine every 10 years. All adults age 61 and older should receive a shingles vaccine once in their lifetime.   
-A bone mass density test is recommended when a woman turns 65 to screen for osteoporosis. This test is only recommended one time, as a screening. Some providers will use this same test as a disease monitoring tool if you already have osteoporosis. -All adults age 38-68 who are overweight should have a diabetes screening test once every three years.  
-Other screening tests and preventive services for persons with diabetes include: an eye exam to screen for diabetic retinopathy, a kidney function test, a foot exam, and stricter control over your cholesterol.  
-Cardiovascular screening for adults with routine risk involves an electrocardiogram (ECG) at intervals determined by your doctor.  
-Colorectal cancer screenings should be done for adults age 54-65 with no increased risk factors for colorectal cancer. There are a number of acceptable methods of screening for this type of cancer. Each test has its own benefits and drawbacks. Discuss with your doctor what is most appropriate for you during your annual wellness visit. The different tests include: colonoscopy (considered the best screening method), a fecal occult blood test, a fecal DNA test, and sigmoidoscopy. -Breast cancer screenings are recommended every other year for women of normal risk, age 54-69. 
-Cervical cancer screenings for women over age 72 are only recommended with certain risk factors.  
-All adults born between [facility] should be screened once for Hepatitis C. Here is a list of your current Health Maintenance items (your personalized list of preventive services) with a due date: 
Health Maintenance Due Topic Date Due  Shingles Vaccine (1 of 2) 02/20/1993  Albumin Urine Test  05/04/2019  Hemoglobin A1C    05/05/2019  Glaucoma Screening   07/06/2019 Diabetes Foot Health: Care Instructions Your Care Instructions When you have diabetes, your feet need extra care and attention. Diabetes can damage the nerve endings and blood vessels in your feet, making you less likely to notice when your feet are injured.  Diabetes also limits your body's ability to fight infection and get blood to areas that need it. If you get a minor foot injury, it could become an ulcer or a serious infection. With good foot care, you can prevent most of these problems. Caring for your feet can be quick and easy. Most of the care can be done when you are bathing or getting ready for bed. Follow-up care is a key part of your treatment and safety. Be sure to make and go to all appointments, and call your doctor if you are having problems. It's also a good idea to know your test results and keep a list of the medicines you take. How can you care for yourself at home? · Keep your blood sugar close to normal by watching what and how much you eat, monitoring blood sugar, taking medicines if prescribed, and getting regular exercise. · Do not smoke. Smoking affects blood flow and can make foot problems worse. If you need help quitting, talk to your doctor about stop-smoking programs and medicines. These can increase your chances of quitting for good. · Eat a diet that is low in fats. High fat intake can cause fat to build up in your blood vessels and decrease blood flow. · Inspect your feet daily for blisters, cuts, cracks, or sores. If you cannot see well, use a mirror or have someone help you. · Take care of your feet: 
? Wash your feet every day. Use warm (not hot) water. Check the water temperature with your wrists or other part of your body, not your feet. ? Dry your feet well. Pat them dry. Do not rub the skin on your feet too hard. Dry well between your toes. If the skin on your feet stays moist, bacteria or a fungus can grow, which can lead to infection. ? Keep your skin soft. Use moisturizing skin cream to keep the skin on your feet soft and prevent calluses and cracks. But do not put the cream between your toes, and stop using any cream that causes a rash. ? Clean underneath your toenails carefully.  Do not use a sharp object to clean underneath your toenails. Use the blunt end of a nail file or other rounded tool. ? Trim and file your toenails straight across to prevent ingrown toenails. Use a nail clipper, not scissors. Use an emery board to smooth the edges. · Change socks daily. Socks without seams are best, because seams often rub the feet. You can find socks for people with diabetes from specialty catalogs. · Look inside your shoes every day for things like gravel or torn linings, which could cause blisters or sores. · Buy shoes that fit well: 
? Look for shoes that have plenty of space around the toes. This helps prevent bunions and blisters. ? Try on shoes while wearing the kind of socks you will usually wear with the shoes. ? Avoid plastic shoes. They may rub your feet and cause blisters. Good shoes should be made of materials that are flexible and breathable, such as leather or cloth. ? Break in new shoes slowly by wearing them for no more than an hour a day for several days. Take extra time to check your feet for red areas, blisters, or other problems after you wear new shoes. · Do not go barefoot. Do not wear sandals, and do not wear shoes with very thin soles. Thin soles are easy to puncture. They also do not protect your feet from hot pavement or cold weather. · Have your doctor check your feet during each visit. If you have a foot problem, see your doctor. Do not try to treat an early foot problem at home. Home remedies or treatments that you can buy without a prescription (such as corn removers) can be harmful. · Always get early treatment for foot problems. A minor irritation can lead to a major problem if not properly cared for early. When should you call for help? Call your doctor now or seek immediate medical care if: 
  · You have a foot sore, an ulcer or break in the skin that is not healing after 4 days, bleeding corns or calluses, or an ingrown toenail.   · You have blue or black areas, which can mean bruising or blood flow problems.  
  · You have peeling skin or tiny blisters between your toes or cracking or oozing of the skin.  
  · You have a fever for more than 24 hours and a foot sore.  
  · You have new numbness or tingling in your feet that does not go away after you move your feet or change positions.  
  · You have unexplained or unusual swelling of the foot or ankle.  
 Watch closely for changes in your health, and be sure to contact your doctor if: 
  · You cannot do proper foot care. Where can you learn more? Go to http://yumi-raquel.info/. Enter A739 in the search box to learn more about \"Diabetes Foot Health: Care Instructions. \" Current as of: July 25, 2018 Content Version: 11.9 © 2980-0738 [employer]. Care instructions adapted under license by [employer] (which disclaims liability or warranty for this information). If you have questions about a medical condition or this instruction, always ask your healthcare professional. [address] any warranty or liability for your use of this information.

## 2019-05-13 NOTE — PROGRESS NOTES
This is the Subsequent Medicare Annual Wellness Exam, performed 12 months or more after the Initial AWV or the last Subsequent AWV I have reviewed the patient's medical history in detail and updated the computerized patient record. History Past Medical History:  
Diagnosis Date  ACP (advance care planning) 5/2/2017 Initial ACP discussion 5-2017. AMD form reviewed and copy provided. NN/facilitator to discuss with patient  Arthritis  Benign essential hypertension 5/16/2016 DX 4/09  Bilateral Dependent Foot & Ankle Edema, L>R 11/11/2010  CKD (chronic kidney disease), stage III ([address] 2/28/2013 2012; Dr Duane Gayle  Diabetes mellitus type 2, noninsulin dependent ([facility] [address] 8/15/2016 5/2012, HgbA1c 6.5  Dyspepsia 5/16/2012  Hx of complete eye exam 07/06/2017  
 cataracts OU-return in one year-[address]  Hx of mammogram 03/27/2018 [facility]-No findings suspicious for malignancy-Yearly mammogram recommended  Hypercholesterolemia 11/2/2011  Lt Ankle edema, s/p surgery  Mild renal insufficiency 11/2/2011  Vitamin D deficiency 2/28/2013 1/2013; weekly rx vit D per Nephrology Past Surgical History:  
Procedure Laterality Date  CARDIAC CATHETERIZATION  1/27/2012  
 normal, Dr Torrie Tran, but given NTG just in case to use prn  COLONOSCOPY  ~2000  
 normal, f/u 10 yrs; Dr Nely Cooper  COLONOSCOPY  06/04/2012 Dr Nely Cooper, ok per pt except diverticulosis, small benign polyp; repeat 5 yrs  HX BUNIONECTOMY Right   
 ~2005  HX BUNIONECTOMY Left 12/15/2015 Dr. Melissa Parker HX COLONOSCOPY  06/23/2017 Dr Nely Cooper - follow up in 10 years  HX DILATION AND CURETTAGE  x2 SAB x 1, AUB x1  
 HX KNEE REPLACEMENT  6/2010 Right TKR; Dr Corina Zheng  HX ORTHOPAEDIC  1990's  
 left ankle surgery  HX PARTIAL HYSTERECTOMY  ~2000 AUB, benign disease  LAP,CHOLECYSTECTOMY  1999  
 gallstones  [facility] MAMMOGRAM SCREEN BILAT  annually [facility] Current Outpatient Medications Medication Sig Dispense Refill  diclofenac (VOLTAREN) 1 % gel Apply 2 g to affected area four (4) times daily as needed. 1 Each 1  
 amLODIPine (NORVASC) 2.5 mg tablet TAKE 1 TABLET EVERY DAY FOR HYPERTENSION 90 Tab 3  
 metoprolol succinate (TOPROL-XL) 25 mg XL tablet TAKE ONE TABLET BY MOUTH  DAILY FOR HYPERTENSION 90 Tab 3  
 bumetanide (BUMEX) 0.5 mg tablet TAKE 1 TABLET BY MOUTH  DAILY AS NEEDED. TAKE WITH  POTASSIUM RICH FOOD OR  BEVERAGE 90 Tab 3  
 folic acid/multivit-min/lutein (CENTRUM SILVER PO) Take  by mouth.  cyanocobalamin 1,000 mcg tablet Take 1,000 mcg by mouth daily.  acetaminophen (TYLENOL) 500 mg tablet Take 500 mg by mouth daily as needed for Pain.  cholecalciferol (VITAMIN D3) 1,000 unit tablet Take  by mouth daily.  DOCOSAHEXANOIC ACID/EPA (FISH OIL PO) Take 1 Cap by mouth daily.  aspirin 81 mg tablet Take 81 mg by mouth daily. Allergies Allergen Reactions  Ace Inhibitors Other (comments) Renal insufficiency  Hydrochlorothiazide Other (comments) Stopped d/t renal insufficiency, followed by Nephrologist  
 
Family History Problem Relation Age of Onset  Hypertension Mother [place] Arthritis-osteo Mother B TKR [place] Arthritis-rheumatoid Mother   
     juvenile  Breast Cancer Mother 80  
 Other Mother Diverticulitis  Hypertension Father  Diabetes Father   
      age 80 kidney failure; dialysis pt  Diabetes Sister  Cancer Brother   
      age 46 \"bone cancer\"  Alcohol abuse Brother   
      of cirrhosis age 59  
 Heart Disease Maternal Grandmother   
      at 80  
 Heart Disease Paternal Grandmother   
      at 80  
 Heart Attack Paternal Grandfather   
      in his 66's  Anesth Problems Neg Hx Social History Tobacco Use  Smoking status: Never Smoker  Smokeless tobacco: Never Used Substance Use Topics  Alcohol use: Yes Comment: rarely Patient Active Problem List  
Diagnosis Code   (normal spontaneous vaginal delivery) x3 O80  Miscarriage x 2 O03.9  S/P D&C (status post dilation and curettage) V17.990  S/p laparoscopic cholecystectomy gallstones Z90.49  S/p Rt bunionectomy [id number]  Lt Ankle edema, s/p surgery M25.473  Bilateral Dependent Foot & Ankle Edema, L>R R60.9  Hypercholesterolemia E78.00  
 Mild renal insufficiency N28.9  Chest pain, unspecified R07.9  Dyspepsia & Heartburn R10.13  
 Impaired fasting glucose R73.01  
 CKD (chronic kidney disease), stage III (HCC) N18.3  Vitamin D deficiency E55.9  Benign essential hypertension I10  
 Diabetes mellitus type 2, noninsulin dependent ([facility] [address] E11.9  ACP (advance care planning) Z71.89  Severe obesity (BMI 35.0-39. 9) with comorbidity ([facility] [address] E66.01 Depression Risk Factor Screening:  
 
3 most recent PHQ Screens 2019 PHQ Not Done - Little interest or pleasure in doing things Not at all Feeling down, depressed, irritable, or hopeless Not at all Total Score PHQ 2 0 Alcohol Risk Factor Screening: You do not drink alcohol or very rarely. Functional Ability and Level of Safety:  
Hearing Loss Hearing is good. Activities of Daily Living The home contains: no safety equipment. Patient does total self care Fall Risk Fall Risk Assessment, last 12 mths 2019 Able to walk? Yes Fall in past 12 months? No  
 
 
Abuse Screen Patient is not abused Cognitive Screening Evaluation of Cognitive Function: 
Has your family/caregiver stated any concerns about your memory: no 
 
 
Patient Care Team  
Patient Care Team: 
Rogers Glaser MD as PCP - General 
Volodymyr Escobar MD (Nephrology) Assessment/Plan Education and counseling provided: 
Are appropriate based on today's review and evaluation Diagnoses and all orders for this visit: 
 
1. Medicare annual wellness visit, subsequent Health Maintenance Due Topic Date Due  Shingrix Vaccine Age 50> (1 of 2) 02/20/1993  MICROALBUMIN Q1  05/04/2019  
 HEMOGLOBIN A1C Q6M  05/05/2019  GLAUCOMA SCREENING Q2Y  07/06/2019

## 2019-05-13 NOTE — PROGRESS NOTES
Chief Complaint Patient presents with  Annual Wellness Visit  
  follow up 1. Have you been to the ER, urgent care clinic since your last visit? Hospitalized since your last visit? No 
 
2. Have you seen or consulted any other health care providers outside of the [address] [place] [address] since your last visit? Include any pap smears or colon screening.  No

## 2019-05-13 NOTE — PROGRESS NOTES
Chief Complaint Patient presents with  Diabetes  
  fasting follow up  Cholesterol Problem  Hypertension [facility] Annual Wellness Visit HISTORY OF PRESENT ILLNESS  
HPI Diabetes, Hypercholesterolemia, Hypertension, CKD 
-Diet/Exercise: not adhering to diabetic diet, no regular exercise, wt down 5 lbs which she a/t cutting back on her sodas 
-Compliant with medications: yes 
-Medication side effects: no 
-Home Glucose Monitoring Frequency: not testing, personal preference 
-Symptomatic Low BS's: no 
-Diabetes Eye Exam: up to date 
-Diabetes Foot Exam: done today 
-Interim BP's: not monitoring at home, but \"good\" at all her doctor's appointments REVIEW OF SYMPTOMS Review of Systems Constitutional: Negative. Eyes: Negative. Cardiovascular: Negative for chest pain, palpitations and leg swelling (well controlled on prn Bumex). Gastrointestinal: Negative. Genitourinary: Negative. Neurological: Negative for dizziness, tingling, sensory change and headaches. Endo/Heme/Allergies: Negative for polydipsia. PROBLEM LIST/MEDICAL HISTORY Problem List  Date Reviewed: 5/13/2019 Codes Class Noted Type 2 diabetes with nephropathy ([facility] [address] ICD-10-CM: E11.21 
ICD-9-CM: 250.40, 583.81  5/13/2019 Severe obesity (BMI 35.0-39. 9) with comorbidity ([address] ICD-10-CM: E66.01 
ICD-9-CM: 278.01  5/4/2018 ACP (advance care planning) ICD-10-CM: Z71.89 ICD-9-CM: V65.49  5/2/2017 Overview Addendum 5/4/2018 10:52 AM by Bridger Correa MD  
  Initial ACP discussion [id number]. AMD form reviewed and copy provided. NN/facilitator to discuss with patient. Discussion and info again 5-2018 Diabetes mellitus type 2, noninsulin dependent ([address] ICD-10-CM: E11.9 ICD-9-CM: 250.00  8/15/2016 Overview Signed 8/15/2016 10:36 AM by Bridger Correa MD  
  5/2012, HgbA1c 6.5 Benign essential hypertension ICD-10-CM: I10 
ICD-9-CM: 401.1  5/16/2016 Overview Signed 2016 11:03 AM by Renae Tripathi MD  
  DX  CKD (chronic kidney disease), stage III (HCC) ICD-10-CM: N18.3 ICD-9-CM: 585.3  2013 Overview Addendum 2016 10:57 AM by Renae Tripathi MD  
  ; Dr Pravin Pressley al; Renal US 2013 Vitamin D deficiency ICD-10-CM: E55.9 ICD-9-CM: 268.9  2013 Overview Signed 2013  9:25 AM by Renae Tripathi MD  
  2013; weekly rx vit D per Nephrology Dyspepsia & Heartburn ICD-10-CM: R10.13 ICD-9-CM: 536.8  2012 Overview Signed 2012  9:00 AM by Renae Tripathi MD  
  , cardiac w/u negative; improved w/ Pepcid Impaired fasting glucose ICD-10-CM: R73.01 
ICD-9-CM: 790.21  2012 Overview Signed 2012  9:07 AM by Renae Tripathi MD  
   Chest pain, unspecified ICD-10-CM: R07.9 ICD-9-CM: 786.50  2012 Overview Signed 2012  9:00 AM by Renae Tripathi MD  
  2012, Dr Phylliss Spurling; Cardiac cath negative, GI related Hypercholesterolemia ICD-10-CM: E78.00 ICD-9-CM: 272.0  2011 Mild renal insufficiency ICD-10-CM: N28.9 ICD-9-CM: 593.9  2011 Overview Signed 2013  9:22 AM by Renae Tripathi MD  
   Bilateral Dependent Foot & Ankle Edema, L>R ICD-10-CM: R60.9 ICD-9-CM: 782.3  2010  (normal spontaneous vaginal delivery) x3 ICD-10-CM: O80 
ICD-9-CM: 650  Unknown Overview Signed 5/3/2010 10:46 AM by Renae Tripathi MD  
   x4, 1 fetal demise after delivery at [facility] Miscarriage x 2 ICD-10-CM: O03.9 ICD-9-CM: 634.90  Unknown S/P D&C (status post dilation and curettage) ICD-10-CM: W34.427 ICD-9-CM: V45.89  Unknown Overview Signed 2010 12:08 PM by Renae Tripathi MD  
  X1, SAB 
x1, AUB  S/p laparoscopic cholecystectomy gallstones ICD-10-CM: Z90.49 
 ICD-9-CM: V45.89  Unknown Overview Signed 5/3/2010 10:48 AM by Anand Moreira MD  
  0491 S/p Rt bunionectomy ICD-10-CM: P10.452 ICD-9-CM: V45.89  Unknown Lt Ankle edema, s/p surgery ICD-10-CM: M25.473 ICD-9-CM: 719.07  Unknown Overview Signed 5/3/2010 10:49 AM by Anand Moreira MD  
  Early 9843'V PAST SURGICAL HISTORY Past Surgical History:  
Procedure Laterality Date  CARDIAC CATHETERIZATION  1/27/2012  
 normal, Dr Stephanie Ortega, but given NTG just in case to use prn  COLONOSCOPY  ~2000  
 normal, f/u 10 yrs; Dr Darlene Desir  COLONOSCOPY  06/04/2012 Dr Darlene Desir, ok per pt except diverticulosis, small benign polyp; repeat 5 yrs  HX BUNIONECTOMY Right   
 ~2005  HX BUNIONECTOMY Left 12/15/2015 Dr. David Umanzor HX COLONOSCOPY  06/23/2017 Dr Darlene Desir - follow up in 10 years  HX DILATION AND CURETTAGE  x2 SAB x 1, AUB x1  
 HX KNEE REPLACEMENT  6/2010 Right TKR; Dr Gabby Prince  HX ORTHOPAEDIC  1990's  
 left ankle surgery  HX PARTIAL HYSTERECTOMY  ~2000 AUB, benign disease  LAP,CHOLECYSTECTOMY  1999  
 gallstones  [facility] MAMMOGRAM SCREEN BILAT  annually [facility] MEDICATIONS Current Outpatient Medications Medication Sig  
 diclofenac (VOLTAREN) 1 % gel Apply 2 g to affected area four (4) times daily as needed.  amLODIPine (NORVASC) 2.5 mg tablet TAKE 1 TABLET EVERY DAY FOR HYPERTENSION  metoprolol succinate (TOPROL-XL) 25 mg XL tablet TAKE ONE TABLET BY MOUTH  DAILY FOR HYPERTENSION  
 bumetanide (BUMEX) 0.5 mg tablet TAKE 1 TABLET BY MOUTH  DAILY AS NEEDED. TAKE WITH  POTASSIUM RICH FOOD OR  BEVERAGE  folic acid/multivit-min/lutein (CENTRUM SILVER PO) Take  by mouth.  cyanocobalamin 1,000 mcg tablet Take 1,000 mcg by mouth daily.  acetaminophen (TYLENOL) 500 mg tablet Take 500 mg by mouth daily as needed for Pain.  cholecalciferol (VITAMIN D3) 1,000 unit tablet Take  by mouth daily.  DOCOSAHEXANOIC ACID/EPA (FISH OIL PO) Take 1 Cap by mouth daily.  aspirin 81 mg tablet Take 81 mg by mouth daily. No current facility-administered medications for this visit. ALLERGIES Allergies Allergen Reactions  Ace Inhibitors Other (comments) Renal insufficiency  Hydrochlorothiazide Other (comments) Stopped d/t renal insufficiency, followed by Nephrologist  
 
 
 
 SOCIAL HISTORY Social History Socioeconomic History  Marital status:  Spouse name: Not on file  Number of children: 3  
 Years of education: Not on file  Highest education level: Not on file Occupational History  Occupation: Retired [address] [place] [address] [address] Tobacco Use  Smoking status: Never Smoker  Smokeless tobacco: Never Used Substance and Sexual Activity  Alcohol use: Yes Comment: rarely  Drug use: No  
 Sexual activity: Yes  
  Partners: Male Other Topics Concern   Service No  
 Blood Transfusions No  
 Caffeine Concern No  
  Comment: 1 bottle of 16 oz of Pepsi a day; this is down considerably for her  Occupational Exposure No  
 Hobby Hazards No  
 Sleep Concern No  
 Stress Concern No  
 Special Diet No  
 Back Care No  
 Exercise No  
 Bike Helmet Yes [address] [facility] Yes  Self-Exams Yes Social History Narrative Lives at home w/ her , son and grand daughter (baby), 2 story home IMMUNIZATIONS Immunization History Administered Date(s) Administered  Influenza High Dose Vaccine PF 12/07/2015, 10/02/2017, 10/05/2018  Pneumococcal Conjugate (PCV-13) 01/01/2018  Pneumococcal Polysaccharide (PPSV-23) 01/01/2010  Zoster 10/12/2012 FAMILY HISTORY Family History Problem Relation Age of Onset  Hypertension Mother [facility] Arthritis-osteo Mother B TKR [facility] Arthritis-rheumatoid Mother   
     juvenile  Breast Cancer Mother 80  
 Other Mother Diverticulitis  Hypertension Father  Diabetes Father   
      age 80 kidney failure; dialysis pt  Diabetes Sister  Cancer Brother   
      age 46 \"bone cancer\"  Alcohol abuse Brother   
      of cirrhosis age 59  
 Heart Disease Maternal Grandmother   
      at 80  
 Heart Disease Paternal Grandmother   
      at 80  
 Heart Attack Paternal Grandfather   
      in his 66's  Anesth Problems Neg Hx [facility] Visit Vitals /78 (BP 1 Location: Left arm, BP Patient Position: Sitting) Pulse 77 Temp 97.9 °F (36.6 °C) (Oral) Resp 18 Ht 5' 5\" (1.651 m) Wt 224 lb 9.6 oz (101.9 kg) LMP 2000 SpO2 97% BMI 37.38 kg/m² PHYSICAL EXAMINATION Physical Exam  
Constitutional: No distress. Neck: Carotid bruit is not present. Cardiovascular: Normal rate, regular rhythm, normal heart sounds and intact distal pulses. No murmur heard. Pulmonary/Chest: Effort normal and breath sounds normal. No respiratory distress. Abdominal: Soft. There is no tenderness. Musculoskeletal: She exhibits no tenderness. Mild non pitting B foot and ankle edema, skin warm/dry/intact Neurological: Gait normal.  
Diabetic foot exam: Normal monofilament testing B. Skin warm, dry, intact. DP/PT pulses strong and intact. No skin breakdown, wounds, ulcers, lesions. Toenails pedicured. Vitals reviewed. LABORATORY DATA Results for orders placed or performed in visit on 18 LIPID PANEL Result Value Ref Range Cholesterol, total 185 100 - 199 mg/dL Triglyceride 137 0 - 149 mg/dL HDL Cholesterol 53 >39 mg/dL VLDL, calculated 27 5 - 40 mg/dL LDL, calculated 105 (H) 0 - 99 mg/dL METABOLIC PANEL, COMPREHENSIVE Result Value Ref Range Glucose 117 (H) 65 - 99 mg/dL BUN 17 8 - 27 mg/dL Creatinine 1.17 (H) 0.57 - 1.00 mg/dL GFR est non-AA 46 (L) >59 mL/min/1.73 GFR est AA 53 (L) >59 mL/min/1.73  
 BUN/Creatinine ratio 15 12 - 28 Sodium 143 134 - 144 mmol/L Potassium 4.6 3.5 - 5.2 mmol/L Chloride 102 96 - 106 mmol/L  
 CO2 23 20 - 29 mmol/L Calcium 9.9 8.7 - 10.3 mg/dL Protein, total 7.2 6.0 - 8.5 g/dL Albumin 4.4 3.5 - 4.8 g/dL GLOBULIN, TOTAL 2.8 1.5 - 4.5 g/dL A-G Ratio 1.6 1.2 - 2.2 Bilirubin, total 0.4 0.0 - 1.2 mg/dL Alk. phosphatase 120 (H) 39 - 117 IU/L  
 AST (SGOT) 20 0 - 40 IU/L  
 ALT (SGPT) 12 0 - 32 IU/L HEMOGLOBIN A1C WITH EAG Result Value Ref Range Hemoglobin A1c 6.5 (H) 4.8 - 5.6 % Estimated average glucose 140 mg/dL CKD REPORT Result Value Ref Range Interpretation Note CVD REPORT Result Value Ref Range INTERPRETATION Note PDF IMAGE Not applicable Lab Results Component Value Date/Time Hemoglobin A1c 6.5 (H) 11/05/2018 01:08 PM  
 Hemoglobin A1c 6.2 (H) 05/04/2018 11:04 AM  
 Hemoglobin A1c 6.0 (H) 11/08/2017 11:13 AM  
 Glucose 117 (H) 11/05/2018 01:08 PM  
 Microalb/Creat ratio (ug/mg creat.) 19.8 05/04/2018 11:04 AM  
 LDL, calculated 105 (H) 11/05/2018 01:08 PM  
 Creatinine 1.17 (H) 11/05/2018 01:08 PM  
  
Lab Results Component Value Date/Time Cholesterol, total 185 11/05/2018 01:08 PM  
 HDL Cholesterol 53 11/05/2018 01:08 PM  
 LDL, calculated 105 (H) 11/05/2018 01:08 PM  
 Triglyceride 137 11/05/2018 01:08 PM  
 CHOL/HDL Ratio 3.6 05/03/2010 11:36 AM  
  
 
 ASSESSMENT & PLAN  
 
  ICD-10-CM ICD-9-CM 1. Medicare annual wellness visit, subsequent Z00.00 V70.0 See separate note, same encounter 2. Diabetes mellitus type 2, noninsulin dependent (HCC) E11.9 250.00 LIPID PANEL  
   METABOLIC PANEL, COMPREHENSIVE MICROALBUMIN, UR, RAND W/ MICROALB/CREAT RATIO  
   HEMOGLOBIN A1C WITH EAG  
   HM DIABETES FOOT EXAM  
3. Type 2 diabetes with nephropathy (HCC) E11.21 250.40 MICROALBUMIN, UR, RAND W/ MICROALB/CREAT RATIO 583.81 HEMOGLOBIN A1C WITH EAG 4. Benign essential hypertension I10 401.1 5. CKD (chronic kidney disease), stage III (HCC) Z72.5 908.3 METABOLIC PANEL, COMPREHENSIVE  
   CBC W/O DIFF 6. Hypercholesterolemia E78.00 272.0 LIPID PANEL  
   METABOLIC PANEL, COMPREHENSIVE  
   TSH 3RD GENERATION 7. Vitamin D deficiency E55.9 268.9 VITAMIN D, 25 HYDROXY 8. Bilateral Dependent Foot & Ankle Edema, L>R R60.9 782.3 Fasting labs today Cardiovascular risk and specific lipid/LDL/BS/HgBA1c/BP goals reviewed Reviewed medications and side effects, doing well on current regimen Reviewed diet, nutrition, exercise, weight management, BMI/goals, age/risk based screening recommendations, health maintenance & prevention counseling. Cancer screening USPTFS guidelines reviewed w/ pt today. Discussed benefits/positive/negative outcomes of screening based on age/risk stratification. Informed consent for/against screening based on pt's personal hx/risk factors. Updated in history above and health maintenance. Further follow up & other recommendations pending review of labs.  If all remains good and stable, follow up in 6 months, sooner prn

## 2019-05-14 LAB
25(OH)D3+25(OH)D2 SERPL-MCNC: 62.3 NG/ML (ref 30–100)
ALBUMIN SERPL-MCNC: 4.4 G/DL (ref 3.5–4.8)
ALBUMIN/CREAT UR: 15.8 MG/G CREAT (ref 0–30)
ALBUMIN/GLOB SERPL: 1.7 {RATIO} (ref 1.2–2.2)
ALP SERPL-CCNC: 111 IU/L (ref 39–117)
ALT SERPL-CCNC: 16 IU/L (ref 0–32)
AST SERPL-CCNC: 21 IU/L (ref 0–40)
BILIRUB SERPL-MCNC: 0.3 MG/DL (ref 0–1.2)
BUN SERPL-MCNC: 13 MG/DL (ref 8–27)
BUN/CREAT SERPL: 14 (ref 12–28)
CALCIUM SERPL-MCNC: 9.6 MG/DL (ref 8.7–10.3)
CHLORIDE SERPL-SCNC: 104 MMOL/L (ref 96–106)
CHOLEST SERPL-MCNC: 203 MG/DL (ref 100–199)
CO2 SERPL-SCNC: 25 MMOL/L (ref 20–29)
CREAT SERPL-MCNC: 0.92 MG/DL (ref 0.57–1)
CREAT UR-MCNC: 59.4 MG/DL
ERYTHROCYTE [DISTWIDTH] IN BLOOD BY AUTOMATED COUNT: 14.7 % (ref 12.3–15.4)
EST. AVERAGE GLUCOSE BLD GHB EST-MCNC: 137 MG/DL
GLOBULIN SER CALC-MCNC: 2.6 G/DL (ref 1.5–4.5)
GLUCOSE SERPL-MCNC: 92 MG/DL (ref 65–99)
HBA1C MFR BLD: 6.4 % (ref 4.8–5.6)
HCT VFR BLD AUTO: 41.4 % (ref 34–46.6)
HDLC SERPL-MCNC: 57 MG/DL
HGB BLD-MCNC: 13.5 G/DL (ref 11.1–15.9)
INTERPRETATION, 910389: NORMAL
INTERPRETATION: NORMAL
LDLC SERPL CALC-MCNC: 126 MG/DL (ref 0–99)
MCH RBC QN AUTO: 30.1 PG (ref 26.6–33)
MCHC RBC AUTO-ENTMCNC: 32.6 G/DL (ref 31.5–35.7)
MCV RBC AUTO: 92 FL (ref 79–97)
MICROALBUMIN UR-MCNC: 9.4 UG/ML
PDF IMAGE, 910387: NORMAL
PLATELET # BLD AUTO: 291 X10E3/UL (ref 150–379)
POTASSIUM SERPL-SCNC: 4.9 MMOL/L (ref 3.5–5.2)
PROT SERPL-MCNC: 7 G/DL (ref 6–8.5)
RBC # BLD AUTO: 4.49 X10E6/UL (ref 3.77–5.28)
SODIUM SERPL-SCNC: 142 MMOL/L (ref 134–144)
TRIGL SERPL-MCNC: 99 MG/DL (ref 0–149)
TSH SERPL DL<=0.005 MIU/L-ACNC: 0.88 UIU/ML (ref 0.45–4.5)
VLDLC SERPL CALC-MCNC: 20 MG/DL (ref 5–40)
WBC # BLD AUTO: 11.4 X10E3/UL (ref 3.4–10.8)

## 2019-10-22 DIAGNOSIS — I87.2 VENOUS INSUFFICIENCY OF BOTH LOWER EXTREMITIES: ICD-10-CM

## 2019-10-22 DIAGNOSIS — I10 BENIGN ESSENTIAL HYPERTENSION: Primary | ICD-10-CM

## 2019-10-22 DIAGNOSIS — R60.9 DEPENDENT EDEMA: ICD-10-CM

## 2019-10-22 RX ORDER — BUMETANIDE 0.5 MG/1
TABLET ORAL
Qty: 90 TAB | Refills: 3 | Status: SHIPPED | OUTPATIENT
Start: 2019-10-22 | End: 2020-08-08

## 2019-10-22 RX ORDER — METOPROLOL SUCCINATE 25 MG/1
TABLET, EXTENDED RELEASE ORAL
Qty: 90 TAB | Refills: 3 | Status: SHIPPED | OUTPATIENT
Start: 2019-10-22 | End: 2020-08-08

## 2019-10-22 RX ORDER — AMLODIPINE BESYLATE 2.5 MG/1
TABLET ORAL
Qty: 90 TAB | Refills: 3 | Status: SHIPPED | OUTPATIENT
Start: 2019-10-22 | End: 2020-08-08

## 2019-11-05 ENCOUNTER — OFFICE VISIT (OUTPATIENT)
Dept: FAMILY MEDICINE CLINIC | Age: 74
End: 2019-11-05

## 2019-11-05 VITALS
HEART RATE: 74 BPM | WEIGHT: 224 LBS | DIASTOLIC BLOOD PRESSURE: 82 MMHG | HEIGHT: 65 IN | BODY MASS INDEX: 37.32 KG/M2 | RESPIRATION RATE: 16 BRPM | TEMPERATURE: 97.7 F | SYSTOLIC BLOOD PRESSURE: 120 MMHG | OXYGEN SATURATION: 97 %

## 2019-11-05 DIAGNOSIS — E11.21 TYPE 2 DIABETES WITH NEPHROPATHY (HCC): Primary | ICD-10-CM

## 2019-11-05 DIAGNOSIS — E78.00 HYPERCHOLESTEROLEMIA: ICD-10-CM

## 2019-11-05 DIAGNOSIS — R60.9 DEPENDENT EDEMA: ICD-10-CM

## 2019-11-05 DIAGNOSIS — N18.30 CKD (CHRONIC KIDNEY DISEASE), STAGE III (HCC): ICD-10-CM

## 2019-11-05 DIAGNOSIS — I10 BENIGN ESSENTIAL HYPERTENSION: ICD-10-CM

## 2019-11-05 RX ORDER — MULTIVITAMIN
TABLET,CHEWABLE ORAL
COMMUNITY
Start: 2019-09-23 | End: 2019-11-05

## 2019-11-05 RX ORDER — HYDROCODONE BITARTRATE AND ACETAMINOPHEN 5; 325 MG/1; MG/1
TABLET ORAL
Refills: 0 | COMMUNITY
Start: 2019-08-01 | End: 2019-11-05

## 2019-11-05 RX ORDER — IBUPROFEN 800 MG/1
800 TABLET ORAL
COMMUNITY
End: 2019-11-05

## 2019-11-05 RX ORDER — SULFAMETHOXAZOLE AND TRIMETHOPRIM 800; 160 MG/1; MG/1
TABLET ORAL
Refills: 0 | COMMUNITY
Start: 2019-08-01 | End: 2019-11-05 | Stop reason: ALTCHOICE

## 2019-11-05 RX ORDER — AMOXICILLIN AND CLAVULANATE POTASSIUM 875; 125 MG/1; MG/1
TABLET, FILM COATED ORAL
COMMUNITY
Start: 2019-07-29 | End: 2019-11-05 | Stop reason: ALTCHOICE

## 2019-11-05 RX ORDER — METHYLPREDNISOLONE 4 MG/1
TABLET ORAL
Refills: 0 | COMMUNITY
Start: 2019-08-01 | End: 2019-11-05 | Stop reason: ALTCHOICE

## 2019-11-05 NOTE — PATIENT INSTRUCTIONS

## 2019-11-05 NOTE — PROGRESS NOTES
Chief Complaint   Patient presents with    Diabetes     fasting 6 month follow up    Hypertension     follow up     1. Have you been to the ER, urgent care clinic since your last visit? Hospitalized since your last visit? No    2. Have you seen or consulted any other health care providers outside of the [address] [place] [address] since your last visit? Include any pap smears or colon screening.  No

## 2019-11-05 NOTE — PROGRESS NOTES
Chief Complaint   Patient presents with    Diabetes, Cholesterol     fasting 6 month follow up    Hypertension     follow up       HISTORY OF PRESENT ILLNESS   HPI  6 month fasting follow up Type 2 NIDDM, Hypercholesterolemia, Hypertension, CKD. Nothing special w/ diet. No regular exercise but just staying active caring for her elderly sister and 8 yo grandson. Wt unchanged. Does not check BS's. Does not monitor BP's. Complying w/ meds, tolerating w/o reaction or side effects. Takes Bumex a few x a week prn which works well for dependent edema. No complaints at this time. Takes Tylenol XS 1-2 x a day prn arthritis pain. REVIEW OF SYMPTOMS   Review of Systems   Constitutional: Negative. Eyes: Negative. Reports being up to date on routine eye exam ~ 1-2019   Respiratory: Negative. Cardiovascular: Negative. Gastrointestinal: Negative. Genitourinary: Negative. Neurological: Negative. PROBLEM LIST/MEDICAL HISTORY     Problem List  Date Reviewed: 11/5/2019          Codes Class Noted    Type 2 diabetes with nephropathy ([facility] ICD-10-CM: E11.21  ICD-9-CM: 250.40, 583.81  5/13/2019        Severe obesity (BMI 35.0-39. 9) with comorbidity [facility]) ICD-10-CM: E66.01  ICD-9-CM: 278.01  5/4/2018        ACP (advance care planning) ICD-10-CM: Z71.89  ICD-9-CM: V65.49  5/2/2017    Overview Addendum 5/4/2018 10:52 AM by Juan A Salazar MD     Initial ACP discussion [phone]. AMD form reviewed and copy provided. NN/facilitator to discuss with patient.  Discussion and info again 5-2018               Diabetes mellitus type 2, noninsulin dependent ([facility] ICD-10-CM: E11.9  ICD-9-CM: 250.00  8/15/2016    Overview Signed 8/15/2016 10:36 AM by Juan A Salazar MD     5/2012, HgbA1c 6.5             Benign essential hypertension ICD-10-CM: I10  ICD-9-CM: 401.1  5/16/2016    Overview Signed 5/16/2016 11:03 AM by Juan A Salazar MD     DX 4/09             CKD (chronic kidney disease), stage III ([facility] ICD-10-CM: N18.3  ICD-9-CM: 585.3  2013    Overview Addendum 2016 10:57 AM by Mk Paul MD     ; Dr Torey Buchanan al; Renal US 2013             Vitamin D deficiency ICD-10-CM: E55.9  ICD-9-CM: 268.9  2013    Overview Signed 2013  9:25 AM by Mk Paul MD     2013; weekly rx vit D per Nephrology             Dyspepsia & Heartburn ICD-10-CM: R10.13  ICD-9-CM: 536.8  2012    Overview Signed 2012  9:00 AM by Mk Paul MD     , cardiac w/u negative; improved w/ Pepcid             Impaired fasting glucose ICD-10-CM: R73.01  ICD-9-CM: 790.21  2012    Overview Signed 2012  9:07 AM by Mk Paul MD                  Chest pain, unspecified ICD-10-CM: R07.9  ICD-9-CM: 786.50  2012    Overview Signed 2012  9:00 AM by Mk Paul MD     2012, Dr Marlee Haskins; Cardiac cath negative, GI related             Hypercholesterolemia ICD-10-CM: E78.00  ICD-9-CM: 272.0  2011        Mild renal insufficiency ICD-10-CM: N28.9  ICD-9-CM: 593.9  2011    Overview Signed 2013  9:22 AM by kM Paul MD                  Bilateral Dependent Foot & Ankle Edema, L>R ICD-10-CM: R60.9  ICD-9-CM: 782.3  2010         (normal spontaneous vaginal delivery) x3 ICD-10-CM: O80  ICD-9-CM: 650  Unknown    Overview Signed 5/3/2010 10:46 AM by Mk Paul MD      x4, 1 fetal demise after delivery at 11mos             Miscarriage x 2 ICD-10-CM: O03.9  ICD-9-CM: 634.90  Unknown        S/P D&C (status post dilation and curettage) ICD-10-CM: F17.557  ICD-9-CM: V45.89  Unknown    Overview Signed 2010 12:08 PM by Mk Paul MD     X1, SAB  x1, AUB             S/p laparoscopic cholecystectomy gallstones ICD-10-CM: Z90.49  ICD-9-CM: V45.89  Unknown    Overview Signed 5/3/2010 10:48 AM by Mk Paul MD                  S/p Rt bunionectomy ICD-10-CM: J63.805  ICD-9-CM: V45.89  Unknown        Lt Ankle edema, s/p surgery ICD-10-CM: M25.473  ICD-9-CM: 719.07  Unknown    Overview Signed 5/3/2010 10:49 AM by Alfonso Mcguire MD     Early 6888'L                       PAST SURGICAL HISTORY     Past Surgical History:   Procedure Laterality Date    CARDIAC CATHETERIZATION  1/27/2012    normal, Dr Dee Dee Romero, but given NTG just in case to use prn    COLONOSCOPY  ~2000    normal, f/u 10 yrs; Dr Claudeen Simmonds  06/04/2012    Dr Sahil Lynch, ok per pt except diverticulosis, small benign polyp; repeat 5 yrs    HX BUNIONECTOMY Right     ~2005    HX BUNIONECTOMY Left 12/15/2015    Dr. Luciano Zamarripa HX COLONOSCOPY  06/23/2017    Dr Sahil Lynch - follow up in 10 years    HX [address]  x2    SAB x 1, AUB x1    HX KNEE REPLACEMENT  6/2010    Right TKR; Dr Smith Awe  8199'L    left ankle surgery    HX PARTIAL HYSTERECTOMY  ~2000    AUB, benign disease    LAP,CHOLECYSTECTOMY  1999    gallstones    JARRELL MAMMOGRAM SCREEN BILAT  annually    [facility]         MEDICATIONS     Current Outpatient Medications   Medication Sig    metoprolol succinate (TOPROL-XL) 25 mg XL tablet TAKE ONE TABLET BY MOUTH  DAILY FOR HYPERTENSION    amLODIPine (NORVASC) 2.5 mg tablet TAKE 1 TABLET EVERY DAY FOR HYPERTENSION    bumetanide (BUMEX) 0.5 mg tablet TAKE 1 TABLET BY MOUTH  DAILY AS NEEDED. TAKE WITH  POTASSIUM RICH FOOD OR  BEVERAGE    diclofenac (VOLTAREN) 1 % gel Apply 2 g to affected area four (4) times daily as needed.  folic acid/multivit-min/lutein (CENTRUM SILVER PO) Take  by mouth.  cyanocobalamin 1,000 mcg tablet Take 1,000 mcg by mouth daily.  acetaminophen (TYLENOL) 500 mg tablet Take 500 mg by mouth daily as needed for Pain.  cholecalciferol (VITAMIN D3) 1,000 unit tablet Take  by mouth daily.  DOCOSAHEXANOIC ACID/EPA (FISH OIL PO) Take 1 Cap by mouth daily.     aspirin 81 mg tablet Take 81 mg by mouth daily. No current facility-administered medications for this visit. 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   Tobacco Use    Smoking status: Never Smoker    Smokeless tobacco: Never Used   Substance and Sexual Activity    Alcohol use: Yes     Comment: rarely    Drug use: No    Sexual activity: Yes     Partners: Male   Other Topics Concern     Service No    Blood Transfusions No    Caffeine Concern No     Comment: 1 bottle of 16 oz of Pepsi a day; this is down considerably for her    Occupational Exposure No    Hobby Hazards No    Sleep Concern No    Stress Concern No    Special Diet No    Back Care No    Exercise No    Bike Helmet Yes    Seat Belt Yes    Self-Exams Yes   Social History Narrative    Updated :    Lives at home w/ her  in their 2 story home. Moved her elderly sister in w/ them.     Taking care of 8 yo grandson whose mother          IMMUNIZATIONS  Immunization History   Administered Date(s) Administered    Influenza High Dose Vaccine PF 2015, 10/02/2017, 10/05/2018    Pneumococcal Conjugate (PCV-13) 2018    Pneumococcal Polysaccharide (PPSV-23) 2010    Zoster 10/12/2012         FAMILY HISTORY     Family History   Problem Relation Age of Onset    Hypertension Mother     Arthritis-osteo Mother         B TKR   [place] Arthritis-rheumatoid Mother         juvenile    Breast Cancer Mother 80    Other Mother         Diverticulitis    Hypertension Father    [facility] Diabetes Father          age 80 kidney failure; dialysis pt    Diabetes Sister     Cancer Brother          age 46 \"bone cancer\"    Alcohol abuse Brother          of cirrhosis age 58    Heart Disease Maternal Grandmother          at 80    Heart Disease Paternal Grandmother          at 80    Heart Attack Paternal Grandfather          in his 66's   [facility] Anesth Problems Neg Hx          VITALS     Visit Vitals  /82 (BP 1 Location: Right arm, BP Patient Position: Sitting)   Pulse 74   Temp 97.7 °F (36.5 °C) (Oral)   Resp 16   Ht 5' 5\" (1.651 m)   Wt 224 lb (101.6 kg)   LMP 2000   SpO2 97%   BMI 37.28 kg/m²          PHYSICAL EXAMINATION   Physical Exam   Constitutional: She is well-developed, well-nourished, and in no distress. Neck: Neck supple. No JVD present. Carotid bruit is not present. No thyromegaly present. Cardiovascular: Normal rate, regular rhythm and normal heart sounds. Pulmonary/Chest: Effort normal and breath sounds normal.   Abdominal: Soft. There is no tenderness. Musculoskeletal:   Mild non pitting B ankle edema, skin normal   Vitals reviewed.           DIAGNOSTIC DATA         LABORATORY DATA     Results for orders placed or performed in visit on 19   LIPID PANEL   Result Value Ref Range    Cholesterol, total 203 (H) 100 - 199 mg/dL    Triglyceride 99 0 - 149 mg/dL    HDL Cholesterol 57 >39 mg/dL    VLDL, calculated 20 5 - 40 mg/dL    LDL, calculated 126 (H) 0 - 99 mg/dL   METABOLIC PANEL, COMPREHENSIVE   Result Value Ref Range    Glucose 92 65 - 99 mg/dL    BUN 13 8 - 27 mg/dL    Creatinine 0.92 0.57 - 1.00 mg/dL    GFR est non-AA 61 >59 mL/min/1.73    GFR est AA 70 >59 mL/min/1.73    BUN/Creatinine ratio 14 12 - 28    Sodium 142 134 - 144 mmol/L    Potassium 4.9 3.5 - 5.2 mmol/L    Chloride 104 96 - 106 mmol/L    CO2 25 20 - 29 mmol/L    Calcium 9.6 8.7 - 10.3 mg/dL    Protein, total 7.0 6.0 - 8.5 g/dL    Albumin 4.4 3.5 - 4.8 g/dL    GLOBULIN, TOTAL 2.6 1.5 - 4.5 g/dL    A-G Ratio 1.7 1.2 - 2.2    Bilirubin, total 0.3 0.0 - 1.2 mg/dL    Alk. phosphatase 111 39 - 117 IU/L    AST (SGOT) 21 0 - 40 IU/L    ALT (SGPT) 16 0 - 32 IU/L   MICROALBUMIN, UR, RAND W/ MICROALB/CREAT RATIO   Result Value Ref Range    Creatinine, urine 59.4 Not Estab. mg/dL    Microalbumin, urine 9.4 Not Estab. ug/mL    Microalb/Creat ratio (ug/mg creat.) 15.8 0.0 - 30.0 mg/g creat   CBC W/O DIFF   Result Value Ref Range    WBC 11.4 (H) 3.4 - 10.8 x10E3/uL    RBC 4.49 3.77 - 5.28 x10E6/uL    HGB 13.5 11.1 - 15.9 g/dL    HCT 41.4 34.0 - 46.6 %    MCV 92 79 - 97 fL    MCH 30.1 26.6 - 33.0 pg    MCHC 32.6 31.5 - 35.7 g/dL    RDW 14.7 12.3 - 15.4 %    PLATELET 133 933 - 433 x10E3/uL   HEMOGLOBIN A1C WITH EAG   Result Value Ref Range    Hemoglobin A1c 6.4 (H) 4.8 - 5.6 %    Estimated average glucose 137 mg/dL   TSH 3RD GENERATION   Result Value Ref Range    TSH 0.877 0.450 - 4.500 uIU/mL   VITAMIN D, 25 HYDROXY   Result Value Ref Range    VITAMIN D, 25-HYDROXY 62.3 30.0 - 100.0 ng/mL   CKD REPORT   Result Value Ref Range    Interpretation Note    CVD REPORT   Result Value Ref Range    INTERPRETATION Note     PDF IMAGE Not applicable        Lab Results   Component Value Date/Time    Hemoglobin A1c 6.4 (H) 05/13/2019 03:20 PM    Hemoglobin A1c 6.5 (H) 11/05/2018 01:08 PM    Hemoglobin A1c 6.2 (H) 05/04/2018 11:04 AM    Glucose 92 05/13/2019 03:20 PM    Microalb/Creat ratio (ug/mg creat.) 15.8 05/13/2019 03:20 PM    LDL, calculated 126 (H) 05/13/2019 03:20 PM    Creatinine 0.92 05/13/2019 03:20 PM          ASSESSMENT & PLAN       ICD-10-CM ICD-9-CM    1. Type 2 diabetes with nephropathy (HCC) F58.37 887.93 METABOLIC PANEL, BASIC     583.81 HEMOGLOBIN A1C WITH EAG       DIABETES EYE EXAM   2. CKD (chronic kidney disease), stage III ([facility]) O19.0 294.0 METABOLIC PANEL, BASIC   3. Benign essential hypertension I10 401.1    4. Hypercholesterolemia E78.00 272.0 LIPID PANEL   5. Bilateral Dependent Foot & Ankle Edema, L>R R60.9 782.3      Fasting labs today  Cardiovascular risk and specific lipid/LDL/BS/HgbA1c/BP goals reviewed  Reviewed diet, nutrition, exercise, weight management, BMI/goals, age/risk based screening recommendations, health maintenance & prevention counseling. Cancer screening USPTFS guidelines reviewed w/ pt today. Discussed benefits/positive/negative outcomes of screening based on age/risk stratification. Informed consent for/against screening based on pt's personal hx/risk factors. Updated in history above and health maintenance. Faxed eye exam report request to [facility]. Patient reports normal exam ~ 1-2019  Reviewed medications and side effects, doing well on current regimen  Further follow up & other recommendations pending review of labs.  If all remains good and stable, follow up in 6 months, sooner prn

## 2019-11-06 LAB
BUN SERPL-MCNC: 16 MG/DL (ref 8–27)
BUN/CREAT SERPL: 14 (ref 12–28)
CALCIUM SERPL-MCNC: 9.9 MG/DL (ref 8.7–10.3)
CHLORIDE SERPL-SCNC: 100 MMOL/L (ref 96–106)
CHOLEST SERPL-MCNC: 179 MG/DL (ref 100–199)
CO2 SERPL-SCNC: 26 MMOL/L (ref 20–29)
CREAT SERPL-MCNC: 1.16 MG/DL (ref 0.57–1)
EST. AVERAGE GLUCOSE BLD GHB EST-MCNC: 131 MG/DL
GLUCOSE SERPL-MCNC: 77 MG/DL (ref 65–99)
HBA1C MFR BLD: 6.2 % (ref 4.8–5.6)
HDLC SERPL-MCNC: 57 MG/DL
INTERPRETATION, 910389: NORMAL
INTERPRETATION: NORMAL
LDLC SERPL CALC-MCNC: 104 MG/DL (ref 0–99)
PDF IMAGE, 910387: NORMAL
POTASSIUM SERPL-SCNC: 4.7 MMOL/L (ref 3.5–5.2)
SODIUM SERPL-SCNC: 140 MMOL/L (ref 134–144)
TRIGL SERPL-MCNC: 92 MG/DL (ref 0–149)
VLDLC SERPL CALC-MCNC: 18 MG/DL (ref 5–40)

## 2020-08-07 DIAGNOSIS — I87.2 VENOUS INSUFFICIENCY OF BOTH LOWER EXTREMITIES: ICD-10-CM

## 2020-08-07 DIAGNOSIS — E78.00 HYPERCHOLESTEROLEMIA: ICD-10-CM

## 2020-08-07 DIAGNOSIS — E11.9 DIABETES MELLITUS TYPE 2, NONINSULIN DEPENDENT (HCC): Primary | ICD-10-CM

## 2020-08-07 DIAGNOSIS — I10 BENIGN ESSENTIAL HYPERTENSION: ICD-10-CM

## 2020-08-07 DIAGNOSIS — R60.9 DEPENDENT EDEMA: ICD-10-CM

## 2020-08-07 DIAGNOSIS — N18.30 CKD (CHRONIC KIDNEY DISEASE), STAGE III (HCC): ICD-10-CM

## 2020-08-08 RX ORDER — AMLODIPINE BESYLATE 2.5 MG/1
TABLET ORAL
Qty: 90 TAB | Refills: 0 | Status: SHIPPED | OUTPATIENT
Start: 2020-08-08 | End: 2020-10-20

## 2020-08-08 RX ORDER — BUMETANIDE 0.5 MG/1
TABLET ORAL
Qty: 90 TAB | Refills: 0 | Status: SHIPPED | OUTPATIENT
Start: 2020-08-08 | End: 2020-10-20

## 2020-08-08 RX ORDER — METOPROLOL SUCCINATE 25 MG/1
TABLET, EXTENDED RELEASE ORAL
Qty: 90 TAB | Refills: 0 | Status: SHIPPED | OUTPATIENT
Start: 2020-08-08 | End: 2020-10-20

## 2020-08-08 NOTE — TELEPHONE ENCOUNTER
Patient past due follow up and labs. I pended fasting lab orders.   Have her schedule a lab appt then an appt w/ me for about 1 week later (whichever she is more comfortable with between virtual or in office)

## 2020-08-18 ENCOUNTER — TELEPHONE (OUTPATIENT)
Dept: FAMILY MEDICINE CLINIC | Age: 75
End: 2020-08-18

## 2020-08-18 NOTE — TELEPHONE ENCOUNTER
----- Message from Alex Montoya sent at 8/17/2020 12:55 PM EDT -----  Regarding: Dr. Liao Early: [phone]  Caller's first and last name and relationship (if not the patient): pt   Best contact number(s): cell #[phone]  Whose call is being returned:  Nurse   Details to clarify the request: Pt is returning a call from nurse who called last week. Pt is also inquiring about is mammogram procedure.

## 2020-09-14 ENCOUNTER — OFFICE VISIT (OUTPATIENT)
Dept: FAMILY MEDICINE CLINIC | Age: 75
End: 2020-09-14
Payer: MEDICARE

## 2020-09-14 VITALS
BODY MASS INDEX: 36.32 KG/M2 | RESPIRATION RATE: 16 BRPM | WEIGHT: 218 LBS | HEIGHT: 65 IN | OXYGEN SATURATION: 96 % | TEMPERATURE: 96.7 F | HEART RATE: 76 BPM | DIASTOLIC BLOOD PRESSURE: 82 MMHG | SYSTOLIC BLOOD PRESSURE: 130 MMHG

## 2020-09-14 DIAGNOSIS — E11.9 DIABETES MELLITUS TYPE 2, NONINSULIN DEPENDENT (HCC): ICD-10-CM

## 2020-09-14 DIAGNOSIS — Z12.31 ENCOUNTER FOR SCREENING MAMMOGRAM FOR MALIGNANT NEOPLASM OF BREAST: ICD-10-CM

## 2020-09-14 DIAGNOSIS — R60.9 DEPENDENT EDEMA: ICD-10-CM

## 2020-09-14 DIAGNOSIS — Z00.00 MEDICARE ANNUAL WELLNESS VISIT, SUBSEQUENT: Primary | ICD-10-CM

## 2020-09-14 DIAGNOSIS — E78.00 HYPERCHOLESTEROLEMIA: ICD-10-CM

## 2020-09-14 DIAGNOSIS — N18.30 CKD (CHRONIC KIDNEY DISEASE), STAGE III (HCC): ICD-10-CM

## 2020-09-14 DIAGNOSIS — E66.01 SEVERE OBESITY (BMI 35.0-39.9) WITH COMORBIDITY (HCC): ICD-10-CM

## 2020-09-14 DIAGNOSIS — I10 BENIGN ESSENTIAL HYPERTENSION: ICD-10-CM

## 2020-09-14 DIAGNOSIS — E11.21 TYPE 2 DIABETES WITH NEPHROPATHY (HCC): ICD-10-CM

## 2020-09-14 PROBLEM — I20.8 ANGINA AT REST (HCC): Status: ACTIVE | Noted: 2020-09-14

## 2020-09-14 PROCEDURE — 1101F PT FALLS ASSESS-DOCD LE1/YR: CPT | Performed by: FAMILY MEDICINE

## 2020-09-14 PROCEDURE — G8754 DIAS BP LESS 90: HCPCS | Performed by: FAMILY MEDICINE

## 2020-09-14 PROCEDURE — G8427 DOCREV CUR MEDS BY ELIG CLIN: HCPCS | Performed by: FAMILY MEDICINE

## 2020-09-14 PROCEDURE — G8417 CALC BMI ABV UP PARAM F/U: HCPCS | Performed by: FAMILY MEDICINE

## 2020-09-14 PROCEDURE — G8752 SYS BP LESS 140: HCPCS | Performed by: FAMILY MEDICINE

## 2020-09-14 PROCEDURE — 1090F PRES/ABSN URINE INCON ASSESS: CPT | Performed by: FAMILY MEDICINE

## 2020-09-14 PROCEDURE — G8399 PT W/DXA RESULTS DOCUMENT: HCPCS | Performed by: FAMILY MEDICINE

## 2020-09-14 PROCEDURE — G8510 SCR DEP NEG, NO PLAN REQD: HCPCS | Performed by: FAMILY MEDICINE

## 2020-09-14 PROCEDURE — G8536 NO DOC ELDER MAL SCRN: HCPCS | Performed by: FAMILY MEDICINE

## 2020-09-14 PROCEDURE — G0439 PPPS, SUBSEQ VISIT: HCPCS | Performed by: FAMILY MEDICINE

## 2020-09-14 PROCEDURE — 99214 OFFICE O/P EST MOD 30 MIN: CPT | Performed by: FAMILY MEDICINE

## 2020-09-14 NOTE — PROGRESS NOTES
Chief Complaint   Patient presents with    Annual Wellness Visit    Diabetes    Hypertension       HISTORY OF PRESENT ILLNESS   HPI  Fasting follow up Type 2 NIDDM, Hypertension, CKD, Hypercholesterolemia. Nothing special w/ diet. Admits eating habits poor. No regular exercise. Wt stable. Not checking BS's or BP's at home. She has h/o chronic dependent foot/ankle edema, non compliant w/ diuretic due to inconvenience of urinary freq. Also more sedentary lately due to covid pandemic. Non compliant w/ support stockings as well, doesn't like them and too hard to get on and off.   EMS transported her from home to [facility] for CP, SOB. She was admitted for 4 days. No hospital reports/records available at the time of her visit today but she states she underwent a cardiac cath and says she had a \"mild heart attack\". She has been maintained on Isosorbide daily since then and has followed up w/ cardiologist twice since then. She is seeing Dr. Nedra Larson, last visit 7-22-20 and advised to follow up 1 yr. She has had no further angina attacks. REVIEW OF SYMPTOMS   Review of Systems   Constitutional: Negative. HENT: Negative. Eyes: Negative. Respiratory: Negative. Negative for cough and shortness of breath. Cardiovascular: Negative for chest pain and palpitations. Gastrointestinal: Negative. Genitourinary: Negative. Neurological: Negative. Negative for dizziness, tingling, sensory change and headaches. Endo/Heme/Allergies: Negative. Negative for polydipsia. Psychiatric/Behavioral: Negative.             PROBLEM LIST/MEDICAL HISTORY     Problem List  Date Reviewed: 9/14/2020          Codes Class Noted    Angina at rest [facility]) ICD-10-CM: I20.8  ICD-9-CM: 413.9  9/14/2020    Overview Addendum 9/14/2020  9:35 AM by Vandana Gibbs MD     [facility] , Cardiac Cath, reportedly mild CAD, Dr. Nedra Larson             Type 2 diabetes with nephropathy [facility]) ICD-10-CM: E11.21  ICD-9-CM: 250.40, 583.81  5/13/2019        Severe obesity (BMI 35.0-39. 9) with comorbidity [facility]) ICD-10-CM: E66.01  ICD-9-CM: 278.01  5/4/2018        ACP (advance care planning) ICD-10-CM: Z71.89  ICD-9-CM: V65.49  5/2/2017    Overview Addendum 5/4/2018 10:52 AM by Andrea Washington MD     Initial ACP discussion [phone]. AMD form reviewed and copy provided. NN/facilitator to discuss with patient.  Discussion and info again 5-2018               Diabetes mellitus type 2, noninsulin dependent ([facility] [address] ICD-10-CM: E11.9  ICD-9-CM: 250.00  8/15/2016    Overview Signed 8/15/2016 10:36 AM by Andrea Washington MD     5/2012, HgbA1c 6.5             Benign essential hypertension ICD-10-CM: I10  ICD-9-CM: 401.1  5/16/2016    Overview Signed 5/16/2016 11:03 AM by Andrea Washington MD     DX 4/09             CKD (chronic kidney disease), stage III ([facility] [address] ICD-10-CM: N18.3  ICD-9-CM: 585.3  2/28/2013    Overview Addendum 9/14/2020  9:12 AM by Andrea Washington MD     2012; Dr Matthew Gomes al; Renal US 2/2013; follow up prn             Vitamin D deficiency ICD-10-CM: E55.9  ICD-9-CM: 268.9  2/28/2013    Overview Signed 2/28/2013  9:25 AM by Andrea Washington MD     1/2013; weekly rx vit D per Nephrology             Dyspepsia & Heartburn ICD-10-CM: R10.13  ICD-9-CM: 536.8  5/16/2012    Overview Signed 5/16/2012  9:00 AM by Andrea Washington MD     2012, cardiac w/u negative; improved w/ Pepcid             Impaired fasting glucose ICD-10-CM: R73.01  ICD-9-CM: 790.21  5/16/2012    Overview Signed 5/16/2012  9:07 AM by Andrea Washington MD     2012             Chest pain, unspecified ICD-10-CM: R07.9  ICD-9-CM: 786.50  1/25/2012    Overview Signed 5/16/2012  9:00 AM by Andrea Washington MD     1/2012, Dr Keke Colvin; Cardiac cath negative, GI related             Hypercholesterolemia ICD-10-CM: E78.00  ICD-9-CM: 272.0  11/2/2011        Mild renal insufficiency ICD-10-CM: N28.9  ICD-9-CM: 593.9 2011    Overview Signed 2013  9:22 AM by Asiya Baugh MD                  Bilateral Dependent Foot & Ankle Edema, L>R ICD-10-CM: R60.9  ICD-9-CM: 782.3  2010         (normal spontaneous vaginal delivery) x3 ICD-10-CM: O80  ICD-9-CM: 650  Unknown    Overview Signed 5/3/2010 10:46 AM by Asiya Baugh MD      x4, 1 fetal demise after delivery at 7mos             Miscarriage x 2 ICD-10-CM: O03.9  ICD-9-CM: 634.90  Unknown        S/P D&C (status post dilation and curettage) ICD-10-CM: Y07.076  ICD-9-CM: V45.89  Unknown    Overview Signed 2010 12:08 PM by Asiya Baugh MD     X1, SAB  x1, AUB             S/p laparoscopic cholecystectomy gallstones ICD-10-CM: Z90.49  ICD-9-CM: V45.89  Unknown    Overview Signed 5/3/2010 10:48 AM by Asiya Baugh MD                  S/p Rt bunionectomy ICD-10-CM: Z98.890  ICD-9-CM: V45.89  Unknown        Lt Ankle edema, s/p surgery ICD-10-CM: M25.473  ICD-9-CM: 719.07  Unknown    Overview Signed 5/3/2010 10:49 AM by Asiya Baugh MD     Early 2323'Y                       PAST SURGICAL HISTORY     Past Surgical History:   Procedure Laterality Date    CARDIAC CATHETERIZATION  2012    normal, Dr Brien Nix, but given NTG just in case to use prn    HX BUNIONECTOMY Right     ~    HX BUNIONECTOMY Left 12/15/2015    Dr. Francoise Bro HX COLONOSCOPY  2017    Dr Alireza Mcghee - follow up in 10 years    HX COLONOSCOPY  2012    Dr Alireza Mcghee, ok per pt except diverticulosis, small benign polyp; repeat 5 yrs    HX COLONOSCOPY  ~    normal, f/u 10 yrs; Dr Hermann Senior  x2    SAB x 1, AUB x1    HX HEART CATHETERIZATION  2019    [facility] Dr. Elizabeth Ceron    HX KNEE REPLACEMENT  2010    Right TKR; Dr Luis Barraza    gallstones    HX ORTHOPAEDIC  s    left ankle surgery    HX PARTIAL HYSTERECTOMY  ~    AUB, benign disease    JARRELL MAMMOGRAM SCREEN BILAT  annually    [facility]         MEDICATIONS     Current Outpatient Medications   Medication Sig    TURMERIC PO Take  by mouth daily.  ISOSORBIDE DINITRATE PO Take  by mouth.  ISOSORBIDE PO Take 1 Tab by mouth daily. Per cardiology Dr. Judy Jefferson since     amLODIPine (NORVASC) 2.5 mg tablet TAKE 1 TABLET EVERY DAY FOR HYPERTENSION    metoprolol succinate (TOPROL-XL) 25 mg XL tablet TAKE ONE TABLET BY MOUTH  DAILY FOR HYPERTENSION    bumetanide (BUMEX) 0.5 mg tablet TAKE 1 TABLET BY MOUTH  DAILY AS NEEDED. TAKE WITH  POTASSIUM RICH FOOD OR  BEVERAGE    folic acid/multivit-min/lutein (CENTRUM SILVER PO) Take  by mouth daily.  cyanocobalamin 1,000 mcg tablet Take 1,000 mcg by mouth daily.  acetaminophen (TYLENOL) 500 mg tablet Take 500 mg by mouth daily as needed for Pain.  cholecalciferol (VITAMIN D3) 1,000 unit tablet Take  by mouth daily.  DOCOSAHEXANOIC ACID/EPA (FISH OIL PO) Take 1 Cap by mouth daily.  aspirin 81 mg tablet Take 81 mg by mouth daily.  diclofenac (VOLTAREN) 1 % gel Apply 2 g to affected area four (4) times daily as needed. No current facility-administered medications for this visit.            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 [employer]   Tobacco Use    Smoking status: Never Smoker    Smokeless tobacco: Never Used   Substance and Sexual Activity    Alcohol use: Yes     Comment: rarely    Drug use: No    Sexual activity: Yes     Partners: Male   Other Topics Concern     Service No    Blood Transfusions No    Caffeine Concern No     Comment: 1 bottle of 16-20 oz of Pepsi a day    Occupational Exposure No    Hobby Hazards No    Sleep Concern No    Stress Concern No    Weight Concern Yes     Comment: overweight    Special Diet No    Back Care No    Exercise No    Bike Helmet Yes    Seat Belt Yes    Self-Exams Yes   Social History Narrative    Updated :    Lives at home w/ her  in their 2 story home. Moved her elderly sister in w/ them. Taking care of 10 yo grandson whose mother          IMMUNIZATIONS  Immunization History   Administered Date(s) Administered    Influenza High Dose Vaccine PF 2015, 10/02/2017, 10/05/2018    Pneumococcal Conjugate (PCV-13) 2018    Pneumococcal Polysaccharide (PPSV-23) 2010    Tdap 2020    Zoster 10/12/2012         FAMILY HISTORY     Family History   Problem Relation Age of Onset    Hypertension Mother     Arthritis-osteo Mother         B TKR   [facility] Arthritis-rheumatoid Mother         juvenile    Breast Cancer Mother 80    Other Mother         Diverticulitis    Hypertension Father    [facility] Diabetes Father          age 80 kidney failure; dialysis pt    Diabetes Sister     Cancer Brother          age 46 \"bone cancer\"    Alcohol abuse Brother          of cirrhosis age 58    Heart Disease Maternal Grandmother          at 80   [facility] Paternal Grandmother          at 80    Heart Attack Paternal Grandfather          in his 66's   [facility] Anesth Problems Neg Hx          VITALS     Visit Vitals  /82   Pulse 76   Temp (!) 96.7 °F (35.9 °C) (Oral)   Resp 16   Ht 5' 5\" (1.651 m)   Wt 218 lb (98.9 kg)   LMP 2000   SpO2 96%   BMI 36.28 kg/m²          PHYSICAL EXAMINATION   Physical Exam  Vitals signs reviewed. Constitutional:       General: She is not in acute distress. Appearance: She is obese.    Cardiovascular:      Rate and Rhythm: Normal rate and regular rhythm. Pulses:           Dorsalis pedis pulses are 2+ on the right side and 2+ on the left side. Posterior tibial pulses are 2+ on the right side and 2+ on the left side. Heart sounds: Normal heart sounds. No murmur. No gallop. Pulmonary:      Effort: Pulmonary effort is normal. No respiratory distress. Breath sounds: Normal breath sounds. Abdominal:      Palpations: Abdomen is soft. Tenderness: There is no abdominal tenderness. Musculoskeletal:      Comments: Moderate non pitting B foot and ankle edema at her baseline. Skin warm, dry, intact. No rashes, lesions or erythema   Feet:      Right foot:      Protective Sensation: 5 sites tested. 5 sites sensed. Skin integrity: Skin integrity normal. No ulcer or skin breakdown. Toenail Condition: Right toenails are abnormally thick. Left foot:      Protective Sensation: 5 sites tested. 5 sites sensed. Skin integrity: Skin integrity normal. No ulcer or skin breakdown. Toenail Condition: Left toenails are abnormally thick. Skin:     General: Skin is warm and dry. Neurological:      General: No focal deficit present. Mental Status: She is oriented to person, place, and time. Coordination: Coordination is intact. Gait: Gait is intact.    Psychiatric:         Mood and Affect: Mood normal.             DIAGNOSTIC DATA         LABORATORY DATA     Results for orders placed or performed in visit on 11/05/19   LIPID PANEL   Result Value Ref Range    Cholesterol, total 179 100 - 199 mg/dL    Triglyceride 92 0 - 149 mg/dL    HDL Cholesterol 57 >39 mg/dL    VLDL, calculated 18 5 - 40 mg/dL    LDL, calculated 104 (H) 0 - 99 mg/dL   METABOLIC PANEL, BASIC   Result Value Ref Range    Glucose 77 65 - 99 mg/dL    BUN 16 8 - 27 mg/dL    Creatinine 1.16 (H) 0.57 - 1.00 mg/dL    GFR est non-AA 46 (L) >59 mL/min/1.73    GFR est AA 53 (L) >59 mL/min/1.73    BUN/Creatinine ratio 14 12 - 28    Sodium 140 134 - 144 mmol/L    Potassium 4.7 3.5 - 5.2 mmol/L    Chloride 100 96 - 106 mmol/L    CO2 26 20 - 29 mmol/L    Calcium 9.9 8.7 - 10.3 mg/dL   HEMOGLOBIN A1C WITH EAG   Result Value Ref Range    Hemoglobin A1c 6.2 (H) 4.8 - 5.6 %    Estimated average glucose 131 mg/dL   CKD REPORT   Result Value Ref Range    Interpretation Note    CVD REPORT   Result Value Ref Range    INTERPRETATION Note     PDF IMAGE Not applicable        Lab Results   Component Value Date/Time    Hemoglobin A1c 6.2 (H) 11/05/2019 09:12 AM    Hemoglobin A1c 6.4 (H) 05/13/2019 03:20 PM    Hemoglobin A1c 6.5 (H) 11/05/2018 01:08 PM    Glucose 77 11/05/2019 09:12 AM    Microalb/Creat ratio (ug/mg creat.) 15.8 05/13/2019 03:20 PM    LDL, calculated 104 (H) 11/05/2019 09:12 AM    Creatinine 1.16 (H) 11/05/2019 09:12 AM      Lab Results   Component Value Date/Time    Cholesterol, total 179 11/05/2019 09:12 AM    HDL Cholesterol 57 11/05/2019 09:12 AM    LDL, calculated 104 (H) 11/05/2019 09:12 AM    Triglyceride 92 11/05/2019 09:12 AM    CHOL/HDL Ratio 3.6 05/03/2010 11:36 AM         ASSESSMENT & PLAN       ICD-10-CM ICD-9-CM    1. Medicare annual wellness visit, subsequent  Z00.00 V70.0 See separate note under this same encounter visit for Medicare Wellness note. 2. Encounter for screening mammogram for malignant neoplasm of breast  Z12.31 V76.12 JARRELL 3D PETER W MAMMO BI SCREENING INCL CAD (last mammogram done ABC 4-2019)   3. Diabetes mellitus type 2, noninsulin dependent (HCC)  E11.9 250.00    4. Type 2 diabetes with nephropathy (HCC)  E11.21 250.40      583.81    5. Benign essential hypertension  I10 401.1    6. CKD (chronic kidney disease), stage III (HCC)  N18.3 585.3    7. Bilateral Dependent Foot & Ankle Edema, L>R  R60.9 782.3    8. Hypercholesterolemia  E78.00 272.0    9. Severe obesity (BMI 35.0-39. 9) [facility]  E66.01 278.01      Order for fasting labs at [facility], reprinted pre-ordered labs placed 8-2020 and given to pt today  Cardiovascular risk and specific BP/BS/Hgba1c/lipid/LDL goals reviewed  Reviewed medications and side effects in detail  Specific diabetic recommendations: foot care discussed and Podiatry visits discussed and long term diabetic complications discussed   Reviewed diet, nutrition, exercise, weight management, BMI/goals, age/risk based screening recommendations, health maintenance & prevention counseling. Cancer screening USPTFS guidelines reviewed w/ pt today. Discussed benefits/positive/negative outcomes of screening based on age/risk stratification. Informed consent for/against screening based on pt's personal hx/risk factors. Updated in history above and health maintenance. Getting her flu shot at [facility] next month. Further follow up & other recommendations pending review of labs.  If all remains good and stable, follow up in 6 months, sooner prn

## 2020-09-14 NOTE — PATIENT INSTRUCTIONS
Medicare Wellness Visit, Female The best way to live healthy is to have a lifestyle where you eat a well-balanced diet, exercise regularly, limit alcohol use, and quit all forms of tobacco/nicotine, if applicable. Regular preventive services are another way to keep healthy. Preventive services (vaccines, screening tests, monitoring & exams) can help personalize your care plan, which helps you manage your own care. Screening tests can find health problems at the earliest stages, when they are easiest to treat. [facility] follows the current, evidence-based guidelines published by the [facility] Eulogio Sheehan ([facility]) when recommending preventive services for our patients. Because we follow these guidelines, sometimes recommendations change over time as research supports it. (For example, mammograms used to be recommended annually. Even though Medicare will still pay for an annual mammogram, the newer guidelines recommend a mammogram every two years for women of average risk). Of course, you and your doctor may decide to screen more often for some diseases, based on your risk and your co-morbidities (chronic disease you are already diagnosed with). Preventive services for you include: - Medicare offers their members a free annual wellness visit, which is time for you and your primary care provider to discuss and plan for your preventive service needs. Take advantage of this benefit every year! 
-All adults over the age of 72 should receive the recommended pneumonia vaccines. Current USPSTF guidelines recommend a series of two vaccines for the best pneumonia protection.  
-All adults should have a flu vaccine yearly and a tetanus vaccine every 10 years.  
-All adults age 48 and older should receive the shingles vaccines (series of two vaccines). -All adults age 38-68 who are overweight should have a diabetes screening test once every three years. -All adults born between [birth year] and 1967 should be screened once for Hepatitis C. 
-Other screening tests and preventive services for persons with diabetes include: an eye exam to screen for diabetic retinopathy, a kidney function test, a foot exam, and stricter control over your cholesterol.  
-Cardiovascular screening for adults with routine risk involves an electrocardiogram (ECG) at intervals determined by your doctor.  
-Colorectal cancer screenings should be done for adults age 54-65 with no increased risk factors for colorectal cancer. There are a number of acceptable methods of screening for this type of cancer. Each test has its own benefits and drawbacks. Discuss with your doctor what is most appropriate for you during your annual wellness visit. The different tests include: colonoscopy (considered the best screening method), a fecal occult blood test, a fecal DNA test, and sigmoidoscopy. 
 
-A bone mass density test is recommended when a woman turns 65 to screen for osteoporosis. This test is only recommended one time, as a screening. Some providers will use this same test as a disease monitoring tool if you already have osteoporosis. -Breast cancer screenings are recommended every other year for women of normal risk, age 54-69. 
-Cervical cancer screenings for women over age 72 are only recommended with certain risk factors. Here is a list of your current Health Maintenance items (your personalized list of preventive services) with a due date: 
Health Maintenance Due Topic Date Due  Shingles Vaccine (1 of 2) 02/20/1993  Hemoglobin A1C    05/05/2020  Albumin Urine Test  05/13/2020  Yearly Flu Vaccine (1) 09/01/2020

## 2020-09-14 NOTE — PROGRESS NOTES
This is the Subsequent Medicare Annual Wellness Exam, performed 12 months or more after the Initial AWV or the last Subsequent AWV    I have reviewed the patient's medical history in detail and updated the computerized patient record. History     Patient Active Problem List   Diagnosis Code     (normal spontaneous vaginal delivery) x3 O80    Miscarriage x 2 O03.9    S/P D&C (status post dilation and curettage) Z98.890    S/p laparoscopic cholecystectomy gallstones Z90.49    S/p Rt bunionectomy Z98.890    Lt Ankle edema, s/p surgery M25.473    Bilateral Dependent Foot & Ankle Edema, L>R R60.9    Hypercholesterolemia E78.00    Mild renal insufficiency N28.9    Chest pain, unspecified R07.9    Dyspepsia & Heartburn R10.13    Impaired fasting glucose R73.01    CKD (chronic kidney disease), stage III (HCC) N18.3    Vitamin D deficiency E55.9    Benign essential hypertension I10    Diabetes mellitus type 2, noninsulin dependent (HCC) E11.9    ACP (advance care planning) Z71.89    Severe obesity (BMI 35.0-39. 9) with comorbidity ([address] E66.01    Type 2 diabetes with nephropathy ([address] E11.21    Angina at rest [facility]) I20.8     Past Medical History:   Diagnosis Date    ACP (advance care planning) 2017    Initial ACP discussion -. AMD form reviewed and copy provided.  NN/facilitator to discuss with patient     Angina at rest [facility]) 2020    [facility] , Cardiac Cath, reportedly mild CAD, Dr. Mike Wilson Arthritis     Benign essential hypertension 2016    DX     Bilateral Dependent Foot & Ankle Edema, L>R 2010    CKD (chronic kidney disease), stage III ([address] 2013; Dr Dru Toribio Diabetes mellitus type 2, noninsulin dependent ([address] 8/15/2016    2012, HgbA1c 6.5    Dyspepsia 2012    Hx of complete eye exam 2017    cataracts OU-return in one year-[address] [place] Hx of mammogram 2018    [facility]-No findings suspicious for malignancy-Yearly mammogram recommended    Hypercholesterolemia 11/2/2011    Lt Ankle edema, s/p surgery     Mild renal insufficiency 11/2/2011    Vitamin D deficiency 2/28/2013 1/2013; weekly rx vit D per Nephrology      Past Surgical History:   Procedure Laterality Date    CARDIAC CATHETERIZATION  1/27/2012    normal, Dr Maddie Han, but given NTG just in case to use prn    HX BUNIONECTOMY Right     ~2005    HX BUNIONECTOMY Left 12/15/2015    Dr. Sharonda Minaya' [facility]ades HX COLONOSCOPY  06/23/2017    Dr Angelito Ramírez - follow up in 10 years    HX COLONOSCOPY  06/04/2012    Dr Angelito Ramírez, ok per pt except diverticulosis, small benign polyp; repeat 5 yrs    HX COLONOSCOPY  ~2000    normal, f/u 10 yrs; Dr Anastasiya Chen  x2    SAB x 1, AUB x1    HX HEART CATHETERIZATION  12/2019    [facility] Dr. Nedra Larson    HX KNEE REPLACEMENT  6/2010    Right TKR; Dr Royal Jacobs    gallstones    HX ORTHOPAEDIC  1990's    left ankle surgery    HX PARTIAL HYSTERECTOMY  ~2000    AUB, benign disease    J[facility]LL MAMMOGRAM SCREEN BILAT  annually    [facility]     Current Outpatient Medications   Medication Sig Dispense Refill    TURMERIC PO Take  by mouth daily.  ISOSORBIDE DINITRATE PO Take  by mouth.  ISOSORBIDE PO Take 1 Tab by mouth daily. Per cardiology Dr. Nedra Larson since       amLODIPine (NORVASC) 2.5 mg tablet TAKE 1 TABLET EVERY DAY FOR HYPERTENSION 90 Tab 0    metoprolol succinate (TOPROL-XL) 25 mg XL tablet TAKE ONE TABLET BY MOUTH  DAILY FOR HYPERTENSION 90 Tab 0    bumetanide (BUMEX) 0.5 mg tablet TAKE 1 TABLET BY MOUTH  DAILY AS NEEDED. TAKE WITH  POTASSIUM RICH FOOD OR  BEVERAGE 90 Tab 0    folic acid/multivit-min/lutein (CENTRUM SILVER PO) Take  by mouth daily.  cyanocobalamin 1,000 mcg tablet Take 1,000 mcg by mouth daily.       acetaminophen (TYLENOL) 500 mg tablet Take 500 mg by mouth daily as needed for Pain.  cholecalciferol (VITAMIN D3) 1,000 unit tablet Take  by mouth daily.  DOCOSAHEXANOIC ACID/EPA (FISH OIL PO) Take 1 Cap by mouth daily.  aspirin 81 mg tablet Take 81 mg by mouth daily.  diclofenac (VOLTAREN) 1 % gel Apply 2 g to affected area four (4) times daily as needed. 1 Each 1     Allergies   Allergen Reactions    Ace Inhibitors Other (comments)     Renal insufficiency    Hydrochlorothiazide Other (comments)     Stopped d/t renal insufficiency, followed by Nephrologist         Family History   Problem Relation Age of Onset    Hypertension Mother    [facility] Arthritis-osteo Mother         B TKR   [address] [facility] Arthritis-rheumatoid Mother         juvenile    Breast Cancer Mother 80    Other Mother         Diverticulitis    Hypertension Father     Diabetes Father          age 80 kidney failure; dialysis pt    Diabetes Sister     Cancer Brother          age 46 \"bone cancer\"    Alcohol abuse Brother          of cirrhosis age 58    Heart Disease Maternal Grandmother          at 80    Heart Disease Paternal Grandmother          at 80    Heart Attack Paternal Grandfather          in his 66's   [facility] Anesth Problems Neg Hx      Social History     Tobacco Use    Smoking status: Never Smoker    Smokeless tobacco: Never Used   Substance Use Topics    Alcohol use: Yes     Comment: rarely       Depression Risk Factor Screening:     3 most recent PHQ Screens 2020   PHQ Not Done -   Little interest or pleasure in doing things Not at all   Feeling down, depressed, irritable, or hopeless Not at all   Total Score PHQ 2 0       Alcohol Risk Screen   Do you average more than 1 drink per night or more than 7 drinks a week:  No    On any one occasion in the past three months have you have had more than 3 drinks containing alcohol:  No        Functional Ability and Level of Safety:   Hearing: Hearing is good. Activities of Daily Living:   The home contains: no safety equipment. Patient does total self care  ADL Assessment 9/14/2020   Feeding yourself No Help Needed   Getting from bed to chair No Help Needed   Getting dressed No Help Needed   Bathing or showering No Help Needed   Walk across the room (includes cane/walker) No Help Needed   Using the telphone No Help Needed   Taking your medications No Help Needed   Preparing meals No Help Needed   Managing money (expenses/bills) No Help Needed   Moderately strenuous housework (laundry) No Help Needed   Shopping for personal items (toiletries/medicines) No Help Needed   Shopping for groceries No Help Needed   Driving No Help Needed   Climbing a flight of stairs No Help Needed   Getting to places beyond walking distances No Help Needed        Ambulation: with no difficulty     Fall Risk:  Fall Risk Assessment, last 12 mths 9/14/2020   Able to walk? Yes   Fall in past 12 months? No     Abuse Screen:  Patient is not abused       Cognitive Screening   Has your family/caregiver stated any concerns about your memory: no         Patient Care Team   Patient Care Team:  Esteban Calvert MD as PCP - General  Esteban Calvert MD as PCP - [facility] Empaneled Provider  Delaney Pendleton MD (Nephrology)    Assessment/Plan   Education and counseling provided:  Are appropriate based on today's review and evaluation  Influenza Vaccine  Screening Mammography    Diagnoses and all orders for this visit:    1. Medicare annual wellness visit, subsequent    2. Encounter for screening mammogram for malignant neoplasm of breast  -     JARRELL 3D PETER W MAMMO BI SCREENING INCL CAD;  Future        Health Maintenance Due   Topic Date Due    Shingrix Vaccine Age 50> (1 of 2) 02/20/1993    A1C test (Diabetic or Prediabetic)  05/05/2020    MICROALBUMIN Q1  05/13/2020    Flu Vaccine (1) 09/01/2020

## 2020-09-14 NOTE — PROGRESS NOTES
Chief Complaint   Patient presents with    Annual Wellness Visit    Ankle swelling       1. Have you been to the ER, urgent care clinic since your last visit? Hospitalized since your last visit? No    2. Have you seen or consulted any other health care providers outside of the [address] [place] [address] [place] since your last visit? Include any pap smears or colon screening. Yes When: July 2020 Where: Dr. Hutchinson/Cardiology Reason for visit: F/u    3 most recent PHQ Screens 9/14/2020   PHQ Not Done -   Little interest or pleasure in doing things Not at all   Feeling down, depressed, irritable, or hopeless Not at all   Total Score PHQ 2 0       Fall Risk Assessment, last 12 mths 9/14/2020   Able to walk? Yes   Fall in past 12 months? No           ADL Assessment 9/14/2020   Feeding yourself No Help Needed   Getting from bed to chair No Help Needed   Getting dressed No Help Needed   Bathing or showering No Help Needed   Walk across the room (includes cane/walker) No Help Needed   Using the telphone No Help Needed   Taking your medications No Help Needed   Preparing meals No Help Needed   Managing money (expenses/bills) No Help Needed   Moderately strenuous housework (laundry) No Help Needed   Shopping for personal items (toiletries/medicines) No Help Needed   Shopping for groceries No Help Needed   Driving No Help Needed   Climbing a flight of stairs No Help Needed   Getting to places beyond walking distances No Help Needed     Abuse Screening Questionnaire 9/14/2020   Do you ever feel afraid of your partner? N   Are you in a relationship with someone who physically or mentally threatens you? N   Is it safe for you to go home?  Nancy Rajan

## 2020-09-15 LAB
ALBUMIN SERPL-MCNC: 4.6 G/DL (ref 3.7–4.7)
ALBUMIN/CREAT UR: 19 MG/G CREAT (ref 0–29)
ALBUMIN/GLOB SERPL: 1.8 {RATIO} (ref 1.2–2.2)
ALP SERPL-CCNC: 112 IU/L (ref 39–117)
ALT SERPL-CCNC: 12 IU/L (ref 0–32)
AST SERPL-CCNC: 22 IU/L (ref 0–40)
BILIRUB SERPL-MCNC: 0.3 MG/DL (ref 0–1.2)
BUN SERPL-MCNC: 21 MG/DL (ref 8–27)
BUN/CREAT SERPL: 19 (ref 12–28)
CALCIUM SERPL-MCNC: 9.8 MG/DL (ref 8.7–10.3)
CHLORIDE SERPL-SCNC: 100 MMOL/L (ref 96–106)
CHOLEST SERPL-MCNC: 190 MG/DL (ref 100–199)
CO2 SERPL-SCNC: 28 MMOL/L (ref 20–29)
CREAT SERPL-MCNC: 1.13 MG/DL (ref 0.57–1)
CREAT UR-MCNC: 192.9 MG/DL
ERYTHROCYTE [DISTWIDTH] IN BLOOD BY AUTOMATED COUNT: 13.7 % (ref 11.7–15.4)
GLOBULIN SER CALC-MCNC: 2.5 G/DL (ref 1.5–4.5)
GLUCOSE SERPL-MCNC: 104 MG/DL (ref 65–99)
HCT VFR BLD AUTO: 41 % (ref 34–46.6)
HDLC SERPL-MCNC: 55 MG/DL
HGB BLD-MCNC: 13 G/DL (ref 11.1–15.9)
INTERPRETATION, 910389: NORMAL
INTERPRETATION: NORMAL
LDLC SERPL CALC-MCNC: 114 MG/DL (ref 0–99)
MCH RBC QN AUTO: 30.2 PG (ref 26.6–33)
MCHC RBC AUTO-ENTMCNC: 31.7 G/DL (ref 31.5–35.7)
MCV RBC AUTO: 95 FL (ref 79–97)
MICROALBUMIN UR-MCNC: 37.1 UG/ML
PDF IMAGE, 910387: NORMAL
PLATELET # BLD AUTO: 283 X10E3/UL (ref 150–450)
POTASSIUM SERPL-SCNC: 4.7 MMOL/L (ref 3.5–5.2)
PROT SERPL-MCNC: 7.1 G/DL (ref 6–8.5)
RBC # BLD AUTO: 4.31 X10E6/UL (ref 3.77–5.28)
SODIUM SERPL-SCNC: 141 MMOL/L (ref 134–144)
TRIGL SERPL-MCNC: 120 MG/DL (ref 0–149)
TSH SERPL DL<=0.005 MIU/L-ACNC: 1.54 UIU/ML (ref 0.45–4.5)
VLDLC SERPL CALC-MCNC: 21 MG/DL (ref 5–40)
WBC # BLD AUTO: 10.7 X10E3/UL (ref 3.4–10.8)

## 2020-10-20 DIAGNOSIS — I10 BENIGN ESSENTIAL HYPERTENSION: ICD-10-CM

## 2020-10-20 DIAGNOSIS — R60.9 DEPENDENT EDEMA: ICD-10-CM

## 2020-10-20 DIAGNOSIS — I87.2 VENOUS INSUFFICIENCY OF BOTH LOWER EXTREMITIES: ICD-10-CM

## 2020-10-20 RX ORDER — METOPROLOL SUCCINATE 25 MG/1
TABLET, EXTENDED RELEASE ORAL
Qty: 90 TAB | Refills: 3 | Status: SHIPPED | OUTPATIENT
Start: 2020-10-20 | End: 2021-03-24 | Stop reason: DRUGHIGH

## 2020-10-20 RX ORDER — BUMETANIDE 0.5 MG/1
TABLET ORAL
Qty: 90 TAB | Refills: 3 | Status: SHIPPED | OUTPATIENT
Start: 2020-10-20 | End: 2020-12-16

## 2020-10-20 RX ORDER — AMLODIPINE BESYLATE 2.5 MG/1
TABLET ORAL
Qty: 90 TAB | Refills: 3 | Status: SHIPPED | OUTPATIENT
Start: 2020-10-20 | End: 2020-12-28 | Stop reason: SINTOL

## 2020-12-03 ENCOUNTER — HOSPITAL ENCOUNTER (OUTPATIENT)
Dept: MAMMOGRAPHY | Age: 75
Discharge: HOME | End: 2020-12-03
Attending: FAMILY MEDICINE
Payer: MEDICARE

## 2020-12-03 DIAGNOSIS — Z12.31 ENCOUNTER FOR SCREENING MAMMOGRAM FOR MALIGNANT NEOPLASM OF BREAST: ICD-10-CM

## 2020-12-03 PROCEDURE — 77063 BREAST TOMOSYNTHESIS BI: CPT

## 2020-12-16 ENCOUNTER — OFFICE VISIT (OUTPATIENT)
Dept: FAMILY MEDICINE CLINIC | Age: 75
End: 2020-12-16
Payer: MEDICARE

## 2020-12-16 VITALS
HEIGHT: 65 IN | HEART RATE: 81 BPM | DIASTOLIC BLOOD PRESSURE: 70 MMHG | WEIGHT: 222.4 LBS | TEMPERATURE: 97.5 F | SYSTOLIC BLOOD PRESSURE: 130 MMHG | BODY MASS INDEX: 37.05 KG/M2 | OXYGEN SATURATION: 99 % | RESPIRATION RATE: 16 BRPM

## 2020-12-16 DIAGNOSIS — I10 BENIGN ESSENTIAL HYPERTENSION: ICD-10-CM

## 2020-12-16 DIAGNOSIS — I87.2 VENOUS STASIS DERMATITIS OF LEFT LOWER EXTREMITY: ICD-10-CM

## 2020-12-16 DIAGNOSIS — N18.31 STAGE 3A CHRONIC KIDNEY DISEASE (HCC): ICD-10-CM

## 2020-12-16 DIAGNOSIS — E11.21 TYPE 2 DIABETES WITH NEPHROPATHY (HCC): ICD-10-CM

## 2020-12-16 DIAGNOSIS — I87.2 VENOUS INSUFFICIENCY OF BOTH LOWER EXTREMITIES: ICD-10-CM

## 2020-12-16 DIAGNOSIS — Z09 HOSPITAL DISCHARGE FOLLOW-UP: Primary | ICD-10-CM

## 2020-12-16 DIAGNOSIS — L03.116 CELLULITIS OF LEFT LOWER LEG: ICD-10-CM

## 2020-12-16 DIAGNOSIS — E11.9 DIABETES MELLITUS TYPE 2, NONINSULIN DEPENDENT (HCC): ICD-10-CM

## 2020-12-16 DIAGNOSIS — E66.01 SEVERE OBESITY (BMI 35.0-39.9) WITH COMORBIDITY (HCC): ICD-10-CM

## 2020-12-16 PROCEDURE — G8510 SCR DEP NEG, NO PLAN REQD: HCPCS | Performed by: FAMILY MEDICINE

## 2020-12-16 PROCEDURE — 99215 OFFICE O/P EST HI 40 MIN: CPT | Performed by: FAMILY MEDICINE

## 2020-12-16 PROCEDURE — G8752 SYS BP LESS 140: HCPCS | Performed by: FAMILY MEDICINE

## 2020-12-16 PROCEDURE — G8427 DOCREV CUR MEDS BY ELIG CLIN: HCPCS | Performed by: FAMILY MEDICINE

## 2020-12-16 PROCEDURE — G8754 DIAS BP LESS 90: HCPCS | Performed by: FAMILY MEDICINE

## 2020-12-16 PROCEDURE — 1090F PRES/ABSN URINE INCON ASSESS: CPT | Performed by: FAMILY MEDICINE

## 2020-12-16 PROCEDURE — G8417 CALC BMI ABV UP PARAM F/U: HCPCS | Performed by: FAMILY MEDICINE

## 2020-12-16 PROCEDURE — G8399 PT W/DXA RESULTS DOCUMENT: HCPCS | Performed by: FAMILY MEDICINE

## 2020-12-16 PROCEDURE — 1101F PT FALLS ASSESS-DOCD LE1/YR: CPT | Performed by: FAMILY MEDICINE

## 2020-12-16 PROCEDURE — G8536 NO DOC ELDER MAL SCRN: HCPCS | Performed by: FAMILY MEDICINE

## 2020-12-16 RX ORDER — CLINDAMYCIN HYDROCHLORIDE 150 MG/1
150 CAPSULE ORAL 2 TIMES DAILY
Qty: 14 CAP | Refills: 0 | Status: SHIPPED | OUTPATIENT
Start: 2020-12-16 | End: 2020-12-23

## 2020-12-16 RX ORDER — BUMETANIDE 0.5 MG/1
1 TABLET ORAL 2 TIMES DAILY
COMMUNITY
Start: 2020-12-16 | End: 2020-12-28 | Stop reason: ALTCHOICE

## 2020-12-16 RX ORDER — TRIAMCINOLONE ACETONIDE 5 MG/G
OINTMENT TOPICAL 2 TIMES DAILY
Qty: 30 G | Refills: 0 | Status: SHIPPED | OUTPATIENT
Start: 2020-12-16 | End: 2020-12-28

## 2020-12-16 RX ORDER — POTASSIUM CHLORIDE 20 MEQ/1
20 TABLET, EXTENDED RELEASE ORAL DAILY
Qty: 90 TAB | Refills: 1 | Status: SHIPPED | OUTPATIENT
Start: 2020-12-16 | End: 2020-12-28 | Stop reason: ALTCHOICE

## 2020-12-16 NOTE — LETTER
12/16/2020 11:34 AM 
 
Ms. Megan Valladares [address] [id number] Medical Records Dept STAT Request for H&P and Discharge summary Megan Valladares 1945 Admit date: 12/9/2020 [facility] 
 
 
 
 
Sincerely, Génesis Ocampo MD

## 2020-12-16 NOTE — PROGRESS NOTES
Chief Complaint   Patient presents with   [facility] Follow Up     [facility]    Skin Infection     Left leg     1. Have you been to the ER, urgent care clinic since your last visit? Hospitalized since your last visit? Yes Where: [facility] 12/9/2020    2. Have you seen or consulted any other health care providers outside of the [address] [place] [address] since your last visit? Include any pap smears or colon screening.  No

## 2020-12-16 NOTE — PROGRESS NOTES
Chief Complaint   Patient presents with   [facility] Follow Up     [place] 12/9-12/12    Skin Infection     Cellulitis Left Lower Leg       HISTORY OF PRESENT ILLNESS   HPI  Patient presents for hospital follow up from [facility] 12/9-12/12 for Cellulitis LLE  She has h/o Chronic BLE venous insufficiency, Obesity, HTN, CKD III, Type 2 NIDDM, CAD  She has been more sedentary during Covid pandemic and picked up some extra weight  She also has been more home bound in general helping to care for her , sister and grandson. She is prescribed Bumex 0.5 mg once daily but admits she was only taking it ~ 3-4 days a week prior to admission. About 3 weeks ago her leg edema gradually worsened. 1 week ago she started developing redness, warmth, pain and tenderness on the lower part of her left leg which is always her worst leg x many years. She denied having any CP, SOB, cough, fevers, chills or sweats. Her  advised that she go to [facility]   When she presented there 12/9 she was advised to proceed directly to the ER.  drove her to [facility] ER. On presentation her temp was 99.8 and her BP's were running in the 150's/80's but other vitals were stable. Vitals: 153/87, 76, 16, T 99.8 (on admission) 98.1 (on discharge), Sats 96% room air  Her WBC was elevated and d-dimer was high. Other labs were as follows on admission:  Hospital labs:  WBC 14.42 (H)  ABDOULAYE 11.63 (H)  HGB 13.0    GLUC 172    K 3.8  BUN 14  Cr 1.28 (H) (in patient's CKD baseline range)  AST 23  ALT 24  MG 2.1  Trop I <.015  Covid Neg  Lactic Acid 1.2  D-Dimer 6,378 (H)  Doppler of LLE was negative. She given IV CTX and Vanco x 1, admitted and maintained on Ancef throughout hospital stay. She was placed on Lovenox injections q12hr and given oral Tylenol for pain relief. She was not given additional diuretics.   The swelling and redness showed improvement throughout the hospital course and her course was otherwise uncomplicated. She was dc'd back home on rx for Keflex 500 mg qid x 5 days. She is on her last day. Since being back home and sitting more w/o elevating her legs or wearing her compression stockings, the swelling is building back up. She started taking 2 Bumex tabs daily since being dc'd from hospital but bc she is sitting most of the day and not elevating, it is only helping a little. The redness is starting to come back. Tender. No drainage. No fevers. Pain is not as bad as it was prior to admission. Ambulating w/o difficulty. She does not check home BS's. Her BP's have been good. She last saw her cardiologist for annual checkup in July, no changes were made at that time as she has been stable. REVIEW OF SYMPTOMS   Review of Systems   Constitutional: Negative. Negative for chills, diaphoresis and fever. Respiratory: Negative. Negative for cough, shortness of breath and wheezing. Cardiovascular: Negative for chest pain, palpitations, orthopnea and PND. Gastrointestinal: Negative. Genitourinary: Negative. Neurological: Negative. Endo/Heme/Allergies: Negative. Psychiatric/Behavioral: Negative. PROBLEM LIST/MEDICAL HISTORY     Problem List  Date Reviewed: 12/16/2020          Codes Class Noted    Angina at rest [facility]) ICD-10-CM: I20.8  ICD-9-CM: 413.9  9/14/2020    Overview Addendum 9/14/2020  9:35 AM by Shwetha Sanchez MD     [facility] , Cardiac Cath, reportedly mild CAD, Dr. Eddie Aleman             Type 2 diabetes with nephropathy [facility]) ICD-10-CM: E11.21  ICD-9-CM: 250.40, 583.81  5/13/2019        Severe obesity (BMI 35.0-39. 9) with comorbidity [facility]) ICD-10-CM: E66.01  ICD-9-CM: 278.01  5/4/2018        ACP (advance care planning) ICD-10-CM: Z71.89  ICD-9-CM: V65.49  5/2/2017    Overview Addendum 5/4/2018 10:52 AM by Shwetha Sanchez MD     Initial ACP discussion [phone]. AMD form reviewed and copy provided. NN/facilitator to discuss with patient. Discussion and info again                Diabetes mellitus type 2, noninsulin dependent ([facility] ICD-10-CM: E11.9  ICD-9-CM: 250.00  8/15/2016    Overview Signed 8/15/2016 10:36 AM by Luis Manuel Dunlap MD     2012, HgbA1c 6.5             Benign essential hypertension ICD-10-CM: I10  ICD-9-CM: 401.1  2016    Overview Signed 2016 11:03 AM by Luis Manuel Dunlap MD     DX              CKD (chronic kidney disease), stage III ([facility] ICD-10-CM: N18.30  ICD-9-CM: 585.3  2013    Overview Addendum 2020  9:12 AM by Luis Manuel Dunlap MD     ; Dr Willie Penaloza al; Renal US 2013; follow up prn             Vitamin D deficiency ICD-10-CM: E55.9  ICD-9-CM: 268.9  2013    Overview Signed 2013  9:25 AM by Luis Manuel Dunlap MD     2013; weekly rx vit D per Nephrology             Dyspepsia & Heartburn ICD-10-CM: R10.13  ICD-9-CM: 536.8  2012    Overview Signed 2012  9:00 AM by Luis Manuel Dunlap MD     , cardiac w/u negative; improved w/ Pepcid             Impaired fasting glucose ICD-10-CM: R73.01  ICD-9-CM: 790.21  2012    Overview Signed 2012  9:07 AM by Luis Manuel Dunlap MD                  Chest pain, unspecified ICD-10-CM: R07.9  ICD-9-CM: 786.50  2012    Overview Signed 2012  9:00 AM by Luis Manuel Dunlap MD     2012, Dr Ayden Graham; Cardiac cath negative, GI related             Hypercholesterolemia ICD-10-CM: E78.00  ICD-9-CM: 272.0  2011        Mild renal insufficiency ICD-10-CM: N28.9  ICD-9-CM: 593.9  2011    Overview Signed 2013  9:22 AM by Luis Manuel Dunlap MD                  Bilateral Dependent Foot & Ankle Edema, L>R ICD-10-CM: R60.9  ICD-9-CM: 782.3  2010         (normal spontaneous vaginal delivery) x3 ICD-10-CM: O80  ICD-9-CM: 650  Unknown    Overview Signed 5/3/2010 10:46 AM by Luis Manuel Dunlap MD      x4, 1 fetal demise after delivery at [facility] Miscarriage x 2 ICD-10-CM: O03.9  ICD-9-CM: 634.90  Unknown        S/P D&C (status post dilation and curettage) ICD-10-CM: S42.978  ICD-9-CM: V45.89  Unknown    Overview Signed 11/11/2010 12:08 PM by Camilla Murdock MD     X1, SAB  x1, AUB             S/p laparoscopic cholecystectomy gallstones ICD-10-CM: Z90.49  ICD-9-CM: V45.89  Unknown    Overview Signed 5/3/2010 10:48 AM by Camilla Murdock MD     1999             S/p Rt bunionectomy ICD-10-CM: Z98.890  ICD-9-CM: V45.89  Unknown        Lt Ankle edema, s/p surgery ICD-10-CM: M25.473  ICD-9-CM: 719.07  Unknown    Overview Signed 5/3/2010 10:49 AM by Camilla Murdock MD     Early 6818'A                       PAST SURGICAL HISTORY     Past Surgical History:   Procedure Laterality Date    CARDIAC CATHETERIZATION  1/27/2012    normal, Dr Idalia Brooks, but given NTG just in case to use prn    HX BREAST BIOPSY Left     benign needle years ago    HX BUNIONECTOMY Right     ~2005    HX BUNIONECTOMY Left 12/15/2015    Dr. Mendoza Read HX COLONOSCOPY  06/23/2017    Dr Kandis Conner - follow up in 10 years    HX COLONOSCOPY  06/04/2012    Dr Kandis Conner, ok per pt except diverticulosis, small benign polyp; repeat 5 yrs    HX COLONOSCOPY  ~2000    normal, f/u 10 yrs; Dr Mely Maravilla  x2    SAB x 1, AUB x1    HX HEART CATHETERIZATION  12/2019    [facility] Dr. Alessandro Lay    HX KNEE REPLACEMENT  6/2010    Right TKR; Dr Julian Wade    gallstones    HX ORTHOPAEDIC  1990's    left ankle surgery    HX PARTIAL HYSTERECTOMY  ~2000    AUB, benign disease    JARRELL MAMMOGRAM SCREEN BILAT  annually    [facility]         MEDICATIONS     Current Outpatient Medications   Medication Sig    bumetanide (BUMEX) 0.5 mg tablet TAKE 1 TABLET BY MOUTH  DAILY AS NEEDED.  TAKE WITH  POTASSIUM RICH FOOD OR  BEVERAGE    amLODIPine (NORVASC) 2.5 mg tablet TAKE 1 TABLET EVERY DAY FOR HYPERTENSION    metoprolol succinate (TOPROL-XL) 25 mg XL tablet TAKE ONE TABLET BY MOUTH  DAILY FOR HYPERTENSION    TURMERIC PO Take  by mouth daily.  ISOSORBIDE PO Take 1 Tab by mouth daily. Per cardiology Dr. Yo Schofield since     diclofenac (VOLTAREN) 1 % gel Apply 2 g to affected area four (4) times daily as needed.  folic acid/multivit-min/lutein (CENTRUM SILVER PO) Take  by mouth daily.  cyanocobalamin 1,000 mcg tablet Take 1,000 mcg by mouth daily.  acetaminophen (TYLENOL) 500 mg tablet Take 500 mg by mouth daily as needed for Pain.  cholecalciferol (VITAMIN D3) 1,000 unit tablet Take  by mouth daily.  DOCOSAHEXANOIC ACID/EPA (FISH OIL PO) Take 1 Cap by mouth daily.  aspirin 81 mg tablet Take 81 mg by mouth daily. No current facility-administered medications for this visit.            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   Tobacco Use    Smoking status: Never Smoker    Smokeless tobacco: Never Used   Substance and Sexual Activity    Alcohol use: Yes     Comment: rarely    Drug use: No    Sexual activity: Yes     Partners: Male   Other Topics Concern     Service No    Blood Transfusions No    Caffeine Concern No     Comment: 1 bottle of 16-20 oz of Pepsi a day    Occupational Exposure No    Hobby Hazards No    Sleep Concern No    Stress Concern No    Weight Concern Yes     Comment: overweight    Special Diet No    Back Care No    Exercise No    Bike Helmet Yes    Seat Belt Yes    Self-Exams Yes   Social History Narrative    Updated :    Lives at home w/ her  in their 2 story home. Moved her elderly sister in w/ them. Taking care of 8 yo grandson whose mother          IMMUNIZATIONS  Immunization History   Administered Date(s) Administered    Influenza High Dose Vaccine PF 2015, 10/02/2017, 10/05/2018    Pneumococcal Conjugate (PCV-13) 2018    Pneumococcal Polysaccharide (PPSV-23) 2010    Tdap 2020    Zoster 10/12/2012         FAMILY HISTORY     Family History   Problem Relation Age of Onset    Hypertension Mother     Arthritis-osteo Mother         B TKR   [place] Arthritis-rheumatoid Mother         juvenile    Breast Cancer Mother 80    Other Mother         Diverticulitis    Hypertension Father    [place] Diabetes Father          age 80 kidney failure; dialysis pt    Diabetes Sister     Cancer Brother          age 46 \"bone cancer\"    Alcohol abuse Brother          of cirrhosis age 58    Heart Disease Maternal Grandmother          at 80    Heart Disease Paternal Grandmother          at 80    Heart Attack Paternal Grandfather          in his 66's   Mer [place] Anesth Problems Neg Hx          VITALS     Visit Vitals  /70 (BP 1 Location: Right arm, BP Patient Position: Sitting)   Pulse 81   Temp 97.5 °F (36.4 °C) (Temporal)   Resp 16   Ht 5' 5\" (1.651 m)   Wt 222 lb 6.4 oz (100.9 kg)   LMP 2000   SpO2 99%   BMI 37.01 kg/m²          PHYSICAL EXAMINATION   Physical Exam  Vitals signs reviewed. Constitutional:       General: She is not in acute distress. Appearance: She is obese. She is not ill-appearing. Cardiovascular:      Rate and Rhythm: Normal rate and regular rhythm. Heart sounds: Normal heart sounds. No murmur. Pulmonary:      Effort: Pulmonary effort is normal. No respiratory distress. Breath sounds: Normal breath sounds. No rales. Musculoskeletal:         General: Swelling and tenderness present. Comments: Moderate edema B feet, ankles, lower legs, L>R. Area outlined in illustration denotes erythematous, warm, tender affected area. No weeping, oozing, crusting or exudate. B foot/ankle skin dry, chapped, cracked, L>R. Neurological:      Gait: Gait normal.             DIAGNOSTIC DATA         LABORATORY DATA     Results for orders placed or performed in visit on 24/93/61   METABOLIC PANEL, COMPREHENSIVE   Result Value Ref Range    Glucose 104 (H) 65 - 99 mg/dL    BUN 21 8 - 27 mg/dL    Creatinine 1.13 (H) 0.57 - 1.00 mg/dL    GFR est non-AA 47 (L) >59 mL/min/1.73    GFR est AA 54 (L) >59 mL/min/1.73    BUN/Creatinine ratio 19 12 - 28    Sodium 141 134 - 144 mmol/L    Potassium 4.7 3.5 - 5.2 mmol/L    Chloride 100 96 - 106 mmol/L    CO2 28 20 - 29 mmol/L    Calcium 9.8 8.7 - 10.3 mg/dL    Protein, total 7.1 6.0 - 8.5 g/dL    Albumin 4.6 3.7 - 4.7 g/dL    GLOBULIN, TOTAL 2.5 1.5 - 4.5 g/dL    A-G Ratio 1.8 1.2 - 2.2    Bilirubin, total 0.3 0.0 - 1.2 mg/dL    Alk.  phosphatase 112 39 - 117 IU/L    AST (SGOT) 22 0 - 40 IU/L    ALT (SGPT) 12 0 - 32 IU/L   CBC W/O DIFF   Result Value Ref Range    WBC 10.7 3.4 - 10.8 x10E3/uL    RBC 4.31 3.77 - 5.28 x10E6/uL    HGB 13.0 11.1 - 15.9 g/dL    HCT 41.0 34.0 - 46.6 %    MCV 95 79 - 97 fL    MCH 30.2 26.6 - 33.0 pg    MCHC 31.7 31.5 - 35.7 g/dL    RDW 13.7 11.7 - 15.4 %    PLATELET 679 349 - 167 x10E3/uL   LIPID PANEL   Result Value Ref Range    Cholesterol, total 190 100 - 199 mg/dL    Triglyceride 120 0 - 149 mg/dL    HDL Cholesterol 55 >39 mg/dL    VLDL Cholesterol Luis Daniel 21 5 - 40 mg/dL    LDL Chol Calc (NIH) 114 (H) 0 - 99 mg/dL   MICROALBUMIN, UR, RAND W/ MICROALB/CREAT RATIO   Result Value Ref Range    Creatinine, urine 192.9 Not Estab. mg/dL    Microalbumin, urine 37.1 Not Estab. ug/mL    Microalb/Creat ratio (ug/mg creat.) 19 0 - 29 mg/g creat   CKD REPORT   Result Value Ref Range    Interpretation Note    TSH 3RD GENERATION   Result Value Ref Range    TSH 1.540 0.450 - 4.500 uIU/mL   CVD REPORT   Result Value Ref Range    INTERPRETATION Note     PDF IMAGE Not applicable        Lab Results   Component Value Date/Time    Hemoglobin A1c 6.2 (H) 11/05/2019 09:12 AM    Hemoglobin A1c 6.4 (H) 05/13/2019 03:20 PM    Hemoglobin A1c 6.5 (H) 11/05/2018 01:08 PM    Glucose 104 (H) 09/14/2020 10:46 AM    Microalb/Creat ratio (ug/mg creat.) 19 09/14/2020 10:46 AM    LDL, calculated 104 (H) 11/05/2019 09:12 AM    LDL Chol Calc (NIH) 114 (H) 09/14/2020 10:46 AM    Creatinine 1.13 (H) 09/14/2020 10:46 AM          ASSESSMENT & PLAN       ICD-10-CM ICD-9-CM    1. Hospital discharge follow-up  Z09 V67.59    2. Cellulitis of left lower leg  L03.116 682.6 clindamycin (CLEOCIN) 150 mg capsule   3. Venous insufficiency of both lower extremities  I87.2 459.81 bumetanide (BUMEX) 0.5 mg tablet      potassium chloride (K-DUR, KLOR-CON) 20 mEq tablet   4. Venous stasis dermatitis of left lower extremity  I87.2 454.1 triamcinolone acetonide (KENALOG) 0.5 % ointment thin layer bid prn as directed   5. Diabetes mellitus type 2, noninsulin dependent (HCC)  E11.9 250.00    6. Type 2 diabetes with nephropathy (HCC)  E11.21 250.40      583.81    7. Stage 3a chronic kidney disease  N18.31 585.3    8. Benign essential hypertension  I10 401.1    9. Severe obesity (BMI 35.0-39. 9) with comorbidity ([facility]  E66.01 278.01      Patient presents for hospital follow up from [facility] 12/9-12/12 for Cellulitis LLE  S/P IV Ancef and Vancomycin in hospital.  Dc'd home on Keflex 500 mg qid x 5 days, today is her last day  Clindamycin 150 mg bid x 7 days  Increase Bumex to 1 mg bid (states she has a lot of the 0.5 mg tabs stock piled at home to use so she will take 2 tabs of it bid); add Kdur 20 meq since she will be on higher dosage of Bumex for now  Reviewed medications, effects, and potential side effects in detail  Elevate legs as much as possible  Encouraged her to be more compliant w/ her support hose which she does not wear  KVNG diet and work on wt reduction and light exercise around the home  Follow up w/ me in ~ 10 days to reassess  Medication risks/benefits/costs/interactions/alternatives discussed with patient. Advised patient to call back in the interim if symptoms worsen or persist despite current tx plan. If patient cannot reach us or should anything more severe/urgent arise she should proceed directly to the nearest emergency department. Discussed expected course/resolution/complications of diagnosis in detail with patient. Patient expressed understanding with the diagnosis and plan.

## 2020-12-28 ENCOUNTER — OFFICE VISIT (OUTPATIENT)
Dept: FAMILY MEDICINE CLINIC | Age: 75
End: 2020-12-28
Payer: MEDICARE

## 2020-12-28 VITALS
BODY MASS INDEX: 37.09 KG/M2 | SYSTOLIC BLOOD PRESSURE: 140 MMHG | TEMPERATURE: 98.2 F | HEART RATE: 79 BPM | DIASTOLIC BLOOD PRESSURE: 90 MMHG | WEIGHT: 222.6 LBS | RESPIRATION RATE: 16 BRPM | HEIGHT: 65 IN | OXYGEN SATURATION: 96 %

## 2020-12-28 DIAGNOSIS — I87.2 VENOUS STASIS DERMATITIS OF BOTH LOWER EXTREMITIES: ICD-10-CM

## 2020-12-28 DIAGNOSIS — I87.2 VENOUS INSUFFICIENCY OF BOTH LOWER EXTREMITIES: Primary | ICD-10-CM

## 2020-12-28 DIAGNOSIS — R60.9 DEPENDENT EDEMA: ICD-10-CM

## 2020-12-28 DIAGNOSIS — E11.9 DIABETES MELLITUS TYPE 2, NONINSULIN DEPENDENT (HCC): ICD-10-CM

## 2020-12-28 DIAGNOSIS — I10 BENIGN ESSENTIAL HYPERTENSION: ICD-10-CM

## 2020-12-28 DIAGNOSIS — N18.31 STAGE 3A CHRONIC KIDNEY DISEASE (HCC): ICD-10-CM

## 2020-12-28 DIAGNOSIS — I20.8 ANGINA AT REST (HCC): ICD-10-CM

## 2020-12-28 PROCEDURE — G8399 PT W/DXA RESULTS DOCUMENT: HCPCS | Performed by: FAMILY MEDICINE

## 2020-12-28 PROCEDURE — 1101F PT FALLS ASSESS-DOCD LE1/YR: CPT | Performed by: FAMILY MEDICINE

## 2020-12-28 PROCEDURE — G8536 NO DOC ELDER MAL SCRN: HCPCS | Performed by: FAMILY MEDICINE

## 2020-12-28 PROCEDURE — G8417 CALC BMI ABV UP PARAM F/U: HCPCS | Performed by: FAMILY MEDICINE

## 2020-12-28 PROCEDURE — G8755 DIAS BP > OR = 90: HCPCS | Performed by: FAMILY MEDICINE

## 2020-12-28 PROCEDURE — G8427 DOCREV CUR MEDS BY ELIG CLIN: HCPCS | Performed by: FAMILY MEDICINE

## 2020-12-28 PROCEDURE — 99213 OFFICE O/P EST LOW 20 MIN: CPT | Performed by: FAMILY MEDICINE

## 2020-12-28 PROCEDURE — G8432 DEP SCR NOT DOC, RNG: HCPCS | Performed by: FAMILY MEDICINE

## 2020-12-28 PROCEDURE — 1090F PRES/ABSN URINE INCON ASSESS: CPT | Performed by: FAMILY MEDICINE

## 2020-12-28 PROCEDURE — G8753 SYS BP > OR = 140: HCPCS | Performed by: FAMILY MEDICINE

## 2020-12-28 RX ORDER — SPIRONOLACTONE 50 MG/1
50 TABLET, FILM COATED ORAL 2 TIMES DAILY
Qty: 60 TAB | Refills: 0 | Status: SHIPPED | OUTPATIENT
Start: 2020-12-28 | End: 2021-02-01

## 2020-12-28 RX ORDER — TRIAMCINOLONE ACETONIDE 5 MG/G
OINTMENT TOPICAL 2 TIMES DAILY
Qty: 45 G | Refills: 3 | Status: SHIPPED | OUTPATIENT
Start: 2020-12-28

## 2020-12-28 NOTE — ASSESSMENT & PLAN NOTE
Stable, based on history, physical exam and review of pertinent labs, studies and medications; meds reconciled; continue current treatment plan. 2012; Dr Wendy Cheatham al; Renal US 2/2013; follow up prn  Key CAD CHF Meds             spironolactone (ALDACTONE) 50 mg tablet (Taking) Take 1 Tab by mouth two (2) times a day. Indications: high blood pressure, fluid retention in legs    metoprolol succinate (TOPROL-XL) 25 mg XL tablet (Taking) TAKE ONE TABLET BY MOUTH  DAILY FOR HYPERTENSION    DOCOSAHEXANOIC ACID/EPA (FISH OIL PO) (Taking) Take 1 Cap by mouth daily. aspirin 81 mg tablet (Taking) Take 81 mg by mouth daily. Key CKD Meds             spironolactone (ALDACTONE) 50 mg tablet (Taking) Take 1 Tab by mouth two (2) times a day. Indications: high blood pressure, fluid retention in legs    cholecalciferol (VITAMIN D3) 1,000 unit tablet (Taking) Take  by mouth daily.         Lab Results   Component Value Date/Time    GFR est non-AA 47 (L) 09/14/2020 10:46 AM    GFR est AA 54 (L) 09/14/2020 10:46 AM    Creatinine 1.13 (H) 09/14/2020 10:46 AM    BUN 21 09/14/2020 10:46 AM    Sodium 141 09/14/2020 10:46 AM    Potassium 4.7 09/14/2020 10:46 AM    Chloride 100 09/14/2020 10:46 AM    CO2 28 09/14/2020 10:46 AM

## 2020-12-28 NOTE — PATIENT INSTRUCTIONS
Leg and Ankle Edema: Care Instructions Your Care Instructions Swelling in the legs, ankles, and feet is called edema. It is common after you sit or stand for a while. Long plane flights or car rides often cause swelling in the legs and feet. You may also have swelling if you have to stand for long periods of time at your job. Problems with the veins in the legs (varicose veins) and changes in hormones can also cause swelling. Sometimes the swelling in the ankles and feet is caused by a more serious problem, such as heart failure, infection, blood clots, or liver or kidney disease. Follow-up care is a key part of your treatment and safety. Be sure to make and go to all appointments, and call your doctor if you are having problems. It's also a good idea to know your test results and keep a list of the medicines you take. How can you care for yourself at home? · If your doctor gave you medicine, take it as prescribed. Call your doctor if you think you are having a problem with your medicine. · Whenever you are resting, raise your legs up. Try to keep the swollen area higher than the level of your heart. · Take breaks from standing or sitting in one position. ? Walk around to increase the blood flow in your lower legs. ? Move your feet and ankles often while you stand, or tighten and relax your leg muscles. · Wear support stockings. Put them on in the morning, before swelling gets worse. · Eat a balanced diet. Lose weight if you need to. · Limit the amount of salt (sodium) in your diet. Salt holds fluid in the body and may increase swelling. When should you call for help? Call 911 anytime you think you may need emergency care. For example, call if: 
  · You have symptoms of a blood clot in your lung (called a pulmonary embolism). These may include: 
? Sudden chest pain. ? Trouble breathing. ? Coughing up blood. Call your doctor now or seek immediate medical care if:   · You have signs of a blood clot, such as: 
? Pain in your calf, back of the knee, thigh, or groin. ? Redness and swelling in your leg or groin.  
  · You have symptoms of infection, such as: 
? Increased pain, swelling, warmth, or redness. ? Red streaks or pus. ? A fever. Watch closely for changes in your health, and be sure to contact your doctor if: 
  · Your swelling is getting worse.  
  · You have new or worsening pain in your legs.  
  · You do not get better as expected. Where can you learn more? Go to http://www.gray.com/ Enter R766 in the search box to learn more about \"Leg and Ankle Edema: Care Instructions. \" Current as of: June 26, 2019               Content Version: 12.6 © 1764-4102 [employer]. Care instructions adapted under license by [employer] (which disclaims liability or warranty for this information). If you have questions about a medical condition or this instruction, always ask your healthcare professional. [place] [address] any warranty or liability for your use of this information.

## 2020-12-28 NOTE — ASSESSMENT & PLAN NOTE
Stable, based on history, physical exam and review of pertinent labs, studies and medications; meds reconciled; continue current treatment plan, lifestyle modifications recommended. , This condition is managed by Specialist. [facility] , Cardiac Cath, reportedly mild CAD, Dr. Hassan [place] CAD CHF Meds             spironolactone (ALDACTONE) 50 mg tablet (Taking) Take 1 Tab by mouth two (2) times a day. Indications: high blood pressure, fluid retention in legs    metoprolol succinate (TOPROL-XL) 25 mg XL tablet (Taking) TAKE ONE TABLET BY MOUTH  DAILY FOR HYPERTENSION    DOCOSAHEXANOIC ACID/EPA (FISH OIL PO) (Taking) Take 1 Cap by mouth daily. aspirin 81 mg tablet (Taking) Take 81 mg by mouth daily.         Lab Results   Component Value Date/Time    Sodium 141 09/14/2020 10:46 AM    Potassium 4.7 09/14/2020 10:46 AM    Cholesterol, total 190 09/14/2020 10:46 AM    HDL Cholesterol 55 09/14/2020 10:46 AM    LDL, calculated 114 09/14/2020 10:46 AM    LDL, calculated 104 11/05/2019 09:12 AM    Triglyceride 120 09/14/2020 10:46 AM

## 2020-12-28 NOTE — PROGRESS NOTES
Chief Complaint   Patient presents with    Leg and Ankle Swelling     2 week follow up    Skin Infection     re-check    Medication Evaluation       HISTORY OF PRESENT ILLNESS   HPI  Patient presents for 2 week follow up BLE edema and cellulitis. At her last visit 12/16/20, we increased her Bumex to 1 mg bid, added K-Dur, extended antibiotic treatment using Clindamycin bid for 7 days, Kenalog oint bid, recommended compliance w/ KVNG/low sodium diet and usage of her support hose. Since implementing this care plan (minus dietary compliance), the swelling is only slightly better but the redness has completely gone away, the dryness and itching are better and the pain has gone down from an 8/10 to a 4/10 now. She denies chest pain, cough, sob, fevers, chills, sweats. In general coming along better. Surprised the swelling is not coming down as much as expected w/ increasing the water pill. Her weight has not come down at all. It is same as last visit. The K+ is troublesome getting it down due to the pill size and chalkiness but she makes it work. REVIEW OF SYMPTOMS   Review of Systems   Constitutional: Negative. Negative for chills, fever and weight loss. Respiratory: Negative. Negative for cough and shortness of breath. Cardiovascular: Negative for chest pain and palpitations. Gastrointestinal: Negative. Genitourinary: Negative. Neurological: Negative. PROBLEM LIST/MEDICAL HISTORY     Problem List  Date Reviewed: 12/28/2020          Codes Class Noted    Angina at rest [facility]) ICD-10-CM: I20.8  ICD-9-CM: 413.9  9/14/2020    Overview Addendum 9/14/2020  9:35 AM by Suzanne Garrison MD     [facility] , Cardiac Cath, reportedly mild CAD, Dr. Mandy Foss             Type 2 diabetes with nephropathy [facility]) ICD-10-CM: E11.21  ICD-9-CM: 250.40, 583.81  5/13/2019        Severe obesity (BMI 35.0-39. 9) with comorbidity ([facility] [address] ICD-10-CM: E66.01  ICD-9-CM: 278.01  5/4/2018 ACP (advance care planning) ICD-10-CM: Z71.89  ICD-9-CM: V65.49  5/2/2017    Overview Addendum 5/4/2018 10:52 AM by Maki Trevino MD     Initial ACP discussion [phone]. AMD form reviewed and copy provided. NN/facilitator to discuss with patient.  Discussion and info again 5-2018               Diabetes mellitus type 2, noninsulin dependent ([facility] [address] ICD-10-CM: E11.9  ICD-9-CM: 250.00  8/15/2016    Overview Signed 8/15/2016 10:36 AM by Maki Trevino MD     5/2012, HgbA1c 6.5             Benign essential hypertension ICD-10-CM: I10  ICD-9-CM: 401.1  5/16/2016    Overview Signed 5/16/2016 11:03 AM by Maki Trevino MD     DX 4/09             Stage 3a chronic kidney disease ICD-10-CM: N18.31  ICD-9-CM: 585.3  2/28/2013    Overview Addendum 9/14/2020  9:12 AM by Maki Trevino MD     2012; Dr Beronica Meek al; Renal US 2/2013; follow up prn             Vitamin D deficiency ICD-10-CM: E55.9  ICD-9-CM: 268.9  2/28/2013    Overview Signed 2/28/2013  9:25 AM by Maki Trevino MD     1/2013; weekly rx vit D per Nephrology             Dyspepsia & Heartburn ICD-10-CM: R10.13  ICD-9-CM: 536.8  5/16/2012    Overview Signed 5/16/2012  9:00 AM by Maki Trevino MD     2012, cardiac w/u negative; improved w/ Pepcid             Impaired fasting glucose ICD-10-CM: R73.01  ICD-9-CM: 790.21  5/16/2012    Overview Signed 5/16/2012  9:07 AM by Maki Trevino MD     2012             Chest pain, unspecified ICD-10-CM: R07.9  ICD-9-CM: 786.50  1/25/2012    Overview Signed 5/16/2012  9:00 AM by Maki Trevino MD     1/2012, Dr Paul Vaughan; Cardiac cath negative, GI related             Hypercholesterolemia ICD-10-CM: E78.00  ICD-9-CM: 272.0  11/2/2011        Mild renal insufficiency ICD-10-CM: N28.9  ICD-9-CM: 593.9  11/2/2011    Overview Signed 2/28/2013  9:22 AM by Maki Trevino MD     2012             Bilateral Dependent Foot & Ankle Edema, L>R ICD-10-CM: R60.9  ICD-9-CM: 782.3  2010         (normal spontaneous vaginal delivery) x3 ICD-10-CM: O80  ICD-9-CM: 650  Unknown    Overview Signed 5/3/2010 10:46 AM by Vinnie Johnson MD      x4, 1 fetal demise after delivery at 7mos             Miscarriage x 2 ICD-10-CM: O03.9  ICD-9-CM: 634.90  Unknown        S/P D&C (status post dilation and curettage) ICD-10-CM: Z82.957  ICD-9-CM: V45.89  Unknown    Overview Signed 2010 12:08 PM by Vinnie Johnson MD     X1, SAB  x1, AUB             S/p laparoscopic cholecystectomy gallstones ICD-10-CM: Z90.49  ICD-9-CM: V45.89  Unknown    Overview Signed 5/3/2010 10:48 AM by Vinnie Johnson MD                  S/p Rt bunionectomy ICD-10-CM: Z98.890  ICD-9-CM: V45.89  Unknown        Lt Ankle edema, s/p surgery ICD-10-CM: M25.473  ICD-9-CM: 719.07  Unknown    Overview Signed 5/3/2010 10:49 AM by Vinnie Johnson MD     Early 2488'C                       PAST SURGICAL HISTORY     Past Surgical History:   Procedure Laterality Date    CARDIAC CATHETERIZATION  2012    normal, Dr Tasha Bro, but given NTG just in case to use prn    HX BREAST BIOPSY Left     benign needle years ago    HX BUNIONECTOMY Right     ~    HX BUNIONECTOMY Left 12/15/2015    Dr. Bonny Bob HX COLONOSCOPY  2017    Dr John Carl - follow up in 10 years    HX COLONOSCOPY  2012    Dr John Carl, ok per pt except diverticulosis, small benign polyp; repeat 5 yrs    HX COLONOSCOPY  ~    normal, f/u 10 yrs; Dr Regina Delgado  x2    SAB x 1, AUB x1    HX HEART CATHETERIZATION  2019    [facility] Dr. Paul Booth    HX KNEE REPLACEMENT  2010    Right TKR; Dr Cherry Kaur    gallstones    HX ORTHOPAEDIC  's    left ankle surgery    HX PARTIAL HYSTERECTOMY  ~    AUB, benign disease    JARRELL MAMMOGRAM SCREEN BILAT  annually    [address] [facility] [place], operated by [facility] Medications   Medication Sig    bumetanide (BUMEX) 0.5 mg tablet Take 2 Tabs by mouth two (2) times a day. Indications: visible water retention, high blood pressure    potassium chloride (K-DUR, KLOR-CON) 20 mEq tablet Take 1 Tab by mouth daily.  triamcinolone acetonide (KENALOG) 0.5 % ointment Apply  to affected area two (2) times a day. Use thin layer to dry, irritated areas on lower legs    amLODIPine (NORVASC) 2.5 mg tablet TAKE 1 TABLET EVERY DAY FOR HYPERTENSION    metoprolol succinate (TOPROL-XL) 25 mg XL tablet TAKE ONE TABLET BY MOUTH  DAILY FOR HYPERTENSION    TURMERIC PO Take  by mouth daily.  ISOSORBIDE PO Take 1 Tab by mouth daily. Per cardiology Dr. Brandt Gonzalez since     diclofenac (VOLTAREN) 1 % gel Apply 2 g to affected area four (4) times daily as needed.  folic acid/multivit-min/lutein (CENTRUM SILVER PO) Take  by mouth daily.  cyanocobalamin 1,000 mcg tablet Take 1,000 mcg by mouth daily.  acetaminophen (TYLENOL) 500 mg tablet Take 500 mg by mouth daily as needed for Pain.  cholecalciferol (VITAMIN D3) 1,000 unit tablet Take  by mouth daily.  DOCOSAHEXANOIC ACID/EPA (FISH OIL PO) Take 1 Cap by mouth daily.  aspirin 81 mg tablet Take 81 mg by mouth daily. No current facility-administered medications for this visit. 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 [employer]   Tobacco Use    Smoking status: Never Smoker    Smokeless tobacco: Never Used   Substance and Sexual Activity    Alcohol use:  Yes Comment: rarely    Drug use: No    Sexual activity: Yes     Partners: Male   Other Topics Concern     Service No    Blood Transfusions No    Caffeine Concern No     Comment: 1 bottle of 16-20 oz of Pepsi a day    Occupational Exposure No    Hobby Hazards No    Sleep Concern No    Stress Concern No    Weight Concern Yes     Comment: overweight    Special Diet No    Back Care No    Exercise No    Bike Helmet Yes    Seat Belt Yes    Self-Exams Yes   Social History Narrative    Updated :    Lives at home w/ her  in their 2 story home. Moved her elderly sister in w/ them.     Taking care of 8 yo grandson whose mother          IMMUNIZATIONS  Immunization History   Administered Date(s) Administered    Influenza High Dose Vaccine PF 2015, 10/02/2017, 10/05/2018    Pneumococcal Conjugate (PCV-13) 2018    Pneumococcal Polysaccharide (PPSV-23) 2010    Tdap 2020    Zoster 10/12/2012         FAMILY HISTORY     Family History   Problem Relation Age of Onset    Hypertension Mother     Arthritis-osteo Mother         B TKR   [employer] Arthritis-rheumatoid Mother         juvenile    Breast Cancer Mother 80    Other Mother         Diverticulitis    Hypertension Father    [employer] Diabetes Father          age 80 kidney failure; dialysis pt    Diabetes Sister     Cancer Brother          age 46 \"bone cancer\"    Alcohol abuse Brother          of cirrhosis age 58    Heart Disease Maternal Grandmother          at 80   [facility] Paternal Grandmother          at 80    Heart Attack Paternal Grandfather          in his 66's   [employer] Anesth Problems Neg Hx          VITALS     Visit Vitals  BP (!) 140/90 (BP 1 Location: Right arm, BP Patient Position: Sitting)   Pulse 79   Temp 98.2 °F (36.8 °C) (Temporal)   Resp 16   Ht 5' 5\" (1.651 m)   Wt 222 lb 9.6 oz (101 kg)   LMP 2000   SpO2 96%   BMI 37.04 kg/m²          PHYSICAL EXAMINATION   Physical Exam  Vitals signs reviewed. Constitutional:       General: She is not in acute distress. Appearance: She is obese. She is not ill-appearing. Cardiovascular:      Rate and Rhythm: Normal rate and regular rhythm. Heart sounds: No murmur. No gallop. Pulmonary:      Effort: Pulmonary effort is normal. No respiratory distress. Breath sounds: Normal breath sounds. No rales. Musculoskeletal:      Comments: Mild edema of right lower leg. Moderate edema of left lower leg. Moderate edema of B ankles, L>R. The area of redness LLE has resolved. The dryness, chapping and cracking of the skin is markedly better. Skin intact. There are no wounds, rashes, lesions, exudate, crusting or weeping. Tenderness of left lower leg has decreased. DIAGNOSTIC DATA         LABORATORY DATA     Results for orders placed or performed in visit on 50/49/16   METABOLIC PANEL, COMPREHENSIVE   Result Value Ref Range    Glucose 104 (H) 65 - 99 mg/dL    BUN 21 8 - 27 mg/dL    Creatinine 1.13 (H) 0.57 - 1.00 mg/dL    GFR est non-AA 47 (L) >59 mL/min/1.73    GFR est AA 54 (L) >59 mL/min/1.73    BUN/Creatinine ratio 19 12 - 28    Sodium 141 134 - 144 mmol/L    Potassium 4.7 3.5 - 5.2 mmol/L    Chloride 100 96 - 106 mmol/L    CO2 28 20 - 29 mmol/L    Calcium 9.8 8.7 - 10.3 mg/dL    Protein, total 7.1 6.0 - 8.5 g/dL    Albumin 4.6 3.7 - 4.7 g/dL    GLOBULIN, TOTAL 2.5 1.5 - 4.5 g/dL    A-G Ratio 1.8 1.2 - 2.2    Bilirubin, total 0.3 0.0 - 1.2 mg/dL    Alk.  phosphatase 112 39 - 117 IU/L    AST (SGOT) 22 0 - 40 IU/L    ALT (SGPT) 12 0 - 32 IU/L   CBC W/O DIFF   Result Value Ref Range    WBC 10.7 3.4 - 10.8 x10E3/uL    RBC 4.31 3.77 - 5.28 x10E6/uL    HGB 13.0 11.1 - 15.9 g/dL    HCT 41.0 34.0 - 46.6 %    MCV 95 79 - 97 fL    MCH 30.2 26.6 - 33.0 pg    MCHC 31.7 31.5 - 35.7 g/dL    RDW 13.7 11.7 - 15.4 %    PLATELET 359 803 - 112 x10E3/uL   LIPID PANEL   Result Value Ref Range    Cholesterol, total 190 100 - 199 mg/dL Triglyceride 120 0 - 149 mg/dL    HDL Cholesterol 55 >39 mg/dL    VLDL, calculated 21 5 - 40 mg/dL    LDL, calculated 114 (H) 0 - 99 mg/dL   MICROALBUMIN, UR, RAND W/ MICROALB/CREAT RATIO   Result Value Ref Range    Creatinine, urine 192.9 Not Estab. mg/dL    Microalbumin, urine 37.1 Not Estab. ug/mL    Microalb/Creat ratio (ug/mg creat.) 19 0 - 29 mg/g creat   CKD REPORT   Result Value Ref Range    Interpretation Note    TSH 3RD GENERATION   Result Value Ref Range    TSH 1.540 0.450 - 4.500 uIU/mL   CVD REPORT   Result Value Ref Range    INTERPRETATION Note     PDF IMAGE Not applicable        Lab Results   Component Value Date/Time    Hemoglobin A1c 6.2 (H) 11/05/2019 09:12 AM    Hemoglobin A1c 6.4 (H) 05/13/2019 03:20 PM    Hemoglobin A1c 6.5 (H) 11/05/2018 01:08 PM    Glucose 104 (H) 09/14/2020 10:46 AM    Microalb/Creat ratio (ug/mg creat.) 19 09/14/2020 10:46 AM    LDL, calculated 114 (H) 09/14/2020 10:46 AM    LDL, calculated 104 (H) 11/05/2019 09:12 AM    Creatinine 1.13 (H) 09/14/2020 10:46 AM          ASSESSMENT & PLAN   Diagnoses and all orders for this visit:    1. Venous insufficiency of both lower extremities  -     spironolactone (ALDACTONE) 50 mg tablet; Take 1 Tab by mouth two (2) times a day. Indications: high blood pressure, fluid retention in legs    2. Bilateral Dependent Foot & Ankle Edema, L>R  -     spironolactone (ALDACTONE) 50 mg tablet; Take 1 Tab by mouth two (2) times a day. Indications: high blood pressure, fluid retention in legs    3. Venous stasis dermatitis of both lower extremities  -     triamcinolone acetonide (KENALOG) 0.5 % ointment; Apply  to affected area two (2) times a day. Use thin layer to dry, irritated areas on lower legs    4. Benign essential hypertension  -     spironolactone (ALDACTONE) 50 mg tablet; Take 1 Tab by mouth two (2) times a day. Indications: high blood pressure, fluid retention in legs    5.  Angina at rest [facility])  Assessment & Plan:  Stable, based on history, physical exam and review of pertinent labs, studies and medications; meds reconciled; continue current treatment plan, lifestyle modifications recommended. , This condition is managed by Specialist. [facility] , Cardiac Cath, reportedly mild CAD, Dr. Joshi Enter CAD CHF Meds             spironolactone (ALDACTONE) 50 mg tablet (Taking) Take 1 Tab by mouth two (2) times a day. Indications: high blood pressure, fluid retention in legs    metoprolol succinate (TOPROL-XL) 25 mg XL tablet (Taking) TAKE ONE TABLET BY MOUTH  DAILY FOR HYPERTENSION    DOCOSAHEXANOIC ACID/EPA (FISH OIL PO) (Taking) Take 1 Cap by mouth daily. aspirin 81 mg tablet (Taking) Take 81 mg by mouth daily. Lab Results   Component Value Date/Time    Sodium 141 09/14/2020 10:46 AM    Potassium 4.7 09/14/2020 10:46 AM    Cholesterol, total 190 09/14/2020 10:46 AM    HDL Cholesterol 55 09/14/2020 10:46 AM    LDL, calculated 114 09/14/2020 10:46 AM    LDL, calculated 104 11/05/2019 09:12 AM    Triglyceride 120 09/14/2020 10:46 AM         6. Diabetes mellitus type 2, noninsulin dependent ([facility] [address]  Assessment & Plan:  Stable, based on history, physical exam and review of pertinent labs, studies and medications; meds reconciled; lifestyle modifications recommended. Key Antihyperglycemic Medications     Patient is on no antihyperglycemic meds. Other Key Diabetic Medications             DOCOSAHEXANOIC ACID/EPA (FISH OIL PO) (Taking) Take 1 Cap by mouth daily.         Lab Results   Component Value Date/Time    Hemoglobin A1c 6.2 11/05/2019 09:12 AM    Glucose 104 09/14/2020 10:46 AM    Creatinine 1.13 09/14/2020 10:46 AM    Microalb/Creat ratio (ug/mg creat.) 19 09/14/2020 10:46 AM    Cholesterol, total 190 09/14/2020 10:46 AM    HDL Cholesterol 55 09/14/2020 10:46 AM    LDL, calculated 114 09/14/2020 10:46 AM    LDL, calculated 104 11/05/2019 09:12 AM    Triglyceride 120 09/14/2020 10:46 AM     Diabetic Foot and Eye Exam HM Status   Topic Date Due    Eye Exam  01/23/2021    Diabetic Foot Care  09/14/2021         7. Stage 3a chronic kidney disease  Assessment & Plan:  Stable, based on history, physical exam and review of pertinent labs, studies and medications; meds reconciled; continue current treatment plan. 2012; Dr Willie Penaloza al; Renal US 2/2013; follow up prn  Key CAD CHF Meds             spironolactone (ALDACTONE) 50 mg tablet (Taking) Take 1 Tab by mouth two (2) times a day. Indications: high blood pressure, fluid retention in legs    metoprolol succinate (TOPROL-XL) 25 mg XL tablet (Taking) TAKE ONE TABLET BY MOUTH  DAILY FOR HYPERTENSION    DOCOSAHEXANOIC ACID/EPA (FISH OIL PO) (Taking) Take 1 Cap by mouth daily. aspirin 81 mg tablet (Taking) Take 81 mg by mouth daily. Key CKD Meds             spironolactone (ALDACTONE) 50 mg tablet (Taking) Take 1 Tab by mouth two (2) times a day. Indications: high blood pressure, fluid retention in legs    cholecalciferol (VITAMIN D3) 1,000 unit tablet (Taking) Take  by mouth daily.         Lab Results   Component Value Date/Time    GFR est non-AA 47 (L) 09/14/2020 10:46 AM    GFR est AA 54 (L) 09/14/2020 10:46 AM    Creatinine 1.13 (H) 09/14/2020 10:46 AM    BUN 21 09/14/2020 10:46 AM    Sodium 141 09/14/2020 10:46 AM    Potassium 4.7 09/14/2020 10:46 AM    Chloride 100 09/14/2020 10:46 AM    CO2 28 09/14/2020 10:46 AM         PLAN:  DC Norvasc  DC Bumex and K-Dur  Start Aldactone 50 mg bid  Continue Toprol and per cardiology Isosorbide  Reviewed medications and side effects in detail  KVNG, DASH Diet  Weight reduction  B Knee High Support Hose  Follow up in 2-3 weeks, sooner prn

## 2020-12-28 NOTE — ASSESSMENT & PLAN NOTE
Stable, based on history, physical exam and review of pertinent labs, studies and medications; meds reconciled; lifestyle modifications recommended. Key Antihyperglycemic Medications     Patient is on no antihyperglycemic meds. Other Key Diabetic Medications             DOCOSAHEXANOIC ACID/EPA (FISH OIL PO) (Taking) Take 1 Cap by mouth daily.         Lab Results   Component Value Date/Time    Hemoglobin A1c 6.2 11/05/2019 09:12 AM    Glucose 104 09/14/2020 10:46 AM    Creatinine 1.13 09/14/2020 10:46 AM    Microalb/Creat ratio (ug/mg creat.) 19 09/14/2020 10:46 AM    Cholesterol, total 190 09/14/2020 10:46 AM    HDL Cholesterol 55 09/14/2020 10:46 AM    LDL, calculated 114 09/14/2020 10:46 AM    LDL, calculated 104 11/05/2019 09:12 AM    Triglyceride 120 09/14/2020 10:46 AM     Diabetic Foot and Eye Exam HM Status   Topic Date Due    Eye Exam  01/23/2021    Diabetic Foot Care  09/14/2021

## 2020-12-28 NOTE — PROGRESS NOTES
Chief Complaint   Patient presents with    Ankle swelling     Follow up Left ankle     1. Have you been to the ER, urgent care clinic since your last visit? Hospitalized since your last visit? Yes Where: [facility]     2. Have you seen or consulted any other health care providers outside of the [address] [place] [address] since your last visit? Include any pap smears or colon screening.  No

## 2021-01-12 ENCOUNTER — HOSPITAL ENCOUNTER (OUTPATIENT)
Dept: MAMMOGRAPHY | Age: 76
Discharge: HOME | End: 2021-01-12
Attending: FAMILY MEDICINE
Payer: MEDICARE

## 2021-01-12 DIAGNOSIS — R92.8 ABNORMAL MAMMOGRAM: ICD-10-CM

## 2021-01-12 PROCEDURE — 76642 ULTRASOUND BREAST LIMITED: CPT

## 2021-01-18 ENCOUNTER — OFFICE VISIT (OUTPATIENT)
Dept: FAMILY MEDICINE CLINIC | Age: 76
End: 2021-01-18
Payer: MEDICARE

## 2021-01-18 VITALS
WEIGHT: 224.8 LBS | HEIGHT: 65 IN | DIASTOLIC BLOOD PRESSURE: 82 MMHG | TEMPERATURE: 97.8 F | HEART RATE: 75 BPM | SYSTOLIC BLOOD PRESSURE: 126 MMHG | RESPIRATION RATE: 18 BRPM | OXYGEN SATURATION: 97 % | BODY MASS INDEX: 37.45 KG/M2

## 2021-01-18 DIAGNOSIS — I10 BENIGN ESSENTIAL HYPERTENSION: Primary | ICD-10-CM

## 2021-01-18 DIAGNOSIS — R60.9 DEPENDENT EDEMA: ICD-10-CM

## 2021-01-18 DIAGNOSIS — E66.01 SEVERE OBESITY (BMI 35.0-39.9) WITH COMORBIDITY (HCC): ICD-10-CM

## 2021-01-18 DIAGNOSIS — N18.31 STAGE 3A CHRONIC KIDNEY DISEASE (HCC): ICD-10-CM

## 2021-01-18 DIAGNOSIS — I87.2 VENOUS INSUFFICIENCY OF BOTH LOWER EXTREMITIES: ICD-10-CM

## 2021-01-18 DIAGNOSIS — I20.8 ANGINA AT REST (HCC): ICD-10-CM

## 2021-01-18 DIAGNOSIS — E11.21 TYPE 2 DIABETES WITH NEPHROPATHY (HCC): ICD-10-CM

## 2021-01-18 LAB
ANION GAP SERPL CALC-SCNC: 2 MMOL/L (ref 5–15)
BUN SERPL-MCNC: 27 MG/DL (ref 6–20)
BUN/CREAT SERPL: 20 (ref 12–20)
CALCIUM SERPL-MCNC: 10.4 MG/DL (ref 8.5–10.1)
CHLORIDE SERPL-SCNC: 105 MMOL/L (ref 97–108)
CO2 SERPL-SCNC: 31 MMOL/L (ref 21–32)
CREAT SERPL-MCNC: 1.33 MG/DL (ref 0.55–1.02)
EST. AVERAGE GLUCOSE BLD GHB EST-MCNC: 128 MG/DL
GLUCOSE SERPL-MCNC: 92 MG/DL (ref 65–100)
HBA1C MFR BLD: 6.1 % (ref 4–5.6)
POTASSIUM SERPL-SCNC: 4.6 MMOL/L (ref 3.5–5.1)
SODIUM SERPL-SCNC: 138 MMOL/L (ref 136–145)

## 2021-01-18 PROCEDURE — G8536 NO DOC ELDER MAL SCRN: HCPCS | Performed by: FAMILY MEDICINE

## 2021-01-18 PROCEDURE — 1101F PT FALLS ASSESS-DOCD LE1/YR: CPT | Performed by: FAMILY MEDICINE

## 2021-01-18 PROCEDURE — G8752 SYS BP LESS 140: HCPCS | Performed by: FAMILY MEDICINE

## 2021-01-18 PROCEDURE — 1090F PRES/ABSN URINE INCON ASSESS: CPT | Performed by: FAMILY MEDICINE

## 2021-01-18 PROCEDURE — G8399 PT W/DXA RESULTS DOCUMENT: HCPCS | Performed by: FAMILY MEDICINE

## 2021-01-18 PROCEDURE — 99213 OFFICE O/P EST LOW 20 MIN: CPT | Performed by: FAMILY MEDICINE

## 2021-01-18 PROCEDURE — G8510 SCR DEP NEG, NO PLAN REQD: HCPCS | Performed by: FAMILY MEDICINE

## 2021-01-18 PROCEDURE — G8754 DIAS BP LESS 90: HCPCS | Performed by: FAMILY MEDICINE

## 2021-01-18 PROCEDURE — G8427 DOCREV CUR MEDS BY ELIG CLIN: HCPCS | Performed by: FAMILY MEDICINE

## 2021-01-18 PROCEDURE — G8417 CALC BMI ABV UP PARAM F/U: HCPCS | Performed by: FAMILY MEDICINE

## 2021-01-18 NOTE — PATIENT INSTRUCTIONS
DASH Diet: Care Instructions Your Care Instructions The DASH diet is an eating plan that can help lower your blood pressure. DASH stands for Dietary Approaches to Stop Hypertension. Hypertension is high blood pressure. The DASH diet focuses on eating foods that are high in calcium, potassium, and magnesium. These nutrients can lower blood pressure. The foods that are highest in these nutrients are fruits, vegetables, low-fat dairy products, nuts, seeds, and legumes. But taking calcium, potassium, and magnesium supplements instead of eating foods that are high in those nutrients does not have the same effect. The DASH diet also includes whole grains, fish, and poultry. The DASH diet is one of several lifestyle changes your doctor may recommend to lower your high blood pressure. Your doctor may also want you to decrease the amount of sodium in your diet. Lowering sodium while following the DASH diet can lower blood pressure even further than just the DASH diet alone. Follow-up care is a key part of your treatment and safety. Be sure to make and go to all appointments, and call your doctor if you are having problems. It's also a good idea to know your test results and keep a list of the medicines you take. How can you care for yourself at home? Following the DASH diet · Eat 4 to 5 servings of fruit each day. A serving is 1 medium-sized piece of fruit, ½ cup chopped or canned fruit, 1/4 cup dried fruit, or 4 ounces (½ cup) of fruit juice. Choose fruit more often than fruit juice. · Eat 4 to 5 servings of vegetables each day. A serving is 1 cup of lettuce or raw leafy vegetables, ½ cup of chopped or cooked vegetables, or 4 ounces (½ cup) of vegetable juice. Choose vegetables more often than vegetable juice. · Get 2 to 3 servings of low-fat and fat-free dairy each day. A serving is 8 ounces of milk, 1 cup of yogurt, or 1 ½ ounces of cheese. · Eat 6 to 8 servings of grains each day. A serving is 1 slice of bread, 1 ounce of dry cereal, or ½ cup of cooked rice, pasta, or cooked cereal. Try to choose whole-grain products as much as possible. · Limit lean meat, poultry, and fish to 2 servings each day. A serving is 3 ounces, about the size of a deck of cards. · Eat 4 to 5 servings of nuts, seeds, and legumes (cooked dried beans, lentils, and split peas) each week. A serving is 1/3 cup of nuts, 2 tablespoons of seeds, or ½ cup of cooked beans or peas. · Limit fats and oils to 2 to 3 servings each day. A serving is 1 teaspoon of vegetable oil or 2 tablespoons of salad dressing. · Limit sweets and added sugars to 5 servings or less a week. A serving is 1 tablespoon jelly or jam, ½ cup sorbet, or 1 cup of lemonade. · Eat less than 2,300 milligrams (mg) of sodium a day. If you limit your sodium to 1,500 mg a day, you can lower your blood pressure even more. Tips for success · Start small. Do not try to make dramatic changes to your diet all at once. You might feel that you are missing out on your favorite foods and then be more likely to not follow the plan. Make small changes, and stick with them. Once those changes become habit, add a few more changes. · Try some of the following: ? Make it a goal to eat a fruit or vegetable at every meal and at snacks. This will make it easy to get the recommended amount of fruits and vegetables each day. ? Try yogurt topped with fruit and nuts for a snack or healthy dessert. ? Add lettuce, tomato, cucumber, and onion to sandwiches. ? Combine a ready-made pizza crust with low-fat mozzarella cheese and lots of vegetable toppings. Try using tomatoes, squash, spinach, broccoli, carrots, cauliflower, and onions. ? Have a variety of cut-up vegetables with a low-fat dip as an appetizer instead of chips and dip. ? Sprinkle sunflower seeds or chopped almonds over salads. Or try adding chopped walnuts or almonds to cooked vegetables. ? Try some vegetarian meals using beans and peas. Add garbanzo or kidney beans to salads. Make burritos and tacos with mashed barroso beans or black beans. Where can you learn more? Go to http://www.gray.com/ Enter F370 in the search box to learn more about \"DASH Diet: Care Instructions. \" Current as of: December 16, 2019               Content Version: 12.6 © 4979-6890 [employer]. Care instructions adapted under license by [employer] (which disclaims liability or warranty for this information). If you have questions about a medical condition or this instruction, always ask your healthcare professional. [facility] [address] any warranty or liability for your use of this information. Leg and Ankle Edema: Care Instructions Your Care Instructions Swelling in the legs, ankles, and feet is called edema. It is common after you sit or stand for a while. Long plane flights or car rides often cause swelling in the legs and feet. You may also have swelling if you have to stand for long periods of time at your job. Problems with the veins in the legs (varicose veins) and changes in hormones can also cause swelling. Sometimes the swelling in the ankles and feet is caused by a more serious problem, such as heart failure, infection, blood clots, or liver or kidney disease. Follow-up care is a key part of your treatment and safety. Be sure to make and go to all appointments, and call your doctor if you are having problems. It's also a good idea to know your test results and keep a list of the medicines you take. How can you care for yourself at home? · If your doctor gave you medicine, take it as prescribed. Call your doctor if you think you are having a problem with your medicine. · Whenever you are resting, raise your legs up. Try to keep the swollen area higher than the level of your heart. · Take breaks from standing or sitting in one position. ? Walk around to increase the blood flow in your lower legs. ? Move your feet and ankles often while you stand, or tighten and relax your leg muscles. · Wear support stockings. Put them on in the morning, before swelling gets worse. · Eat a balanced diet. Lose weight if you need to. · Limit the amount of salt (sodium) in your diet. Salt holds fluid in the body and may increase swelling. When should you call for help? Call 911 anytime you think you may need emergency care. For example, call if: 
  · You have symptoms of a blood clot in your lung (called a pulmonary embolism). These may include: 
? Sudden chest pain. ? Trouble breathing. ? Coughing up blood. Call your doctor now or seek immediate medical care if: 
  · You have signs of a blood clot, such as: 
? Pain in your calf, back of the knee, thigh, or groin. ? Redness and swelling in your leg or groin.  
  · You have symptoms of infection, such as: 
? Increased pain, swelling, warmth, or redness. ? Red streaks or pus. ? A fever. Watch closely for changes in your health, and be sure to contact your doctor if: 
  · Your swelling is getting worse.  
  · You have new or worsening pain in your legs.  
  · You do not get better as expected. Where can you learn more? Go to http://www.gray.com/ Enter E565 in the search box to learn more about \"Leg and Ankle Edema: Care Instructions. \" Current as of: June 26, 2019               Content Version: 12.6 © 6400-9940 [email or web address]. Care instructions adapted under license by [employer] (which disclaims liability or warranty for this information). If you have questions about a medical condition or this instruction, always ask your healthcare professional. [address] any warranty or liability for your use of this information.

## 2021-01-18 NOTE — ASSESSMENT & PLAN NOTE
Well Controlled, based on history, physical exam and review of pertinent labs, studies and medications; meds reconciled; continue current treatment plan, lifestyle modifications recommended. Key CAD CHF Meds             spironolactone (ALDACTONE) 50 mg tablet (Taking) Take 1 Tab by mouth two (2) times a day. Indications: high blood pressure, fluid retention in legs    metoprolol succinate (TOPROL-XL) 25 mg XL tablet (Taking) TAKE ONE TABLET BY MOUTH  DAILY FOR HYPERTENSION    DOCOSAHEXANOIC ACID/EPA (FISH OIL PO) (Taking) Take 1 Cap by mouth daily. aspirin 81 mg tablet (Taking) Take 81 mg by mouth daily.

## 2021-01-18 NOTE — ASSESSMENT & PLAN NOTE
Uncontrolled, based on history, physical exam and review of pertinent labs, studies and medications; meds reconciled; lifestyle modifications recommended. Reviewed diet, nutrition, exercise, weight management, BMI/goals. DASH Diet handouts given

## 2021-01-18 NOTE — ASSESSMENT & PLAN NOTE
Improving, based on history, physical exam and review of pertinent labs, studies and medications; meds reconciled; continue current treatment plan, lifestyle modifications recommended. Key CAD CHF Meds             spironolactone (ALDACTONE) 50 mg tablet (Taking) Take 1 Tab by mouth two (2) times a day.  Indications: high blood pressure, fluid retention in legs

## 2021-01-18 NOTE — ASSESSMENT & PLAN NOTE
Stable, based on history, physical exam and review of pertinent labs, studies and medications; meds reconciled; continue current treatment plan, lifestyle modifications recommended. , This condition is managed by Specialist. Followed by cardiologist Dr. Kamini Toro, [phone] , Mild CAD  Key CAD CHF Meds             spironolactone (ALDACTONE) 50 mg tablet (Taking) Take 1 Tab by mouth two (2) times a day. Indications: high blood pressure, fluid retention in legs    metoprolol succinate (TOPROL-XL) 25 mg XL tablet (Taking) TAKE ONE TABLET BY MOUTH  DAILY FOR HYPERTENSION    DOCOSAHEXANOIC ACID/EPA (FISH OIL PO) (Taking) Take 1 Cap by mouth daily. aspirin 81 mg tablet (Taking) Take 81 mg by mouth daily.

## 2021-01-18 NOTE — PROGRESS NOTES
Chief Complaint   Patient presents with    Hypertension     follow up    Leg Swelling     follow up    Medication Evaluation       HISTORY OF PRESENT ILLNESS   HPI  Patient presents for 3 week follow up from 12-28 visit at which time we dc'd Norvasc and changed Bumex with K+ to Aldactone 50 mg bid. Since that time her leg edema is improving. The rash is clearing up w/ continued use of topical cream as well. She is more compliant w/ her knee high support stockings. She has not checked her BP's since last visit but it is much better in office today. She is tolerating well w/o reaction or side effects. Still not adhering to healthy diet. No exercise. Wt up 2 lbs. Staying in the 220's for a while now. Overall feeling well otherwise. She does not check her BS's. She sees cardiologist annually for CAD/Angina follow up. Stable on regimen of asa, Isordil and Bblocker. REVIEW OF SYMPTOMS   Review of Systems   Constitutional: Negative. Eyes: Negative. Respiratory: Negative. Cardiovascular: Negative for chest pain, palpitations, orthopnea and PND. Gastrointestinal: Negative. Genitourinary: Negative. Neurological: Negative. Endo/Heme/Allergies: Negative. Psychiatric/Behavioral: Negative. PROBLEM LIST/MEDICAL HISTORY     Problem List  Date Reviewed: 1/18/2021          Codes Class Noted    Venous insufficiency of both lower extremities ICD-10-CM: I87.2  ICD-9-CM: 459.81  12/28/2020        Venous stasis dermatitis of both lower extremities ICD-10-CM: I87.2  ICD-9-CM: 454.1  12/28/2020        Angina at rest [facility]) ICD-10-CM: I20.8  ICD-9-CM: 413.9  9/14/2020    Overview Addendum 9/14/2020  9:35 AM by Ko Haynes MD     [facility] , Cardiac Cath, reportedly mild CAD, Dr. Caroline Mann             Type 2 diabetes with nephropathy [facility]) ICD-10-CM: E11.21  ICD-9-CM: 250.40, 583.81  5/13/2019        Severe obesity (BMI 35.0-39. 9) with comorbidity ([address] ICD-10-CM: E66.01  ICD-9-CM: 278.01  5/4/2018        ACP (advance care planning) ICD-10-CM: Z71.89  ICD-9-CM: V65.49  5/2/2017    Overview Addendum 5/4/2018 10:52 AM by Gina Thomas MD     Initial ACP discussion [phone]. AMD form reviewed and copy provided. NN/facilitator to discuss with patient.  Discussion and info again 5-2018               Diabetes mellitus type 2, noninsulin dependent ([facility] [address] ICD-10-CM: E11.9  ICD-9-CM: 250.00  8/15/2016    Overview Signed 8/15/2016 10:36 AM by Gina Thomas MD     5/2012, HgbA1c 6.5             Benign essential hypertension ICD-10-CM: I10  ICD-9-CM: 401.1  5/16/2016    Overview Signed 5/16/2016 11:03 AM by Gina Thomas MD     DX 4/09             Stage 3a chronic kidney disease ICD-10-CM: N18.31  ICD-9-CM: 585.3  2/28/2013    Overview Addendum 9/14/2020  9:12 AM by Gina Thomas MD     2012; Dr Malathi Frank al; Renal US 2/2013; follow up prn             Vitamin D deficiency ICD-10-CM: E55.9  ICD-9-CM: 268.9  2/28/2013    Overview Signed 2/28/2013  9:25 AM by Gina Thomas MD     1/2013; weekly rx vit D per Nephrology             Dyspepsia & Heartburn ICD-10-CM: R10.13  ICD-9-CM: 536.8  5/16/2012    Overview Signed 5/16/2012  9:00 AM by Gina Thomas MD     2012, cardiac w/u negative; improved w/ Pepcid             Impaired fasting glucose ICD-10-CM: R73.01  ICD-9-CM: 790.21  5/16/2012    Overview Signed 5/16/2012  9:07 AM by Gina Thomas MD     2012             Chest pain, unspecified ICD-10-CM: R07.9  ICD-9-CM: 786.50  1/25/2012    Overview Signed 5/16/2012  9:00 AM by Gina Thomas MD     1/2012, Dr Jean Marie Srinivasan; Cardiac cath negative, GI related             Hypercholesterolemia ICD-10-CM: E78.00  ICD-9-CM: 272.0  11/2/2011        Mild renal insufficiency ICD-10-CM: N28.9  ICD-9-CM: 593.9  11/2/2011    Overview Signed 2/28/2013  9:22 AM by Gina Thomas MD     2012             Bilateral Dependent Foot & Ankle Edema, L>R ICD-10-CM: R60.9  ICD-9-CM: 782.3  2010         (normal spontaneous vaginal delivery) x3 ICD-10-CM: O80  ICD-9-CM: 650  Unknown    Overview Signed 5/3/2010 10:46 AM by Aubree Stafford MD      x4, 1 fetal demise after delivery at 7mos             Miscarriage x 2 ICD-10-CM: O03.9  ICD-9-CM: 634.90  Unknown        S/P D&C (status post dilation and curettage) ICD-10-CM: T33.380  ICD-9-CM: V45.89  Unknown    Overview Signed 2010 12:08 PM by Aubree Stafford MD     X1, SAB  x1, AUB             S/p laparoscopic cholecystectomy gallstones ICD-10-CM: Z90.49  ICD-9-CM: V45.89  Unknown    Overview Signed 5/3/2010 10:48 AM by Aubree Stafford MD                  S/p Rt bunionectomy ICD-10-CM: Z98.890  ICD-9-CM: V45.89  Unknown        Lt Ankle edema, s/p surgery ICD-10-CM: M25.473  ICD-9-CM: 719.07  Unknown    Overview Signed 5/3/2010 10:49 AM by Aubree Stafford MD     Early 5824'M                       PAST SURGICAL HISTORY     Past Surgical History:   Procedure Laterality Date    CARDIAC CATHETERIZATION  2012    normal, Dr Marilin Richards, but given NTG just in case to use prn    HX BREAST BIOPSY Left     benign needle years ago    HX BUNIONECTOMY Right     ~    HX BUNIONECTOMY Left 12/15/2015    Dr. Alessandro Morfin HX COLONOSCOPY  2017    Dr Yesica Sevilla - follow up in 10 years    HX COLONOSCOPY  2012    Dr Yesica Sevilla, ok per pt except diverticulosis, small benign polyp; repeat 5 yrs    HX COLONOSCOPY  ~    normal, f/u 10 yrs; Dr Fernando Bridges  x2    SAB x 1, AUB x1    HX HEART CATHETERIZATION  2019    [facility] Dr. Terry Leon    HX KNEE REPLACEMENT  2010    Right TKR; Dr Austin Hanna    gallstones    HX ORTHOPAEDIC  's    left ankle surgery    HX PARTIAL HYSTERECTOMY  ~    AUB, benign disease    JARRELL MAMMOGRAM SCREEN BILAT  annually    [facility] MEDICATIONS     Current Outpatient Medications   Medication Sig    spironolactone (ALDACTONE) 50 mg tablet Take 1 Tab by mouth two (2) times a day. Indications: high blood pressure, fluid retention in legs    triamcinolone acetonide (KENALOG) 0.5 % ointment Apply  to affected area two (2) times a day. Use thin layer to dry, irritated areas on lower legs    metoprolol succinate (TOPROL-XL) 25 mg XL tablet TAKE ONE TABLET BY MOUTH  DAILY FOR HYPERTENSION    TURMERIC PO Take  by mouth daily.  ISOSORBIDE PO Take 1 Tab by mouth daily. Per cardiology Dr. Kamini Toro since     diclofenac (VOLTAREN) 1 % gel Apply 2 g to affected area four (4) times daily as needed.  folic acid/multivit-min/lutein (CENTRUM SILVER PO) Take  by mouth daily.  cyanocobalamin 1,000 mcg tablet Take 1,000 mcg by mouth daily.  acetaminophen (TYLENOL) 500 mg tablet Take 500 mg by mouth daily as needed for Pain.  cholecalciferol (VITAMIN D3) 1,000 unit tablet Take  by mouth daily.  DOCOSAHEXANOIC ACID/EPA (FISH OIL PO) Take 1 Cap by mouth daily.  aspirin 81 mg tablet Take 81 mg by mouth daily. No current facility-administered medications for this visit. ALLERGIES     Allergies   Allergen Reactions    Ace Inhibitors Other (comments)     Renal insufficiency    Hydrochlorothiazide Other (comments)     Stopped d/t renal insufficiency, followed by Nephrologist      Norvasc [Amlodipine] Swelling     Increased leg swelling          SOCIAL HISTORY     Social History     Tobacco Use    Smoking status: Never Smoker    Smokeless tobacco: Never Used   Substance Use Topics    Alcohol use: Yes     Comment: rarely     Social History     Social History Narrative    Updated :    Lives at home w/ her  in their 2 story home. Moved her elderly sister in w/ them.     Taking care of 8 yo grandson whose mother      Diet: Nothing special    Exercise: No    Caffeine: 1 bottle of 16-20 oz of Pepsi a day    Weight: Staying in the 220's         Social History     Substance and Sexual Activity   Sexual Activity Yes    Partners: Male       IMMUNIZATIONS     Immunization History   Administered Date(s) Administered    Influenza High Dose Vaccine PF 2015, 10/02/2017, 10/05/2018    Pneumococcal Conjugate (PCV-13) 2018    Pneumococcal Polysaccharide (PPSV-23) 2010    Tdap 2020    Zoster 10/12/2012         FAMILY HISTORY     Family History   Problem Relation Age of Onset    Hypertension Mother     Arthritis-osteo Mother         B TKR   [facility] Arthritis-rheumatoid Mother         juvenile    Breast Cancer Mother 80    Other Mother         Diverticulitis    Hypertension Father    [facility] Diabetes Father          age 80 kidney failure; dialysis pt    Diabetes Sister     Cancer Brother          age 46 \"bone cancer\"    Alcohol abuse Brother          of cirrhosis age 58    Heart Disease Maternal Grandmother          at 80   [facility] Paternal Grandmother          at 80    Heart Attack Paternal Grandfather          in his 66's   [facility] Anesth Problems Neg Hx          VITALS     Visit Vitals  /82 (BP 1 Location: Right arm, BP Patient Position: Sitting)   Pulse 75   Temp 97.8 °F (36.6 °C) (Temporal)   Resp 18   Ht 5' 5\" (1.651 m)   Wt 224 lb 12.8 oz (102 kg)   LMP 2000   SpO2 97%   BMI 37.41 kg/m²          PHYSICAL EXAMINATION   Physical Exam  Vitals signs reviewed. Constitutional:       General: She is not in acute distress. Appearance: She is obese. Neck:      Musculoskeletal: Neck supple. Cardiovascular:      Rate and Rhythm: Normal rate and regular rhythm. Heart sounds: Normal heart sounds. No murmur. No friction rub. No gallop. Pulmonary:      Effort: Pulmonary effort is normal.      Breath sounds: Normal breath sounds. Abdominal:      Palpations: Abdomen is soft. Tenderness: There is no abdominal tenderness.    Musculoskeletal: Comments: Moderate leg edema but improved from last check. Rash LLE improved. Dryness much better. Skin:     General: Skin is warm. Neurological:      Gait: Gait normal.   Psychiatric:         Mood and Affect: Mood normal.                LABORATORY DATA/ANCILLARY/IMAGING     Results for orders placed or performed in visit on 21/47/42   METABOLIC PANEL, COMPREHENSIVE   Result Value Ref Range    Glucose 104 (H) 65 - 99 mg/dL    BUN 21 8 - 27 mg/dL    Creatinine 1.13 (H) 0.57 - 1.00 mg/dL    GFR est non-AA 47 (L) >59 mL/min/1.73    GFR est AA 54 (L) >59 mL/min/1.73    BUN/Creatinine ratio 19 12 - 28    Sodium 141 134 - 144 mmol/L    Potassium 4.7 3.5 - 5.2 mmol/L    Chloride 100 96 - 106 mmol/L    CO2 28 20 - 29 mmol/L    Calcium 9.8 8.7 - 10.3 mg/dL    Protein, total 7.1 6.0 - 8.5 g/dL    Albumin 4.6 3.7 - 4.7 g/dL    GLOBULIN, TOTAL 2.5 1.5 - 4.5 g/dL    A-G Ratio 1.8 1.2 - 2.2    Bilirubin, total 0.3 0.0 - 1.2 mg/dL    Alk.  phosphatase 112 39 - 117 IU/L    AST (SGOT) 22 0 - 40 IU/L    ALT (SGPT) 12 0 - 32 IU/L   CBC W/O DIFF   Result Value Ref Range    WBC 10.7 3.4 - 10.8 x10E3/uL    RBC 4.31 3.77 - 5.28 x10E6/uL    HGB 13.0 11.1 - 15.9 g/dL    HCT 41.0 34.0 - 46.6 %    MCV 95 79 - 97 fL    MCH 30.2 26.6 - 33.0 pg    MCHC 31.7 31.5 - 35.7 g/dL    RDW 13.7 11.7 - 15.4 %    PLATELET 906 387 - 596 x10E3/uL   LIPID PANEL   Result Value Ref Range    Cholesterol, total 190 100 - 199 mg/dL    Triglyceride 120 0 - 149 mg/dL    HDL Cholesterol 55 >39 mg/dL    VLDL, calculated 21 5 - 40 mg/dL    LDL, calculated 114 (H) 0 - 99 mg/dL   MICROALBUMIN, UR, RAND W/ MICROALB/CREAT RATIO   Result Value Ref Range    Creatinine, urine 192.9 Not Estab. mg/dL    Microalbumin, urine 37.1 Not Estab. ug/mL    Microalb/Creat ratio (ug/mg creat.) 19 0 - 29 mg/g creat   CKD REPORT   Result Value Ref Range    Interpretation Note    TSH 3RD GENERATION   Result Value Ref Range    TSH 1.540 0.450 - 4.500 uIU/mL   CVD REPORT   Result Value Ref Range    INTERPRETATION Note     PDF IMAGE Not applicable        Lab Results   Component Value Date/Time    Hemoglobin A1c 6.2 (H) 11/05/2019 09:12 AM    Hemoglobin A1c 6.4 (H) 05/13/2019 03:20 PM    Hemoglobin A1c 6.5 (H) 11/05/2018 01:08 PM    Glucose 104 (H) 09/14/2020 10:46 AM    Microalb/Creat ratio (ug/mg creat.) 19 09/14/2020 10:46 AM    LDL, calculated 114 (H) 09/14/2020 10:46 AM    LDL, calculated 104 (H) 11/05/2019 09:12 AM    Creatinine 1.13 (H) 09/14/2020 10:46 AM          ASSESSMENT & PLAN   Diagnoses and all orders for this visit:    1. Benign essential hypertension  Assessment & Plan:  Well Controlled, based on history, physical exam and review of pertinent labs, studies and medications; meds reconciled; continue current treatment plan, lifestyle modifications recommended. Key CAD CHF Meds             spironolactone (ALDACTONE) 50 mg tablet (Taking) Take 1 Tab by mouth two (2) times a day. Indications: high blood pressure, fluid retention in legs    metoprolol succinate (TOPROL-XL) 25 mg XL tablet (Taking) TAKE ONE TABLET BY MOUTH  DAILY FOR HYPERTENSION    DOCOSAHEXANOIC ACID/EPA (FISH OIL PO) (Taking) Take 1 Cap by mouth daily. aspirin 81 mg tablet (Taking) Take 81 mg by mouth daily. 2. Bilateral Dependent Foot & Ankle Edema, L>R    3. Venous insufficiency of both lower extremities  Assessment & Plan:  Improving, based on history, physical exam and review of pertinent labs, studies and medications; meds reconciled; continue current treatment plan, lifestyle modifications recommended. Key CAD CHF Meds             spironolactone (ALDACTONE) 50 mg tablet (Taking) Take 1 Tab by mouth two (2) times a day.  Indications: high blood pressure, fluid retention in legs              4. Type 2 diabetes with nephropathy (HCC)  Assessment & Plan:  Stable, based on history, physical exam and review of pertinent labs, studies and medications; meds reconciled; continue current treatment plan, lifestyle modifications recommended. Key Antihyperglycemic Medications     Patient is on no antihyperglycemic meds. Lab Results   Component Value Date/Time    Hemoglobin A1c 6.2 (H) 11/05/2019 09:12 AM    Hemoglobin A1c 6.4 (H) 05/13/2019 03:20 PM    Hemoglobin A1c 6.5 (H) 11/05/2018 01:08 PM     Lab Results   Component Value Date/Time    GFR est AA 54 (L) 09/14/2020 10:46 AM    GFR est non-AA 47 (L) 09/14/2020 10:46 AM    Creatinine 1.13 (H) 09/14/2020 10:46 AM    BUN 21 09/14/2020 10:46 AM    Sodium 141 09/14/2020 10:46 AM    Potassium 4.7 09/14/2020 10:46 AM    Chloride 100 09/14/2020 10:46 AM    CO2 28 09/14/2020 10:46 AM           Orders:  -     METABOLIC PANEL, BASIC; Future  -     HEMOGLOBIN A1C WITH EAG; Future    5. Stage 3a chronic kidney disease  -     METABOLIC PANEL, BASIC; Future    6. Severe obesity (BMI 35.0-39. 9) with comorbidity [facility])  Assessment & Plan:  Uncontrolled, based on history, physical exam and review of pertinent labs, studies and medications; meds reconciled; lifestyle modifications recommended. Reviewed diet, nutrition, exercise, weight management, BMI/goals. DASH Diet handouts given      7. Angina at rest [facility])  Assessment & Plan:  Stable, based on history, physical exam and review of pertinent labs, studies and medications; meds reconciled; continue current treatment plan, lifestyle modifications recommended. , This condition is managed by Specialist. Followed by cardiologist Dr. Luz Gregg, [phone] , Mild CAD  Key CAD CHF Meds             spironolactone (ALDACTONE) 50 mg tablet (Taking) Take 1 Tab by mouth two (2) times a day. Indications: high blood pressure, fluid retention in legs    metoprolol succinate (TOPROL-XL) 25 mg XL tablet (Taking) TAKE ONE TABLET BY MOUTH  DAILY FOR HYPERTENSION    DOCOSAHEXANOIC ACID/EPA (FISH OIL PO) (Taking) Take 1 Cap by mouth daily. aspirin 81 mg tablet (Taking) Take 81 mg by mouth daily.             Non fasting follow up BMP and HgbA1c checked today  Cardiovascular risk and specific lipid/LDL/BS/Hgba1c/BP goals reviewed  Reviewed diet, nutrition, exercise, weight management, BMI/goals. DASH Diet handouts given to patient again today  Reviewed medications and side effects in detail  Further follow up & other recommendations pending review of labs.  If all remains good and stable, follow up in 9-2021 for annual wellness visit and fasting follow up

## 2021-01-18 NOTE — PROGRESS NOTES
Chief Complaint   Patient presents with    Hypertension     follow up    Leg Swelling     follow up    Medication Evaluation     1. Have you been to the ER, urgent care clinic since your last visit? Hospitalized since your last visit? No    2. Have you seen or consulted any other health care providers outside of the [address] [place] [address] since your last visit? Include any pap smears or colon screening.  No

## 2021-01-18 NOTE — ASSESSMENT & PLAN NOTE
Stable, based on history, physical exam and review of pertinent labs, studies and medications; meds reconciled; continue current treatment plan, lifestyle modifications recommended. Key Antihyperglycemic Medications     Patient is on no antihyperglycemic meds.         Lab Results   Component Value Date/Time    Hemoglobin A1c 6.2 (H) 11/05/2019 09:12 AM    Hemoglobin A1c 6.4 (H) 05/13/2019 03:20 PM    Hemoglobin A1c 6.5 (H) 11/05/2018 01:08 PM     Lab Results   Component Value Date/Time    GFR est AA 54 (L) 09/14/2020 10:46 AM    GFR est non-AA 47 (L) 09/14/2020 10:46 AM    Creatinine 1.13 (H) 09/14/2020 10:46 AM    BUN 21 09/14/2020 10:46 AM    Sodium 141 09/14/2020 10:46 AM    Potassium 4.7 09/14/2020 10:46 AM    Chloride 100 09/14/2020 10:46 AM    CO2 28 09/14/2020 10:46 AM

## 2021-01-24 ENCOUNTER — TELEPHONE (OUTPATIENT)
Dept: FAMILY MEDICINE CLINIC | Age: 76
End: 2021-01-24

## 2021-01-24 DIAGNOSIS — N18.31 STAGE 3A CHRONIC KIDNEY DISEASE (HCC): Primary | ICD-10-CM

## 2021-01-24 DIAGNOSIS — E83.52 HYPERCALCEMIA: ICD-10-CM

## 2021-01-24 NOTE — LETTER
1/27/2021 10:28 AM 
 
Ms. Rhoderick Cowden [address] [place] [address] Advise pt her HgbA1c has improved slightly from last check and remains at goal diabetic range for age. Her kidney function has declined some from her baseline and her calcium level is up. Advise to increase water intake Avoid taking any nsaids Stop taking her multivitamin Have follow up labs in 6 weeks Do not fast but needs lab appt only, get booked Orders pended Gale Cerrato LPN Sincerely, Twyla Barnard MD

## 2021-01-25 NOTE — TELEPHONE ENCOUNTER
Advise pt her HgbA1c has improved slightly from last check and remains at goal diabetic range for age. Her kidney function has declined some from her baseline and her calcium level is up.    Advise to increase water intake  Avoid taking any nsaids  Stop taking her multivitamin   Have follow up labs in 6 weeks  Do not fast but needs lab appt only, get booked  Orders pended

## 2021-03-03 ENCOUNTER — TELEPHONE (OUTPATIENT)
Dept: FAMILY MEDICINE CLINIC | Age: 76
End: 2021-03-03

## 2021-03-05 ENCOUNTER — TELEPHONE (OUTPATIENT)
Dept: FAMILY MEDICINE CLINIC | Age: 76
End: 2021-03-05

## 2021-03-05 NOTE — TELEPHONE ENCOUNTER
called to say that his wife was taken to the hospital by ambulance with rapid heart rate and SOB.  Same thing happen 1 year ago and she was taken to  [address] [place] [address] call back # [phone]
Message left on  requesting a return call to the office for more details.  Chris Atkinson LPN
Was returning Ashly Chiang' call, transferred to Ashly Chiang.
[phone]

## 2021-03-05 NOTE — TELEPHONE ENCOUNTER
Transferred call to Nurse. Pt's  wanted to let MD know that his wife is still in the hospital at [facility]. Stated her /80 Os 100%. Stated might have been stress.  Waiting on cardiologist. [place] JAMES Cuello

## 2021-03-15 ENCOUNTER — LAB ONLY (OUTPATIENT)
Dept: FAMILY MEDICINE CLINIC | Age: 76
End: 2021-03-15

## 2021-03-15 DIAGNOSIS — E83.52 HYPERCALCEMIA: ICD-10-CM

## 2021-03-15 DIAGNOSIS — N18.31 STAGE 3A CHRONIC KIDNEY DISEASE (HCC): ICD-10-CM

## 2021-03-15 LAB
25(OH)D3 SERPL-MCNC: 49.3 NG/ML (ref 30–100)
ANION GAP SERPL CALC-SCNC: 8 MMOL/L (ref 5–15)
BUN SERPL-MCNC: 19 MG/DL (ref 6–20)
BUN/CREAT SERPL: 18 (ref 12–20)
CALCIUM SERPL-MCNC: 9.5 MG/DL (ref 8.5–10.1)
CALCIUM SERPL-MCNC: 9.5 MG/DL (ref 8.5–10.1)
CHLORIDE SERPL-SCNC: 107 MMOL/L (ref 97–108)
CO2 SERPL-SCNC: 27 MMOL/L (ref 21–32)
CREAT SERPL-MCNC: 1.06 MG/DL (ref 0.55–1.02)
GLUCOSE SERPL-MCNC: 90 MG/DL (ref 65–100)
POTASSIUM SERPL-SCNC: 4.7 MMOL/L (ref 3.5–5.1)
PTH-INTACT SERPL-MCNC: 67 PG/ML (ref 18.4–88)
SODIUM SERPL-SCNC: 142 MMOL/L (ref 136–145)

## 2021-03-16 LAB — CA-I SERPL ISE-MCNC: 5.1 MG/DL (ref 4.5–5.6)

## 2021-03-20 LAB — VIT A SERPL-MCNC: 49.8 UG/DL (ref 22–69.5)

## 2021-03-22 NOTE — PROGRESS NOTES
Labs pre-ordered for review at upcoming appointment with PCP:  Future Appointments  Date Time Provider Grayson Joann  3/24/2021  1:30 PM Stuart Thomas MD [facility] BS AMB    Follow up labs all improved and overall things look good.

## 2021-03-24 ENCOUNTER — OFFICE VISIT (OUTPATIENT)
Dept: FAMILY MEDICINE CLINIC | Age: 76
End: 2021-03-24
Payer: MEDICARE

## 2021-03-24 VITALS
HEART RATE: 70 BPM | WEIGHT: 223.6 LBS | RESPIRATION RATE: 18 BRPM | BODY MASS INDEX: 37.25 KG/M2 | TEMPERATURE: 97.3 F | OXYGEN SATURATION: 98 % | SYSTOLIC BLOOD PRESSURE: 120 MMHG | DIASTOLIC BLOOD PRESSURE: 70 MMHG | HEIGHT: 65 IN

## 2021-03-24 DIAGNOSIS — E11.21 TYPE 2 DIABETES WITH NEPHROPATHY (HCC): ICD-10-CM

## 2021-03-24 DIAGNOSIS — Z09 HOSPITAL DISCHARGE FOLLOW-UP: Primary | ICD-10-CM

## 2021-03-24 DIAGNOSIS — I47.1 SUPRAVENTRICULAR TACHYCARDIA (HCC): ICD-10-CM

## 2021-03-24 DIAGNOSIS — R60.9 DEPENDENT EDEMA: ICD-10-CM

## 2021-03-24 DIAGNOSIS — I25.2 HISTORY OF NON-ST ELEVATION MYOCARDIAL INFARCTION (NSTEMI): ICD-10-CM

## 2021-03-24 DIAGNOSIS — E11.9 DIABETES MELLITUS TYPE 2, NONINSULIN DEPENDENT (HCC): ICD-10-CM

## 2021-03-24 DIAGNOSIS — Z86.79 HISTORY OF ANGINA: ICD-10-CM

## 2021-03-24 DIAGNOSIS — I10 BENIGN ESSENTIAL HYPERTENSION: ICD-10-CM

## 2021-03-24 PROBLEM — I21.4 NSTEMI (NON-ST ELEVATED MYOCARDIAL INFARCTION) (HCC): Status: ACTIVE | Noted: 2021-03-24

## 2021-03-24 PROCEDURE — 1101F PT FALLS ASSESS-DOCD LE1/YR: CPT | Performed by: FAMILY MEDICINE

## 2021-03-24 PROCEDURE — 1090F PRES/ABSN URINE INCON ASSESS: CPT | Performed by: FAMILY MEDICINE

## 2021-03-24 PROCEDURE — G8752 SYS BP LESS 140: HCPCS | Performed by: FAMILY MEDICINE

## 2021-03-24 PROCEDURE — G8510 SCR DEP NEG, NO PLAN REQD: HCPCS | Performed by: FAMILY MEDICINE

## 2021-03-24 PROCEDURE — G8754 DIAS BP LESS 90: HCPCS | Performed by: FAMILY MEDICINE

## 2021-03-24 PROCEDURE — G8427 DOCREV CUR MEDS BY ELIG CLIN: HCPCS | Performed by: FAMILY MEDICINE

## 2021-03-24 PROCEDURE — G8536 NO DOC ELDER MAL SCRN: HCPCS | Performed by: FAMILY MEDICINE

## 2021-03-24 PROCEDURE — 99215 OFFICE O/P EST HI 40 MIN: CPT | Performed by: FAMILY MEDICINE

## 2021-03-24 PROCEDURE — G8399 PT W/DXA RESULTS DOCUMENT: HCPCS | Performed by: FAMILY MEDICINE

## 2021-03-24 PROCEDURE — G8417 CALC BMI ABV UP PARAM F/U: HCPCS | Performed by: FAMILY MEDICINE

## 2021-03-24 RX ORDER — METOPROLOL SUCCINATE 25 MG/1
25 TABLET, EXTENDED RELEASE ORAL 2 TIMES DAILY
COMMUNITY
End: 2021-07-19

## 2021-03-24 RX ORDER — ISOSORBIDE MONONITRATE 30 MG/1
30 TABLET, EXTENDED RELEASE ORAL DAILY
COMMUNITY

## 2021-03-24 NOTE — PROGRESS NOTES
Identified pt with two pt identifiers(name and ). Reviewed record in preparation for visit and have obtained necessary documentation. Chief Complaint   Patient presents with   [facility] Follow Up     [facility] Cam[facility] 3/3/21 DX: SOB , Sweating and real fast heart beat. Vitals:    21 1343   Weight: 223 lb 9.6 oz (101.4 kg)   Height: 5' 5\" (1.651 m)   PainSc:   8   PainLoc: Ankle   LMP: 2000       Health Maintenance Due   Topic    Hepatitis C Screening     COVID-19 Vaccine (1)    Shingrix Vaccine Age 50> (1 of 2)    Eye Exam Retinal or Dilated        Coordination of Care Questionnaire:  :   1) Have you been to an emergency room, urgent care, or hospitalized since your last visit? If yes, where when, and reason for visit? yes [facility]      2. Have seen or consulted any other health care provider since your last visit? If yes, where when, and reason for visit? NO      Patient is accompanied by self I have received verbal consent from Rex Souza to discuss any/all medical information while they are present in the room.

## 2021-03-24 NOTE — ASSESSMENT & PLAN NOTE
[facility] 3/3/21-3/6/21 s/p cardioversion w/ IV Adenosine. Stable on increased regimen of Toprol.  EP cardiology follow up 3/30/21 at [facility] w/ Dr. Sania Greene, planning on ablation for recurrent SVT

## 2021-03-24 NOTE — PROGRESS NOTES
Chief Complaint   Patient presents with   [facility] Follow Up     [place] [facility] 3/3/21 SOB, heart palpitations, chest pain        HISTORY OF PRESENT ILLNESS   HPI  Patient presents for hospital follow up from [facility] 3/3-3/6 for SVT. She states that on 3/3 she was sitting at home an hour after supper and experienced sudden onset of heart palpitations, chest tightness, light headedness, dizziness, diaphoresis and SOB. Her  called 46. EMS found patient to be tachycardic w/ a heart rate of 170 bpm and narrow complex rhythm c/w SVT. She was given IV Adenosine 6 mg followed by 12 mg and converted to sinus rhythm and resolution of symptoms. Upon arrival to the ER, she was asymptomatic and her BP and heart rate were stable and she remained in stable condition. CXR was non acute. Labs were stable. She was planned for dc but experienced another episode of symptomatic SVT requiring another dose of IV Adenosine 12 mg. She was therefore admitted and cardiology was consulted. She apparently had a slight elevation in her d-dimer so she was placed on Lovenox bid and a Chest CTA and Dopplers of BLE were obtained and reportedly negative. She had an Echo done which was stable. She remained asymptomatic the remainder of hospital course and was dc'd home in stable condition on 3/6. Her Toprol XL 25 mg was increased to bid and she was continued on her 81 mg asa daily and Imdur 30 mg daily. She was scheduled for outpatient follow up w/ EP and sees Dr. Mitch Hwang on 3/30 to discuss ablation. Since being back home she has had no symptom recurrences and has been feeling back to her baseline. She has been under a lot of stress at home and was \"overdoing it\" prior to this. So now she has been resting and \"taking it easy\". She is feeling a lot better. She has not been monitoring her home BP's. She has not been taking the Aldactone but isnt sure why.   Her leg edema had gotten a lot better so she thinks she may have just stopped it because of that. It has remained stable except slight puffiness in her ankles at the end of the day. So she will just use the Aldactone prn for now. She also does not check her home BS's. No changes in her eating habits. Not following strict diabetic diet. Relatively sedentary. Weight is unchanged. REVIEW OF SYMPTOMS   Review of Systems   Constitutional: Negative. Negative for chills and fever. Respiratory: Negative. Negative for cough, shortness of breath and wheezing. Cardiovascular: Negative for chest pain and palpitations. Gastrointestinal: Negative. Genitourinary: Negative. Neurological: Negative. Endo/Heme/Allergies: Negative. PROBLEM LIST/MEDICAL HISTORY     Problem List  Date Reviewed: 3/24/2021          Codes Class Noted    Venous insufficiency of both lower extremities ICD-10-CM: I87.2  ICD-9-CM: 459.81  12/28/2020        Venous stasis dermatitis of both lower extremities ICD-10-CM: I87.2  ICD-9-CM: 454.1  12/28/2020        Angina at rest [facility]) ICD-10-CM: I20.8  ICD-9-CM: 413.9  9/14/2020    Overview Addendum 9/14/2020  9:35 AM by Asuncion Chou MD     [facility] , Cardiac Cath, reportedly mild CAD, Dr. Migdalia Zapaat             Type 2 diabetes with nephropathy [facility]) ICD-10-CM: E11.21  ICD-9-CM: 250.40, 583.81  5/13/2019        Severe obesity (BMI 35.0-39. 9) with comorbidity [facility]) ICD-10-CM: E66.01  ICD-9-CM: 278.01  5/4/2018        ACP (advance care planning) ICD-10-CM: Z71.89  ICD-9-CM: V65.49  5/2/2017    Overview Addendum 5/4/2018 10:52 AM by Asuncion Chou MD     Initial ACP discussion [phone]. AMD form reviewed and copy provided. NN/facilitator to discuss with patient.  Discussion and info again 5-2018               Diabetes mellitus type 2, noninsulin dependent ([facility] ICD-10-CM: E11.9  ICD-9-CM: 250.00  8/15/2016    Overview Signed 8/15/2016 10:36 AM by Asuncion Chou MD     5/2012, HgbA1c 6.5             Benign essential hypertension ICD-10-CM: I10  ICD-9-CM: 401.1  2016    Overview Signed 2016 11:03 AM by Livier Tiwari MD     DX              Stage 3a chronic kidney disease ICD-10-CM: N18.31  ICD-9-CM: 585.3  2013    Overview Addendum 2020  9:12 AM by Livier Tiwari MD     ; Dr Demetrios Frankel al; Renal US 2013; follow up prn             Vitamin D deficiency ICD-10-CM: E55.9  ICD-9-CM: 268.9  2013    Overview Signed 2013  9:25 AM by Livier Tiwari MD     2013; weekly rx vit D per Nephrology             Dyspepsia & Heartburn ICD-10-CM: R10.13  ICD-9-CM: 536.8  2012    Overview Signed 2012  9:00 AM by Livier Tiwari MD     , cardiac w/u negative; improved w/ Pepcid             Impaired fasting glucose ICD-10-CM: R73.01  ICD-9-CM: 790.21  2012    Overview Signed 2012  9:07 AM by Livier Tiwari MD                  Chest pain, unspecified ICD-10-CM: R07.9  ICD-9-CM: 786.50  2012    Overview Signed 2012  9:00 AM by Livier Tiwari MD     2012, Dr Jo Menezes; Cardiac cath negative, GI related             Hypercholesterolemia ICD-10-CM: E78.00  ICD-9-CM: 272.0  2011        Bilateral Dependent Foot & Ankle Edema, L>R ICD-10-CM: R60.9  ICD-9-CM: 782.3  2010         (normal spontaneous vaginal delivery) x3 ICD-10-CM: O80  ICD-9-CM: 441  Unknown    Overview Signed 5/3/2010 10:46 AM by Livier Tiwari MD      x4, 1 fetal demise after delivery at 11mos             Miscarriage x 2 ICD-10-CM: O03.9  ICD-9-CM: 634.90  Unknown        S/P D&C (status post dilation and curettage) ICD-10-CM: O69.932  ICD-9-CM: V45.89  Unknown    Overview Signed 2010 12:08 PM by Livier Tiwari MD     X1, SAB  x1, AUB             S/p laparoscopic cholecystectomy gallstones ICD-10-CM: Z90.49  ICD-9-CM: V45.89  Unknown    Overview Signed 5/3/2010 10:48 AM by Livier Tiwari MD     1999 S/p Rt bunionectomy ICD-10-CM: Z98.890  ICD-9-CM: V45.89  Unknown        Lt Ankle edema, s/p surgery ICD-10-CM: M25.473  ICD-9-CM: 719.07  Unknown    Overview Signed 5/3/2010 10:49 AM by Lorne Hodges MD     Early 7284'N                       PAST SURGICAL HISTORY     Past Surgical History:   Procedure Laterality Date    CARDIAC CATHETERIZATION  1/27/2012    normal, Dr Oswaldo Reveles, but given NTG just in case to use prn    HX BREAST BIOPSY Left     benign needle years ago    HX BUNIONECTOMY Right     ~2005    HX BUNIONECTOMY Left 12/15/2015    Dr. Ilene Patrick HX COLONOSCOPY  06/23/2017    Dr Gus Brennan - follow up in 10 years    HX COLONOSCOPY  06/04/2012    Dr Gus Brennan, ok per pt except diverticulosis, small benign polyp; repeat 5 yrs    HX COLONOSCOPY  ~2000    normal, f/u 10 yrs; Dr Saloni Perez  x2    SAB x 1, AUB x1    HX HEART CATHETERIZATION  12/2019    [facility] Dr. Luz Gregg    HX KNEE REPLACEMENT  6/2010    Right TKR; Dr Kelin swanson    HX ORTHOPAEDIC  1990's    left ankle surgery    HX PARTIAL HYSTERECTOMY  ~2000    AUB, benign disease    JARRELL MAMMOGRAM SCREEN BILAT  annually    [facility]         MEDICATIONS     Current Outpatient Medications   Medication Sig    metoprolol succinate (Toprol XL) 25 mg XL tablet Take 25 mg by mouth two (2) times a day.  triamcinolone acetonide (KENALOG) 0.5 % ointment Apply  to affected area two (2) times a day. Use thin layer to dry, irritated areas on lower legs    TURMERIC PO Take  by mouth daily.  ISOSORBIDE PO Take 1 Tab by mouth daily. Per cardiology Dr. Luz Gregg since     cyanocobalamin 1,000 mcg tablet Take 1,000 mcg by mouth daily.  acetaminophen (TYLENOL) 500 mg tablet Take 500 mg by mouth daily as needed for Pain.  cholecalciferol (VITAMIN D3) 1,000 unit tablet Take  by mouth daily.  aspirin 81 mg tablet Take 81 mg by mouth daily.  spironolactone (ALDACTONE) 50 mg tablet TAKE ONE TABLET BY MOUTH TWICE A DAY    diclofenac (VOLTAREN) 1 % gel Apply 2 g to affected area four (4) times daily as needed. No current facility-administered medications for this visit. ALLERGIES     Allergies   Allergen Reactions    Ace Inhibitors Other (comments)     Renal insufficiency    Hydrochlorothiazide Other (comments)     Stopped d/t renal insufficiency, followed by Nephrologist      Norvasc [Amlodipine] Swelling     Increased leg swelling          SOCIAL HISTORY     Social History     Tobacco Use    Smoking status: Never Smoker    Smokeless tobacco: Never Used   Substance Use Topics    Alcohol use: Yes     Comment: rarely     Social History     Social History Narrative    Lives at home w/ her  in their 2 story home. Moved her elderly sister in w/ them.     Taking care of 9yo grandson whose mother      Diet: Nothing special    Exercise: No    Caffeine: 1 bottle of 16-20 oz of Pepsi a day    Weight: Staying in the 220's         Social History     Substance and Sexual Activity   Sexual Activity Yes    Partners: Male       IMMUNIZATIONS     Immunization History   Administered Date(s) Administered    Covid-19, MODERNA, Mrna, Lnp-s, Pf, 100mcg/0.5mL 2021, 2021    Influenza High Dose Vaccine PF 2015, 10/02/2017, 10/05/2018    Influenza, High-dose, Quadrivalent (>65 Yrs Fluzone High Dose Quad 76329) 2020    Pneumococcal Conjugate (PCV-13) 2018    Pneumococcal Polysaccharide (PPSV-23) 2010    Tdap 2020    Zoster 10/12/2012         FAMILY HISTORY     Family History   Problem Relation Age of Onset    Hypertension Mother     Arthritis-osteo Mother         B TKR   [facility] Arthritis-rheumatoid Mother         juvenile    Breast Cancer Mother 80    Other Mother         Diverticulitis    Hypertension Father     Diabetes Father          age 80 kidney failure; dialysis pt    Diabetes Sister     Cancer Brother          age 46 \"bone cancer\"    Alcohol abuse Brother          of cirrhosis age 58    Heart Disease Maternal Grandmother          at 80    Heart Disease Paternal Grandmother          at 80    Heart Attack Paternal Grandfather          in his 66's   [facility] Anesth Problems Neg Hx          VITALS     Visit Vitals  /70 (BP 1 Location: Right upper arm, BP Patient Position: Sitting, BP Cuff Size: Large adult)   Pulse 70   Temp 97.3 °F (36.3 °C) (Temporal)   Resp 18   Ht 5' 5\" (1.651 m)   Wt 223 lb 9.6 oz (101.4 kg)   LMP 2000   SpO2 98%   BMI 37.21 kg/m²          PHYSICAL EXAMINATION   Physical Exam  Vitals signs reviewed. Constitutional:       General: She is not in acute distress. Appearance: She is obese. She is not ill-appearing. Eyes:      General: No scleral icterus. Cardiovascular:      Rate and Rhythm: Normal rate and regular rhythm. Heart sounds: Normal heart sounds. No murmur. No gallop. Pulmonary:      Effort: Pulmonary effort is normal. No respiratory distress. Breath sounds: Normal breath sounds. No wheezing or rales. Abdominal:      Palpations: Abdomen is soft. Tenderness: There is no abdominal tenderness. Musculoskeletal:         General: No tenderness. Comments: Moderate B ankle edema, improved from prior visit. No rashes, lesions or erythema. Stable and good for her today   Skin:     General: Skin is warm and dry. Neurological:      General: No focal deficit present. Mental Status: She is alert and oriented to person, place, and time. Mental status is at baseline.    Psychiatric:         Mood and Affect: Mood normal.                LABORATORY DATA/ANCILLARY/IMAGING     Results for orders placed or performed in visit on 03/15/21   VITAMIN A   Result Value Ref Range    Vitamin A, serum 49.8 22.0 - 69.5 ug/dL   VITAMIN D, 25 HYDROXY   Result Value Ref Range    Vitamin D 25-Hydroxy 49.3 30 - 100 ng/mL   PTH INTACT   Result Value Ref Range    Calcium 9.5 8.5 - 10.1 MG/DL    PTH, Intact 67.0 18.4 - 88.0 pg/mL   CALCIUM, IONIZED   Result Value Ref Range    Calcium, ionized 5.1 4.5 - 5.6 mg/dL   METABOLIC PANEL, BASIC   Result Value Ref Range    Sodium 142 136 - 145 mmol/L    Potassium 4.7 3.5 - 5.1 mmol/L    Chloride 107 97 - 108 mmol/L    CO2 27 21 - 32 mmol/L    Anion gap 8 5 - 15 mmol/L    Glucose 90 65 - 100 mg/dL    BUN 19 6 - 20 MG/DL    Creatinine 1.06 (H) 0.55 - 1.02 MG/DL    BUN/Creatinine ratio 18 12 - 20      GFR est AA >60 >60 ml/min/1.73m2    GFR est non-AA 50 (L) >60 ml/min/1.73m2    Calcium 9.5 8.5 - 10.1 MG/DL       Lab Results   Component Value Date/Time    WBC 10.7 09/14/2020 10:46 AM    HGB 13.0 09/14/2020 10:46 AM    HCT 41.0 09/14/2020 10:46 AM    PLATELET 891 38/16/0376 10:46 AM    MCV 95 09/14/2020 10:46 AM     Lab Results   Component Value Date/Time    Hemoglobin A1c 6.1 (H) 01/18/2021 11:54 AM    Hemoglobin A1c 6.2 (H) 11/05/2019 09:12 AM    Hemoglobin A1c 6.4 (H) 05/13/2019 03:20 PM    Glucose 90 03/15/2021 10:19 AM    Microalb/Creat ratio (ug/mg creat.) 19 09/14/2020 10:46 AM    LDL, calculated 114 (H) 09/14/2020 10:46 AM    LDL, calculated 104 (H) 11/05/2019 09:12 AM    Creatinine 1.06 (H) 03/15/2021 10:19 AM      Lab Results   Component Value Date/Time    Cholesterol, total 190 09/14/2020 10:46 AM    HDL Cholesterol 55 09/14/2020 10:46 AM    LDL, calculated 114 (H) 09/14/2020 10:46 AM    LDL, calculated 104 (H) 11/05/2019 09:12 AM    Triglyceride 120 09/14/2020 10:46 AM    CHOL/HDL Ratio 3.6 05/03/2010 11:36 AM     Lab Results   Component Value Date/Time    TSH 1.540 09/14/2020 10:46 AM      Lab Results   Component Value Date/Time    Sodium 142 03/15/2021 10:19 AM    Potassium 4.7 03/15/2021 10:19 AM    Chloride 107 03/15/2021 10:19 AM    CO2 27 03/15/2021 10:19 AM    Anion gap 8 03/15/2021 10:19 AM    Glucose 90 03/15/2021 10:19 AM    BUN 19 03/15/2021 10:19 AM    Creatinine 1.06 (H) 03/15/2021 10:19 AM    BUN/Creatinine ratio 18 03/15/2021 10:19 AM    GFR est AA >60 03/15/2021 10:19 AM    GFR est non-AA 50 (L) 03/15/2021 10:19 AM    Calcium 9.5 03/15/2021 10:19 AM    Calcium 9.5 03/15/2021 10:19 AM    Bilirubin, total 0.3 09/14/2020 10:46 AM    ALT (SGPT) 12 09/14/2020 10:46 AM    Alk. phosphatase 112 09/14/2020 10:46 AM    Protein, total 7.1 09/14/2020 10:46 AM    Albumin 4.6 09/14/2020 10:46 AM    Globulin 3.4 05/03/2010 11:36 AM    A-G Ratio 1.8 09/14/2020 10:46 AM          ASSESSMENT & PLAN     1. Hospital discharge follow-up [address] [place] [address] 3/3-3/6    2. Supraventricular tachycardia [facility])  Assessment & Plan:  [facility] 3/3/21-3/6/21 s/p cardioversion w/ IV Adenosine. Stable on increased regimen of Toprol. EP cardiology follow up 3/30/21 at [facility] w/ Dr. Eliot Jones, planning on ablation for recurrent SVT      3. History of non-ST elevation myocardial infarction (NSTEMI)              metoprolol succinate (Toprol XL) 25 mg XL tablet (Taking) Take 25 mg by mouth two (2) times a day. isosorbide mononitrate ER (IMDUR) 30 mg tablet (Taking) Take 30 mg by mouth daily. Per cardio Dr. Davina Noble    aspirin 81 mg tablet (Taking) Take 81 mg by mouth daily. 4. History of angina, stable, followed by cardiology Dr. Davina Noble              metoprolol succinate (Toprol XL) 25 mg XL tablet (Taking) Take 25 mg by mouth two (2) times a day. isosorbide mononitrate ER (IMDUR) 30 mg tablet (Taking) Take 30 mg by mouth daily. Per cardio Dr. Davina Noble    aspirin 81 mg tablet (Taking) Take 81 mg by mouth daily. 5. Benign essential hypertension              metoprolol succinate (Toprol XL) 25 mg XL tablet (Taking) Take 25 mg by mouth two (2) times a day. isosorbide mononitrate ER (IMDUR) 30 mg tablet (Taking) Take 30 mg by mouth daily. Per cardio Dr. Davina Noble    aspirin 81 mg tablet (Taking) Take 81 mg by mouth daily.                  6. Bilateral Dependent Foot & Ankle Edema, L>R  spironolactone (ALDACTONE) 50 mg tablet TAKE ONE TABLET BY MOUTH TWICE A DAY PRN       7. Diabetes mellitus type 2, noninsulin dependent ([address] Stable, controlled  Key Antihyperglycemic Medications     Patient is on no antihyperglycemic meds. Lab Results   Component Value Date/Time    Hemoglobin A1c 6.1 (H) 01/18/2021 11:54 AM    Hemoglobin A1c 6.2 (H) 11/05/2019 09:12 AM    Hemoglobin A1c 6.4 (H) 05/13/2019 03:20 PM   Reviewed diet, nutrition, exercise, weight management, BMI/goals. 8. Type 2 diabetes with nephropathy ([address] Stable, controlled  Lab Results   Component Value Date/Time    GFR est AA >60 03/15/2021 10:19 AM    GFR est non-AA 50 (L) 03/15/2021 10:19 AM    Creatinine 1.06 (H) 03/15/2021 10:19 AM    BUN 19 03/15/2021 10:19 AM    Sodium 142 03/15/2021 10:19 AM    Potassium 4.7 03/15/2021 10:19 AM    Chloride 107 03/15/2021 10:19 AM    CO2 27 03/15/2021 10:19 AM       Reviewed medications and side effects in detail  No changes in regimen at this time  Follow up w/ cardiology as planned  Follow up w/ me 3 months, sooner prn  Spent 45 minutes reviewing previous hospital reports & test results from [facility] and face to face with the patient discussing the diagnosis and treatment plan as well as documenting on the day of the visit.

## 2021-03-29 DIAGNOSIS — I87.2 VENOUS INSUFFICIENCY OF BOTH LOWER EXTREMITIES: ICD-10-CM

## 2021-03-29 DIAGNOSIS — R60.9 DEPENDENT EDEMA: ICD-10-CM

## 2021-03-29 DIAGNOSIS — I10 BENIGN ESSENTIAL HYPERTENSION: ICD-10-CM

## 2021-03-30 RX ORDER — SPIRONOLACTONE 50 MG/1
TABLET, FILM COATED ORAL
Qty: 60 TAB | Refills: 0 | OUTPATIENT
Start: 2021-03-30

## 2021-04-30 LAB — SARS-COV-2, NAA: NEGATIVE

## 2021-05-03 DIAGNOSIS — I47.1 SUPRAVENTRICULAR TACHYCARDIA (HCC): ICD-10-CM

## 2021-05-03 DIAGNOSIS — I25.2 HISTORY OF NON-ST ELEVATION MYOCARDIAL INFARCTION (NSTEMI): ICD-10-CM

## 2021-05-03 DIAGNOSIS — I10 BENIGN ESSENTIAL HYPERTENSION: ICD-10-CM

## 2021-05-03 DIAGNOSIS — Z86.79 HISTORY OF ANGINA: ICD-10-CM

## 2021-05-03 DIAGNOSIS — I87.2 VENOUS INSUFFICIENCY OF BOTH LOWER EXTREMITIES: ICD-10-CM

## 2021-05-03 DIAGNOSIS — R60.9 DEPENDENT EDEMA: ICD-10-CM

## 2021-05-03 RX ORDER — SPIRONOLACTONE 50 MG/1
50 TABLET, FILM COATED ORAL 2 TIMES DAILY
Qty: 180 TAB | Refills: 1 | Status: SHIPPED | OUTPATIENT
Start: 2021-05-03 | End: 2021-09-09

## 2021-05-03 RX ORDER — METOPROLOL SUCCINATE 25 MG/1
25 TABLET, EXTENDED RELEASE ORAL 2 TIMES DAILY
Qty: 180 TAB | Refills: 1 | Status: CANCELLED | OUTPATIENT
Start: 2021-05-03

## 2021-05-03 NOTE — TELEPHONE ENCOUNTER
Fax from [facility] for new rx's. Thanks, Marilyn Fleming    Last Visit: 3/24/21 MD Mirian Moreno  Next Appointment: Not scheduled  Previous Refill Encounter(s):   Spironolactone 2/1/21 180   Metoprolol - historical provider    Requested Prescriptions     Pending Prescriptions Disp Refills    metoprolol succinate (Toprol XL) 25 mg XL tablet 180 Tab 1     Sig: Take 1 Tab by mouth two (2) times a day.  spironolactone (ALDACTONE) 50 mg tablet 180 Tab 1     Sig: Take 1 Tab by mouth two (2) times a day.

## 2021-05-03 NOTE — TELEPHONE ENCOUNTER
I sent in the Aldactone only for now because I need clarification on the Toprol. She was in the hospital at [facility] in March and was seen by cardiologist and I think the dose of this med was changed but I dont know what formulation they are doing.  Please zarina patient and have her look at her actual most current bottle and read off the details of the Toprol bottle (need specifications such as Metoprolol, Lopressor, immediate release, extended release, dose, sig and prescribing doctors name)

## 2021-06-02 DIAGNOSIS — R60.9 DEPENDENT EDEMA: ICD-10-CM

## 2021-06-02 DIAGNOSIS — I87.2 VENOUS INSUFFICIENCY OF BOTH LOWER EXTREMITIES: ICD-10-CM

## 2021-06-02 DIAGNOSIS — I10 BENIGN ESSENTIAL HYPERTENSION: ICD-10-CM

## 2021-06-02 RX ORDER — SPIRONOLACTONE 50 MG/1
TABLET, FILM COATED ORAL
Qty: 60 TABLET | Refills: 0 | OUTPATIENT
Start: 2021-06-02

## 2021-07-19 DIAGNOSIS — I10 BENIGN ESSENTIAL HYPERTENSION: ICD-10-CM

## 2021-07-19 DIAGNOSIS — Z86.79 HISTORY OF ANGINA: ICD-10-CM

## 2021-07-19 DIAGNOSIS — I25.2 HISTORY OF NON-ST ELEVATION MYOCARDIAL INFARCTION (NSTEMI): ICD-10-CM

## 2021-07-19 DIAGNOSIS — I47.1 SUPRAVENTRICULAR TACHYCARDIA (HCC): ICD-10-CM

## 2021-07-19 RX ORDER — METOPROLOL SUCCINATE 25 MG/1
TABLET, EXTENDED RELEASE ORAL
Qty: 90 TABLET | Refills: 0 | Status: SHIPPED | OUTPATIENT
Start: 2021-07-19 | End: 2021-12-24

## 2021-07-22 RX ORDER — AMLODIPINE BESYLATE 2.5 MG/1
TABLET ORAL
Qty: 90 TABLET | OUTPATIENT
Start: 2021-07-22

## 2021-07-22 RX ORDER — BUMETANIDE 0.5 MG/1
TABLET ORAL
Qty: 90 TABLET | OUTPATIENT
Start: 2021-07-22

## 2021-09-09 DIAGNOSIS — I10 BENIGN ESSENTIAL HYPERTENSION: ICD-10-CM

## 2021-09-09 DIAGNOSIS — I87.2 VENOUS INSUFFICIENCY OF BOTH LOWER EXTREMITIES: ICD-10-CM

## 2021-09-09 DIAGNOSIS — R60.9 DEPENDENT EDEMA: ICD-10-CM

## 2021-09-09 RX ORDER — SPIRONOLACTONE 50 MG/1
TABLET, FILM COATED ORAL
Qty: 180 TABLET | Refills: 0 | Status: SHIPPED | OUTPATIENT
Start: 2021-09-09 | End: 2021-11-16

## 2021-11-11 ENCOUNTER — OFFICE VISIT (OUTPATIENT)
Dept: FAMILY MEDICINE CLINIC | Age: 76
End: 2021-11-11
Payer: MEDICARE

## 2021-11-11 VITALS
TEMPERATURE: 97.9 F | OXYGEN SATURATION: 97 % | DIASTOLIC BLOOD PRESSURE: 84 MMHG | WEIGHT: 220.2 LBS | HEIGHT: 65 IN | BODY MASS INDEX: 36.69 KG/M2 | HEART RATE: 80 BPM | RESPIRATION RATE: 16 BRPM | SYSTOLIC BLOOD PRESSURE: 138 MMHG

## 2021-11-11 DIAGNOSIS — Z00.00 MEDICARE ANNUAL WELLNESS VISIT, SUBSEQUENT: Primary | ICD-10-CM

## 2021-11-11 DIAGNOSIS — I87.2 VENOUS INSUFFICIENCY OF BOTH LOWER EXTREMITIES: ICD-10-CM

## 2021-11-11 DIAGNOSIS — Z23 ENCOUNTER FOR IMMUNIZATION: ICD-10-CM

## 2021-11-11 DIAGNOSIS — E78.00 HYPERCHOLESTEROLEMIA: ICD-10-CM

## 2021-11-11 DIAGNOSIS — Z23 NEEDS FLU SHOT: ICD-10-CM

## 2021-11-11 DIAGNOSIS — I10 BENIGN ESSENTIAL HYPERTENSION: ICD-10-CM

## 2021-11-11 DIAGNOSIS — N18.31 TYPE 2 DIABETES MELLITUS WITH STAGE 3A CHRONIC KIDNEY DISEASE, WITHOUT LONG-TERM CURRENT USE OF INSULIN (HCC): ICD-10-CM

## 2021-11-11 DIAGNOSIS — E11.22 TYPE 2 DIABETES MELLITUS WITH STAGE 3A CHRONIC KIDNEY DISEASE, WITHOUT LONG-TERM CURRENT USE OF INSULIN (HCC): ICD-10-CM

## 2021-11-11 DIAGNOSIS — I25.2 HISTORY OF NON-ST ELEVATION MYOCARDIAL INFARCTION (NSTEMI): ICD-10-CM

## 2021-11-11 PROBLEM — Z86.79 HISTORY OF SUPRAVENTRICULAR TACHYCARDIA: Status: ACTIVE | Noted: 2021-03-24

## 2021-11-11 LAB
COMMENT, HOLDF: NORMAL
SAMPLES BEING HELD,HOLD: NORMAL

## 2021-11-11 PROCEDURE — 1090F PRES/ABSN URINE INCON ASSESS: CPT | Performed by: FAMILY MEDICINE

## 2021-11-11 PROCEDURE — G8417 CALC BMI ABV UP PARAM F/U: HCPCS | Performed by: FAMILY MEDICINE

## 2021-11-11 PROCEDURE — G8536 NO DOC ELDER MAL SCRN: HCPCS | Performed by: FAMILY MEDICINE

## 2021-11-11 PROCEDURE — G0008 ADMIN INFLUENZA VIRUS VAC: HCPCS | Performed by: FAMILY MEDICINE

## 2021-11-11 PROCEDURE — G0439 PPPS, SUBSEQ VISIT: HCPCS | Performed by: FAMILY MEDICINE

## 2021-11-11 PROCEDURE — G8427 DOCREV CUR MEDS BY ELIG CLIN: HCPCS | Performed by: FAMILY MEDICINE

## 2021-11-11 PROCEDURE — 1101F PT FALLS ASSESS-DOCD LE1/YR: CPT | Performed by: FAMILY MEDICINE

## 2021-11-11 PROCEDURE — G8752 SYS BP LESS 140: HCPCS | Performed by: FAMILY MEDICINE

## 2021-11-11 PROCEDURE — G8510 SCR DEP NEG, NO PLAN REQD: HCPCS | Performed by: FAMILY MEDICINE

## 2021-11-11 PROCEDURE — 90694 VACC AIIV4 NO PRSRV 0.5ML IM: CPT | Performed by: FAMILY MEDICINE

## 2021-11-11 PROCEDURE — G8399 PT W/DXA RESULTS DOCUMENT: HCPCS | Performed by: FAMILY MEDICINE

## 2021-11-11 PROCEDURE — 99214 OFFICE O/P EST MOD 30 MIN: CPT | Performed by: FAMILY MEDICINE

## 2021-11-11 PROCEDURE — G8754 DIAS BP LESS 90: HCPCS | Performed by: FAMILY MEDICINE

## 2021-11-11 NOTE — PROGRESS NOTES
This is the Subsequent Medicare Annual Wellness Exam, performed 12 months or more after the Initial AWV or the last Subsequent AWV    I have reviewed the patient's medical history in detail and updated the computerized patient record. Assessment/Plan   Education and counseling provided:  Are appropriate based on today's review and evaluation  Influenza Vaccine    1. Medicare annual wellness visit, subsequent  2. Encounter for immunization  -     ADMIN INFLUENZA VIRUS VAC  3. Needs flu shot  -     FLU (FLUAD QUAD INFLUENZA VACCINE,QUAD,ADJUVANTED)       Depression Risk Factor Screening     3 most recent PHQ Screens 11/11/2021   PHQ Not Done -   Little interest or pleasure in doing things Not at all   Feeling down, depressed, irritable, or hopeless Not at all   Total Score PHQ 2 0       Alcohol Risk Screen    Do you average more than 1 drink per night or more than 7 drinks a week:  No    On any one occasion in the past three months have you have had more than 3 drinks containing alcohol:  No        Functional Ability and Level of Safety    Hearing: Hearing is good. Activities of Daily Living: The home contains: no safety equipment.   Patient does total self care  ADL Assessment 11/11/2021   Feeding yourself No Help Needed   Getting from bed to chair No Help Needed   Getting dressed No Help Needed   Bathing or showering No Help Needed   Walk across the room (includes cane/walker) No Help Needed   Using the telphone No Help Needed   Taking your medications No Help Needed   Preparing meals No Help Needed   Managing money (expenses/bills) No Help Needed   Moderately strenuous housework (laundry) No Help Needed   Shopping for personal items (toiletries/medicines) No Help Needed   Shopping for groceries No Help Needed   Driving No Help Needed   Climbing a flight of stairs No Help Needed   Getting to places beyond walking distances No Help Needed         Ambulation: with no difficulty     Fall Risk:  Fall Risk Assessment, last 12 mths 2021   Able to walk? Yes   Fall in past 12 months? 0   Do you feel unsteady? 0   Are you worried about falling 0      Abuse Screen:  Patient is not abused       Cognitive Screening    Has your family/caregiver stated any concerns about your memory: no         Health Maintenance Due     Health Maintenance Due   Topic Date Due    Eye Exam Retinal or Dilated  2021    A1C test (Diabetic or Prediabetic)  2021    MICROALBUMIN Q1  2021    Lipid Screen  2021       Patient Care Team   Patient Care Team:  Pauline Liu MD as PCP - Tracey Owen MD as PCP - [facility] Empaneled Provider    History     Patient Active Problem List   Diagnosis Code     (normal spontaneous vaginal delivery) x3 O80    Miscarriage x 2 O03.9    S/P D&C (status post dilation and curettage) Z98.890    S/p laparoscopic cholecystectomy gallstones Z90.49    S/p Rt bunionectomy Z98.890    Lt Ankle edema, s/p surgery M25.473    Bilateral Dependent Foot & Ankle Edema, L>R R60.9    Hypercholesterolemia E78.00    Chest pain, unspecified R07.9    Dyspepsia & Heartburn R10.13    Impaired fasting glucose R73.01    Stage 3a chronic kidney disease (HCC) N18.31    Vitamin D deficiency E55.9    Benign essential hypertension I10    Diabetes mellitus type 2, noninsulin dependent ([facility] [address] E11.9    ACP (advance care planning) Z71.89    Type 2 diabetes with nephropathy ([facility] [address] E11.21    History of angina Z86.79    Venous insufficiency of both lower extremities I87.2    Venous stasis dermatitis of both lower extremities I87.2    History of supraventricular tachycardia Z86.79    History of non-ST elevation myocardial infarction (NSTEMI) I25.2     Past Medical History:   Diagnosis Date    ACP (advance care planning) 2017    Initial ACP discussion . AMD form reviewed and copy provided.  NN/facilitator to discuss with patient     Arthritis     Benign essential hypertension 5/16/2016    DX 4/09    Bilateral Dependent Foot & Ankle Edema, L>R 11/11/2010    CKD (chronic kidney disease), stage III ([facility] [address] 2/28/2013 2012; Dr Chase Church Diabetes mellitus type 2, noninsulin dependent ([facility] [address] 8/15/2016    5/2012, HgbA1c 6.5    Dyspepsia 5/16/2012    History of angina 9/14/2020    [facility] , Cardiac Cath, reportedly mild CAD, Dr. Anders Miller History of non-ST elevation myocardial infarction (NSTEMI) 3/24/2021    Mild nonobstructive CAD proximal LAD on left heart cath  CJW, Echo mild LVH, EF 60-65%Cardio Dr. Anders Miller History of supraventricular tachycardia 3/24/2021    [facility] 3/3/21-3/6/21 S/p cardioversion w/ IV Adenosine. Stable on increased regimen of Toprol.  EP cardiology follow up 3/30/21 at [facility] w/ Dr. Marie Wallace, planning on ablation for recurrent SVT    Hx of complete eye exam 07/06/2017    cataracts OU-return in one year-[place] [address] Hx of mammogram 03/27/2018    [facility]-No findings suspicious for malignancy-Yearly mammogram recommended    Hypercholesterolemia 11/2/2011    Lt Ankle edema, s/p surgery     Mild renal insufficiency 11/2/2011    Stage 3a chronic kidney disease ([facility] [address] 2/28/2013 2012; Dr Db Covarrubias al; Renal US 2/2013; follow up prn    Venous insufficiency of both lower extremities 12/28/2020    Venous stasis dermatitis of both lower extremities 12/28/2020    Vitamin D deficiency 2/28/2013 1/2013; weekly rx vit D per Nephrology      Past Surgical History:   Procedure Laterality Date    CARDIAC CATHETERIZATION  1/27/2012    normal, Dr Cain Salinas, but given NTG just in case to use prn    HX BREAST BIOPSY Left     benign needle years ago    HX BUNIONECTOMY Right     ~2005    HX BUNIONECTOMY Left 12/15/2015    Dr. Asa Barragan HX COLONOSCOPY  06/23/2017    Dr Zettie Collet - follow up in 10 years    HX COLONOSCOPY  06/04/2012    Dr Zettie Collet, ok per pt except diverticulosis, small benign polyp; repeat 5 yrs    HX COLONOSCOPY  ~2000    normal, f/u 10 yrs; Dr Abelardo Deshpande  x2    SAB x 1, AUB x1    HX HEART CATHETERIZATION  12/2019    Mild nonobstructive CAD proximal LAD, [facility] Dr. Sasha Harris    HX KNEE REPLACEMENT  6/2010    Right TKR; Dr Chad Villavicencio    gallstones    HX ORTHOPAEDIC  1990's    left ankle surgery    HX PARTIAL HYSTERECTOMY  ~2000    AUB, benign disease    JARRELL MAMMOGRAM SCREEN BILAT  annually    [facility]     Current Outpatient Medications   Medication Sig Dispense Refill    spironolactone (ALDACTONE) 50 mg tablet TAKE 1 TABLET TWICE DAILY (Patient taking differently: Take 50 mg by mouth daily. ) 180 Tablet 0    metoprolol succinate (TOPROL-XL) 25 mg XL tablet TAKE ONE TABLET BY MOUTH  DAILY FOR HYPERTENSION 90 Tablet 0    isosorbide mononitrate ER (IMDUR) 30 mg tablet Take 30 mg by mouth daily. Per cardio Dr. Cuba Mcgraw triamcinolone acetonide (KENALOG) 0.5 % ointment Apply  to affected area two (2) times a day. Use thin layer to dry, irritated areas on lower legs 45 g 3    TURMERIC PO Take  by mouth daily.  diclofenac (VOLTAREN) 1 % gel Apply 2 g to affected area four (4) times daily as needed. 1 Each 1    cyanocobalamin 1,000 mcg tablet Take 1,000 mcg by mouth daily.  acetaminophen (TYLENOL) 500 mg tablet Take 500 mg by mouth daily as needed for Pain.  cholecalciferol (VITAMIN D3) 1,000 unit tablet Take  by mouth daily.  aspirin 81 mg tablet Take 81 mg by mouth daily.        Allergies   Allergen Reactions    Ace Inhibitors Other (comments)     Renal insufficiency    Hydrochlorothiazide Other (comments)     Stopped d/t renal insufficiency, followed by Nephrologist      Norvasc [Amlodipine] Swelling     Increased leg swelling       Family History   Problem Relation Age of Onset    Hypertension Mother    [place] Arthritis-osteo Mother         B TKR    Arthritis-rheumatoid Mother juvenile    Breast Cancer Mother 80    Other Mother         Diverticulitis    Hypertension Father     Diabetes Father          age 80 kidney failure; dialysis pt    Diabetes Sister     Cancer Brother          age 46 \"bone cancer\"    Alcohol abuse Brother          of cirrhosis age 58    Heart Disease Maternal Grandmother          at 80   [facility] Paternal Grandmother          at 80    Heart Attack Paternal Grandfather          in his 66's   Connye Sole Anesth Problems Neg Hx      Social History     Tobacco Use    Smoking status: Never Smoker    Smokeless tobacco: Never Used   Substance Use Topics    Alcohol use: Yes     Comment: rarely         Albin Aguilar MD

## 2021-11-11 NOTE — PROGRESS NOTES
Chief Complaint   Patient presents with    Annual Wellness Visit     fasting    Cholesterol Problem    Diabetes    Hypertension     1. \"Have you been to the ER, urgent care clinic since your last visit? Hospitalized since your last visit? \" Yes Where: [facility] left wrist pain    2. \"Have you seen or consulted any other health care providers outside of the [address] [place] [address] since your last visit? \" No     3. For patients over 45: Has the patient had a colonoscopy? In system    If the patient is female:    4. For patients over 40: Has the patient had a mammogram? In system  5. For patients over 21: Has the patient had a pap smear?  NA    Due eye exam

## 2021-11-11 NOTE — PROGRESS NOTES
Chief Complaint   Patient presents with    Annual Wellness Visit    Diabetes     Fasting follow up    Cholesterol Problem    Hypertension       HISTORY OF PRESENT ILLNESS   HPI  Fasting follow up Type 2 NIDDM, Hypercholesterolemia, Hypertension, Labs and Medication Check. Complying routinely w/ meds except only takes the Aldactone once daily, no particular reason, just doesn't comply w/ 2nd dose. Chronic ankle edema stable, some days more than others. Not wearing support hose either. Does not check home BS's or BP's. Diet: Nothing special  Exercise: None, just stays active taking care of her , sister and sometimes helps w/ her 6 yo grandson  Caffeine: 1 bottle of 16-20 oz of Pepsi a day  Weight: Staying in the 220's  Generally feeling pretty well. Did go to [facility] about 3-4 weeks ago for left wrist pain. Had xrays done. Diagnosed w/ arthritis and prescribed 10 day prednisone taper. Getting better. REVIEW OF SYMPTOMS   Review of Systems   Constitutional: Negative. Eyes: Negative. Respiratory: Negative. Cardiovascular: Negative for chest pain, palpitations and claudication. Gastrointestinal: Negative. Genitourinary: Negative. Neurological: Negative. Endo/Heme/Allergies: Negative. PROBLEM LIST/MEDICAL HISTORY     Problem List  Date Reviewed: 11/11/2021          Codes Class Noted    History of supraventricular tachycardia ICD-10-CM: Z86.79  ICD-9-CM: V12.59  3/24/2021    Overview Addendum 11/11/2021 10:19 AM by Marta Bustillo MD     [facility] 3/3/21-3/6/21 S/p cardioversion w/ IV Adenosine. Stable on increased regimen of Toprol.  EP cardiology follow up 3/30/21 at [facility] w/ Dr. Ace Lawson, planning on ablation for recurrent SVT             History of non-ST elevation myocardial infarction (NSTEMI) ICD-10-CM: I25.2  ICD-9-CM: 412  3/24/2021    Overview Addendum 11/11/2021 10:18 AM by Marta Bustillo MD     Mild nonobstructive CAD proximal LAD on left heart cath  CJW, Echo mild LVH, EF 60-65%Cardio Dr. Nisa Cabral (left), changed to Dr. Galdino Branch             Venous insufficiency of both lower extremities ICD-10-CM: I87.2  ICD-9-CM: 459.81  12/28/2020        Venous stasis dermatitis of both lower extremities ICD-10-CM: I87.2  ICD-9-CM: 454.1  12/28/2020        History of angina ICD-10-CM: Z86.79  ICD-9-CM: V12.59  9/14/2020    Overview Addendum 11/11/2021 10:18 AM by Meghann Victor MD     [facility] , Cardiac Cath, reportedly mild CAD, Dr. Nisa Cabral (left), changed to Dr. Galdino Branch             Type 2 diabetes with nephropathy [facility]) ICD-10-CM: E11.21  ICD-9-CM: 250.40, 583.81  5/13/2019        ACP (advance care planning) ICD-10-CM: Z71.89  ICD-9-CM: V65.49  5/2/2017    Overview Addendum 5/4/2018 10:52 AM by Meghann Victor MD     Initial ACP discussion 6-2017. AMD form reviewed and copy provided. NN/facilitator to discuss with patient.  Discussion and info again 5-2018               Diabetes mellitus type 2, noninsulin dependent ([facility] [address] ICD-10-CM: E11.9  ICD-9-CM: 250.00  8/15/2016    Overview Signed 8/15/2016 10:36 AM by Meghann Victor MD     5/2012, HgbA1c 6.5             Benign essential hypertension ICD-10-CM: I10  ICD-9-CM: 401.1  5/16/2016    Overview Signed 5/16/2016 11:03 AM by Meghann Victor MD     DX 4/09             Stage 3a chronic kidney disease ([facility] [address] ICD-10-CM: N18.31  ICD-9-CM: 585.3  2/28/2013    Overview Addendum 9/14/2020  9:12 AM by Meghann Victor MD     2012; Dr Cloud Overall al; Renal US 2/2013; follow up prn             Vitamin D deficiency ICD-10-CM: E55.9  ICD-9-CM: 268.9  2/28/2013    Overview Signed 2/28/2013  9:25 AM by Meghann Victor MD     1/2013; weekly rx vit D per Nephrology             Dyspepsia & Heartburn ICD-10-CM: R10.13  ICD-9-CM: 536.8  5/16/2012    Overview Signed 5/16/2012  9:00 AM by Meghann Victor MD     2012, cardiac w/u negative; improved w/ Pepcid Impaired fasting glucose ICD-10-CM: R73.01  ICD-9-CM: 790.21  2012    Overview Signed 2012  9:07 AM by Marilee Victoria MD                  Chest pain, unspecified ICD-10-CM: R07.9  ICD-9-CM: 786.50  2012    Overview Signed 2012  9:00 AM by Marilee Victoria MD     2012, Dr Lydia Doaln; Cardiac cath negative, GI related             Hypercholesterolemia ICD-10-CM: E78.00  ICD-9-CM: 272.0  2011        Bilateral Dependent Foot & Ankle Edema, L>R ICD-10-CM: R60.9  ICD-9-CM: 782.3  2010         (normal spontaneous vaginal delivery) x3 ICD-10-CM: O80  ICD-9-CM: 650  Unknown    Overview Signed 5/3/2010 10:46 AM by Marilee Victoria MD      x4, 1 fetal demise after delivery at 11mos             Miscarriage x 2 ICD-10-CM: O03.9  ICD-9-CM: 634.90  Unknown        S/P D&C (status post dilation and curettage) ICD-10-CM: Q46.909  ICD-9-CM: V45.89  Unknown    Overview Signed 2010 12:08 PM by Marilee Victoria MD     X1, SAB  x1, AUB             S/p laparoscopic cholecystectomy gallstones ICD-10-CM: Z90.49  ICD-9-CM: V45.89  Unknown    Overview Signed 5/3/2010 10:48 AM by Marilee Victoria MD                  S/p Rt bunionectomy ICD-10-CM: Z98.890  ICD-9-CM: V45.89  Unknown        Lt Ankle edema, s/p surgery ICD-10-CM: M25.473  ICD-9-CM: 719.07  Unknown    Overview Signed 5/3/2010 10:49 AM by Marilee Victoria MD     Early 4800'B                       PAST SURGICAL HISTORY     Past Surgical History:   Procedure Laterality Date    CARDIAC CATHETERIZATION  2012    normal, Dr Lydia Dolan, but given NTG just in case to use prn    HX BREAST BIOPSY Left     benign needle years ago    HX BUNIONECTOMY Right     ~2005    HX BUNIONECTOMY Left 12/15/2015    Dr. Jessie Kang HX COLONOSCOPY  2017    Dr Augustina Fountain - follow up in 10 years    HX COLONOSCOPY  2012    Dr Augustina Fountain, ok per pt except diverticulosis, small benign polyp; repeat 5 yrs    HX COLONOSCOPY  ~2000    normal, f/u 10 yrs; Dr Mel Polo  x2    SAB x 1, AUB x1    HX HEART CATHETERIZATION  12/2019    Mild nonobstructive CAD proximal LAD, [facility] Dr. Caroline Mann    HX KNEE REPLACEMENT  6/2010    Right TKR; Dr Zenaida Bae    gallstones    HX ORTHOPAEDIC  1990's    left ankle surgery    HX PARTIAL HYSTERECTOMY  ~2000    AUB, benign disease    JARRELL MAMMOGRAM SCREEN BILAT  annually    [facility]         MEDICATIONS     Current Outpatient Medications   Medication Sig    spironolactone (ALDACTONE) 50 mg tablet TAKE 1 TABLET TWICE DAILY (Patient taking differently: Take 50 mg by mouth daily.)    metoprolol succinate (TOPROL-XL) 25 mg XL tablet TAKE ONE TABLET BY MOUTH  DAILY FOR HYPERTENSION    isosorbide mononitrate ER (IMDUR) 30 mg tablet Take 30 mg by mouth daily. Per cardio Dr. Kenyatta Florentino triamcinolone acetonide (KENALOG) 0.5 % ointment Apply  to affected area two (2) times a day. Use thin layer to dry, irritated areas on lower legs    TURMERIC PO Take  by mouth daily.  diclofenac (VOLTAREN) 1 % gel Apply 2 g to affected area four (4) times daily as needed.  cyanocobalamin 1,000 mcg tablet Take 1,000 mcg by mouth daily.  acetaminophen (TYLENOL) 500 mg tablet Take 500 mg by mouth daily as needed for Pain.  cholecalciferol (VITAMIN D3) 1,000 unit tablet Take  by mouth daily.  aspirin 81 mg tablet Take 81 mg by mouth daily. No current facility-administered medications for this visit.           ALLERGIES     Allergies   Allergen Reactions    Ace Inhibitors Other (comments)     Renal insufficiency    Hydrochlorothiazide Other (comments)     Stopped d/t renal insufficiency, followed by Nephrologist      Norvasc [Amlodipine] Swelling     Increased leg swelling          SOCIAL HISTORY     Social History     Tobacco Use    Smoking status: Never Smoker    Smokeless tobacco: Never Used Substance Use Topics    Alcohol use: Yes     Comment: rarely     Social History     Social History Narrative    , 3 children    Lives at home w/ her  in their trilevel home. Moved her elderly sister in w/ them.     Taking care of 9yo grandson whose mother  2019    Retired from [address] [place] Nothing special    Exercise: No    Caffeine: 1 bottle of 16-20 oz of Pepsi a day    Weight: Staying in the 220's         Social History     Substance and Sexual Activity   Sexual Activity Yes    Partners: Male       IMMUNIZATIONS     Immunization History   Administered Date(s) Administered    COVID-19, Greg Deist, Primary or Immunocompromised Series, MRNA, PF, 100mcg/0.5mL 2021, 2021, 2021    Influenza High Dose Vaccine PF 2015, 10/02/2017, 10/05/2018    Influenza, High-dose, Quadrivalent (>65 Yrs Fluzone High Dose Quad 40057) 2020    Influenza, Quadrivalent, Adjuvanted (>65 Yrs FLUAD QUAD [id number]) 2021    Pneumococcal Conjugate (PCV-13) 2018    Pneumococcal Polysaccharide (PPSV-23) 2010    Tdap 2020    Zoster 10/12/2012         FAMILY HISTORY     Family History   Problem Relation Age of Onset    Hypertension Mother     Arthritis-osteo Mother         B TKR   [facility] Arthritis-rheumatoid Mother         juvenile    Breast Cancer Mother 80    Other Mother         Diverticulitis    Hypertension Father    [facility] Diabetes Father          age 80 kidney failure; dialysis pt    Diabetes Sister     Cancer Brother          age 46 \"bone cancer\"    Alcohol abuse Brother          of cirrhosis age 58    Heart Disease Maternal Grandmother          at 80    Heart Disease Paternal Grandmother          at 80    Heart Attack Paternal Grandfather          in his 66's   [facility] Anesth Problems Neg Hx          VITALS     Visit Vitals  /84 (BP 1 Location: Left upper arm, BP Patient Position: Sitting, BP Cuff Size: Large adult)   Pulse 80   Temp 97.9 °F (36.6 °C) (Oral)   Resp 16   Ht 5' 5\" (1.651 m)   Wt 220 lb 3.2 oz (99.9 kg)   LMP 07/25/2000   SpO2 97%   BMI 36.64 kg/m²          PHYSICAL EXAMINATION   Physical Exam  Vitals reviewed. Constitutional:       General: She is not in acute distress. HENT:      Right Ear: Tympanic membrane and ear canal normal.      Left Ear: Tympanic membrane and ear canal normal.   Eyes:      General: No scleral icterus. Neck:      Thyroid: No thyroid mass, thyromegaly or thyroid tenderness. Vascular: No carotid bruit. Cardiovascular:      Rate and Rhythm: Normal rate and regular rhythm. Pulses:           Dorsalis pedis pulses are 2+ on the right side and 2+ on the left side. Posterior tibial pulses are 2+ on the right side and 2+ on the left side. Heart sounds: Normal heart sounds. No murmur heard. Pulmonary:      Effort: Pulmonary effort is normal.      Breath sounds: Normal breath sounds. Abdominal:      General: There is no distension. Palpations: Abdomen is soft. There is no mass. Tenderness: There is no abdominal tenderness. Musculoskeletal:      Cervical back: Neck supple. Comments: B ankle edema, stable. No pitting. Skin normal. No lesions or rashes. Feet:      Right foot:      Protective Sensation: 5 sites tested. 5 sites sensed. Skin integrity: Skin integrity normal.      Left foot:      Protective Sensation: 5 sites tested. 5 sites sensed. Skin integrity: Skin integrity normal.      Comments: Toenails pedicured and polished. Lymphadenopathy:      Cervical: No cervical adenopathy. Skin:     General: Skin is warm and dry. Neurological:      Mental Status: She is alert.                 LABORATORY DATA/ANCILLARY/IMAGING         Lab Results   Component Value Date/Time    Hemoglobin A1c 6.1 (H) 01/18/2021 11:54 AM    Hemoglobin A1c 6.2 (H) 11/05/2019 09:12 AM    Hemoglobin A1c 6.4 (H) 05/13/2019 03:20 PM    Glucose 90 03/15/2021 10:19 AM    Microalb/Creat ratio (ug/mg creat.) 19 09/14/2020 10:46 AM    LDL, calculated 114 (H) 09/14/2020 10:46 AM    LDL, calculated 104 (H) 11/05/2019 09:12 AM    Creatinine 1.06 (H) 03/15/2021 10:19 AM      Lab Results   Component Value Date/Time    Cholesterol, total 190 09/14/2020 10:46 AM    HDL Cholesterol 55 09/14/2020 10:46 AM    LDL, calculated 114 (H) 09/14/2020 10:46 AM    LDL, calculated 104 (H) 11/05/2019 09:12 AM    Triglyceride 120 09/14/2020 10:46 AM    CHOL/HDL Ratio 3.6 05/03/2010 11:36 AM         ASSESSMENT & PLAN   Diagnoses and all orders for this visit:    1. Medicare annual wellness visit, subsequent  See separate note under this same encounter visit for Medicare Wellness note. 2. Encounter for immunization  -     ADMIN INFLUENZA VIRUS VAC    3. Needs flu shot  -     FLU (FLUAD QUAD INFLUENZA VACCINE,QUAD,ADJUVANTED)    4. Type 2 diabetes mellitus with stage 3a chronic kidney disease, without long-term current use of insulin ([facility] [address] A1c has been at diabetic goal and kidney function has remained stable and in baseline range; cannot take ACEI or ARB's  -     CBC W/O DIFF; Future  -     METABOLIC PANEL, COMPREHENSIVE; Future  -     LIPID PANEL; Future  -     HEMOGLOBIN A1C WITH EAG; Future  -     MICROALBUMIN, UR, RAND W/ MICROALB/CREAT RATIO; Future  -      DIABETES FOOT EXAM: Performed in office today  -      DIABETES EYE EXAM: Patient scheduling. Eye exam form given to take to appt    5. Hypercholesterolemia  -     METABOLIC PANEL, COMPREHENSIVE; Future  -     LIPID PANEL; Future  -     TSH 3RD GENERATION; Future   High ASCVD Risk Score and should be on statin. Will continue to try to encourage trial of statin therapy as I know her  cardiologist would be in support of this. 6. Benign essential hypertension, stable  -     METABOLIC PANEL, COMPREHENSIVE; Future  -     LIPID PANEL; Future  -     TSH 3RD GENERATION; Future    7.  History of non-ST elevation myocardial infarction (NSTEMI)  Followed by cardiologist q 6 months, next appt coming up in one month  Asymptomatic and stable on current medication regimen    8. Venous insufficiency of both lower extremities, stable  Increase Aldactone back up to bid, encouraged to use knee high support hose, KVNG diet, work on exercise and weight reduction    9. BMI 36.0-36.9,adult  Reviewed diet, nutrition, exercise, weight management, BMI/goals. Fasting labs checked today  Cardiovascular risk and specific lipid/LDL/BP/A1c goals reviewed  Age/risk based screening recommendations, health maintenance & prevention counseling. Cancer screening USPTFS guidelines reviewed w/ pt today. Discussed benefits/positive/negative outcomes of screening based on age/risk stratification. Informed consent for/against screening based on pt's personal hx/risk factors. Updated in history above and health maintenance. Reviewed medications and side effects  Further follow up & other recommendations pending review of labs.  If all remains good and stable, follow up in 6 months  Total Time: 30 \" including counseling re: all the above

## 2021-11-11 NOTE — PATIENT INSTRUCTIONS
Medicare Wellness Visit, Female     The best way to live healthy is to have a lifestyle where you eat a well-balanced diet, exercise regularly, limit alcohol use, and quit all forms of tobacco/nicotine, if applicable. Regular preventive services are another way to keep healthy. Preventive services (vaccines, screening tests, monitoring & exams) can help personalize your care plan, which helps you manage your own care. Screening tests can find health problems at the earliest stages, when they are easiest to treat. [facility] follows the current, evidence-based guidelines published by the [facility] Eulogio Sheehan ([facility]) when recommending preventive services for our patients. Because we follow these guidelines, sometimes recommendations change over time as research supports it. (For example, mammograms used to be recommended annually. Even though Medicare will still pay for an annual mammogram, the newer guidelines recommend a mammogram every two years for women of average risk). Of course, you and your doctor may decide to screen more often for some diseases, based on your risk and your co-morbidities (chronic disease you are already diagnosed with). Preventive services for you include:  - Medicare offers their members a free annual wellness visit, which is time for you and your primary care provider to discuss and plan for your preventive service needs. Take advantage of this benefit every year!  -All adults over the age of 72 should receive the recommended pneumonia vaccines. Current USPSTF guidelines recommend a series of two vaccines for the best pneumonia protection.   -All adults should have a flu vaccine yearly and a tetanus vaccine every 10 years.   -All adults age 48 and older should receive the shingles vaccines (series of two vaccines).       -All adults age 38-68 who are overweight should have a diabetes screening test once every three years.   -All adults born between 80 and 1965 should be screened once for Hepatitis C.  -Other screening tests and preventive services for persons with diabetes include: an eye exam to screen for diabetic retinopathy, a kidney function test, a foot exam, and stricter control over your cholesterol.   -Cardiovascular screening for adults with routine risk involves an electrocardiogram (ECG) at intervals determined by your doctor.   -Colorectal cancer screenings should be done for adults age 54-65 with no increased risk factors for colorectal cancer. There are a number of acceptable methods of screening for this type of cancer. Each test has its own benefits and drawbacks. Discuss with your doctor what is most appropriate for you during your annual wellness visit. The different tests include: colonoscopy (considered the best screening method), a fecal occult blood test, a fecal DNA test, and sigmoidoscopy.    -A bone mass density test is recommended when a woman turns 65 to screen for osteoporosis. This test is only recommended one time, as a screening. Some providers will use this same test as a disease monitoring tool if you already have osteoporosis. -Breast cancer screenings are recommended every other year for women of normal risk, age 54-69.  -Cervical cancer screenings for women over age 72 are only recommended with certain risk factors.      Here is a list of your current Health Maintenance items (your personalized list of preventive services) with a due date:  Health Maintenance Due   Topic Date Due    Eye Exam  01/23/2021    Hemoglobin A1C    07/18/2021    Diabetic Foot Care  09/14/2021    Albumin Urine Test  09/14/2021    Cholesterol Test   09/14/2021

## 2021-11-12 LAB
ALBUMIN SERPL-MCNC: 3.7 G/DL (ref 3.5–5)
ALBUMIN/GLOB SERPL: 1.1 {RATIO} (ref 1.1–2.2)
ALP SERPL-CCNC: 113 U/L (ref 45–117)
ALT SERPL-CCNC: 18 U/L (ref 12–78)
ANION GAP SERPL CALC-SCNC: 5 MMOL/L (ref 5–15)
AST SERPL-CCNC: 15 U/L (ref 15–37)
BILIRUB SERPL-MCNC: 0.5 MG/DL (ref 0.2–1)
BUN SERPL-MCNC: 12 MG/DL (ref 6–20)
BUN/CREAT SERPL: 11 (ref 12–20)
CALCIUM SERPL-MCNC: 10 MG/DL (ref 8.5–10.1)
CHLORIDE SERPL-SCNC: 107 MMOL/L (ref 97–108)
CHOLEST SERPL-MCNC: 179 MG/DL
CO2 SERPL-SCNC: 29 MMOL/L (ref 21–32)
CREAT SERPL-MCNC: 1.08 MG/DL (ref 0.55–1.02)
CREAT UR-MCNC: 120 MG/DL
ERYTHROCYTE [DISTWIDTH] IN BLOOD BY AUTOMATED COUNT: 14.2 % (ref 11.5–14.5)
EST. AVERAGE GLUCOSE BLD GHB EST-MCNC: 134 MG/DL
GLOBULIN SER CALC-MCNC: 3.5 G/DL (ref 2–4)
GLUCOSE SERPL-MCNC: 113 MG/DL (ref 65–100)
HBA1C MFR BLD: 6.3 % (ref 4–5.6)
HCT VFR BLD AUTO: 41.8 % (ref 35–47)
HDLC SERPL-MCNC: 57 MG/DL
HDLC SERPL: 3.1 {RATIO} (ref 0–5)
HGB BLD-MCNC: 12.8 G/DL (ref 11.5–16)
LDLC SERPL CALC-MCNC: 103.4 MG/DL (ref 0–100)
MCH RBC QN AUTO: 30 PG (ref 26–34)
MCHC RBC AUTO-ENTMCNC: 30.6 G/DL (ref 30–36.5)
MCV RBC AUTO: 97.9 FL (ref 80–99)
MICROALBUMIN UR-MCNC: 3.1 MG/DL
MICROALBUMIN/CREAT UR-RTO: 26 MG/G (ref 0–30)
NRBC # BLD: 0 K/UL (ref 0–0.01)
NRBC BLD-RTO: 0 PER 100 WBC
PLATELET # BLD AUTO: 284 K/UL (ref 150–400)
PMV BLD AUTO: 10.1 FL (ref 8.9–12.9)
POTASSIUM SERPL-SCNC: 4.7 MMOL/L (ref 3.5–5.1)
PROT SERPL-MCNC: 7.2 G/DL (ref 6.4–8.2)
RBC # BLD AUTO: 4.27 M/UL (ref 3.8–5.2)
SODIUM SERPL-SCNC: 141 MMOL/L (ref 136–145)
TRIGL SERPL-MCNC: 93 MG/DL (ref ?–150)
TSH SERPL DL<=0.05 MIU/L-ACNC: 0.9 UIU/ML (ref 0.36–3.74)
VLDLC SERPL CALC-MCNC: 18.6 MG/DL
WBC # BLD AUTO: 9.2 K/UL (ref 3.6–11)

## 2021-11-14 ENCOUNTER — TELEPHONE (OUTPATIENT)
Dept: FAMILY MEDICINE CLINIC | Age: 76
End: 2021-11-14

## 2021-11-15 DIAGNOSIS — R60.9 DEPENDENT EDEMA: ICD-10-CM

## 2021-11-15 DIAGNOSIS — I10 BENIGN ESSENTIAL HYPERTENSION: ICD-10-CM

## 2021-11-15 DIAGNOSIS — I87.2 VENOUS INSUFFICIENCY OF BOTH LOWER EXTREMITIES: ICD-10-CM

## 2021-11-16 RX ORDER — SPIRONOLACTONE 50 MG/1
TABLET, FILM COATED ORAL
Qty: 180 TABLET | Refills: 1 | Status: SHIPPED | OUTPATIENT
Start: 2021-11-16 | End: 2022-04-18

## 2021-12-08 ENCOUNTER — TRANSCRIBE ORDER (OUTPATIENT)
Dept: SCHEDULING | Age: 76
End: 2021-12-08

## 2021-12-08 DIAGNOSIS — Z12.31 SCREENING MAMMOGRAM FOR HIGH-RISK PATIENT: Primary | ICD-10-CM

## 2021-12-24 DIAGNOSIS — Z86.79 HISTORY OF ANGINA: ICD-10-CM

## 2021-12-24 DIAGNOSIS — I47.1 SUPRAVENTRICULAR TACHYCARDIA (HCC): ICD-10-CM

## 2021-12-24 DIAGNOSIS — I10 BENIGN ESSENTIAL HYPERTENSION: ICD-10-CM

## 2021-12-24 DIAGNOSIS — I25.2 HISTORY OF NON-ST ELEVATION MYOCARDIAL INFARCTION (NSTEMI): ICD-10-CM

## 2021-12-24 RX ORDER — METOPROLOL SUCCINATE 25 MG/1
TABLET, EXTENDED RELEASE ORAL
Qty: 90 TABLET | Refills: 1 | Status: SHIPPED | OUTPATIENT
Start: 2021-12-24 | End: 2022-05-11

## 2022-01-06 ENCOUNTER — HOSPITAL ENCOUNTER (OUTPATIENT)
Dept: MAMMOGRAPHY | Age: 77
Discharge: HOME | End: 2022-01-06
Attending: FAMILY MEDICINE
Payer: MEDICARE

## 2022-01-06 DIAGNOSIS — Z12.31 SCREENING MAMMOGRAM FOR HIGH-RISK PATIENT: ICD-10-CM

## 2022-01-06 PROCEDURE — 77063 BREAST TOMOSYNTHESIS BI: CPT

## 2022-03-18 PROBLEM — N18.31 TYPE 2 DIABETES MELLITUS WITH STAGE 3A CHRONIC KIDNEY DISEASE, WITHOUT LONG-TERM CURRENT USE OF INSULIN (HCC): Status: ACTIVE | Noted: 2021-11-11

## 2022-03-18 PROBLEM — E11.22 TYPE 2 DIABETES MELLITUS WITH STAGE 3A CHRONIC KIDNEY DISEASE, WITHOUT LONG-TERM CURRENT USE OF INSULIN (HCC): Status: ACTIVE | Noted: 2021-11-11

## 2022-03-19 PROBLEM — Z71.89 ACP (ADVANCE CARE PLANNING): Status: ACTIVE | Noted: 2017-05-02

## 2022-03-19 PROBLEM — I87.2 VENOUS STASIS DERMATITIS OF BOTH LOWER EXTREMITIES: Status: ACTIVE | Noted: 2020-12-28

## 2022-03-19 PROBLEM — I87.2 VENOUS INSUFFICIENCY OF BOTH LOWER EXTREMITIES: Status: ACTIVE | Noted: 2020-12-28

## 2022-03-19 PROBLEM — Z86.79 HISTORY OF SUPRAVENTRICULAR TACHYCARDIA: Status: ACTIVE | Noted: 2021-03-24

## 2022-03-19 PROBLEM — Z86.79 HISTORY OF ANGINA: Status: ACTIVE | Noted: 2020-09-14

## 2022-03-20 PROBLEM — I25.2 HISTORY OF NON-ST ELEVATION MYOCARDIAL INFARCTION (NSTEMI): Status: ACTIVE | Noted: 2021-03-24

## 2022-04-18 DIAGNOSIS — I87.2 VENOUS INSUFFICIENCY OF BOTH LOWER EXTREMITIES: ICD-10-CM

## 2022-04-18 DIAGNOSIS — R60.9 DEPENDENT EDEMA: ICD-10-CM

## 2022-04-18 DIAGNOSIS — I10 BENIGN ESSENTIAL HYPERTENSION: ICD-10-CM

## 2022-04-18 RX ORDER — SPIRONOLACTONE 50 MG/1
TABLET, FILM COATED ORAL
Qty: 180 TABLET | Refills: 1 | Status: SHIPPED | OUTPATIENT
Start: 2022-04-18 | End: 2022-09-13 | Stop reason: SDUPTHER

## 2022-05-27 ENCOUNTER — TELEPHONE (OUTPATIENT)
Dept: FAMILY MEDICINE CLINIC | Age: 77
End: 2022-05-27

## 2022-05-27 NOTE — TELEPHONE ENCOUNTER
----- Message from Primitivo Henriquez sent at 5/27/2022 11:55 AM EDT -----  Subject: Message to Provider    QUESTIONS  Information for Provider? pt said that she received a call to make a AWV   for June, Her last oe was in November last year. Please call to clarify.   -  Preferred Call Back Phone Number? [phone]  -  Called pt to let her know that she is correct her AWV is in Nov.  Dr Michelle Villalta wanted to see her for her 6 month f/u.   I have her scheduled for 6/21 @ 10:00

## 2022-06-23 ENCOUNTER — OFFICE VISIT (OUTPATIENT)
Dept: FAMILY MEDICINE CLINIC | Age: 77
End: 2022-06-23
Payer: MEDICARE

## 2022-06-23 VITALS
HEIGHT: 65 IN | TEMPERATURE: 97.7 F | HEART RATE: 79 BPM | WEIGHT: 222 LBS | SYSTOLIC BLOOD PRESSURE: 132 MMHG | DIASTOLIC BLOOD PRESSURE: 80 MMHG | BODY MASS INDEX: 36.99 KG/M2 | RESPIRATION RATE: 16 BRPM | OXYGEN SATURATION: 98 %

## 2022-06-23 DIAGNOSIS — I25.2 HISTORY OF NON-ST ELEVATION MYOCARDIAL INFARCTION (NSTEMI): ICD-10-CM

## 2022-06-23 DIAGNOSIS — E11.22 TYPE 2 DIABETES MELLITUS WITH STAGE 3A CHRONIC KIDNEY DISEASE, WITHOUT LONG-TERM CURRENT USE OF INSULIN (HCC): Primary | ICD-10-CM

## 2022-06-23 DIAGNOSIS — N18.31 TYPE 2 DIABETES MELLITUS WITH STAGE 3A CHRONIC KIDNEY DISEASE, WITHOUT LONG-TERM CURRENT USE OF INSULIN (HCC): Primary | ICD-10-CM

## 2022-06-23 DIAGNOSIS — I10 BENIGN ESSENTIAL HYPERTENSION: ICD-10-CM

## 2022-06-23 DIAGNOSIS — E78.00 HYPERCHOLESTEROLEMIA: ICD-10-CM

## 2022-06-23 PROBLEM — I47.10 SUPRAVENTRICULAR TACHYCARDIA: Status: ACTIVE | Noted: 2022-06-23

## 2022-06-23 PROBLEM — I47.1 SUPRAVENTRICULAR TACHYCARDIA (HCC): Status: RESOLVED | Noted: 2022-06-23 | Resolved: 2022-06-23

## 2022-06-23 PROBLEM — I47.10 SUPRAVENTRICULAR TACHYCARDIA: Status: RESOLVED | Noted: 2022-06-23 | Resolved: 2022-06-23

## 2022-06-23 PROBLEM — I47.1 SUPRAVENTRICULAR TACHYCARDIA (HCC): Status: ACTIVE | Noted: 2022-06-23

## 2022-06-23 LAB — HBA1C MFR BLD HPLC: 6 %

## 2022-06-23 PROCEDURE — 1090F PRES/ABSN URINE INCON ASSESS: CPT | Performed by: FAMILY MEDICINE

## 2022-06-23 PROCEDURE — G8417 CALC BMI ABV UP PARAM F/U: HCPCS | Performed by: FAMILY MEDICINE

## 2022-06-23 PROCEDURE — G8752 SYS BP LESS 140: HCPCS | Performed by: FAMILY MEDICINE

## 2022-06-23 PROCEDURE — G8510 SCR DEP NEG, NO PLAN REQD: HCPCS | Performed by: FAMILY MEDICINE

## 2022-06-23 PROCEDURE — 3044F HG A1C LEVEL LT 7.0%: CPT | Performed by: FAMILY MEDICINE

## 2022-06-23 PROCEDURE — 83036 HEMOGLOBIN GLYCOSYLATED A1C: CPT | Performed by: FAMILY MEDICINE

## 2022-06-23 PROCEDURE — G8754 DIAS BP LESS 90: HCPCS | Performed by: FAMILY MEDICINE

## 2022-06-23 PROCEDURE — G8427 DOCREV CUR MEDS BY ELIG CLIN: HCPCS | Performed by: FAMILY MEDICINE

## 2022-06-23 PROCEDURE — 1123F ACP DISCUSS/DSCN MKR DOCD: CPT | Performed by: FAMILY MEDICINE

## 2022-06-23 PROCEDURE — 99213 OFFICE O/P EST LOW 20 MIN: CPT | Performed by: FAMILY MEDICINE

## 2022-06-23 PROCEDURE — G8399 PT W/DXA RESULTS DOCUMENT: HCPCS | Performed by: FAMILY MEDICINE

## 2022-06-23 PROCEDURE — 1101F PT FALLS ASSESS-DOCD LE1/YR: CPT | Performed by: FAMILY MEDICINE

## 2022-06-23 PROCEDURE — G8536 NO DOC ELDER MAL SCRN: HCPCS | Performed by: FAMILY MEDICINE

## 2022-06-23 NOTE — PROGRESS NOTES
Chief Complaint   Patient presents with    Diabetes     6 month follow up     1. \"Have you been to the ER, urgent care clinic since your last visit? Hospitalized since your last visit? \" No    2. \"Have you seen or consulted any other health care providers outside of the [address] [place] [address] since your last visit? \" Yes Where: eye exam [facility] on [address]     3. For patients aged 39-70: Has the patient had a colonoscopy / FIT/ Cologuard? 6/2017 Dr Marzena Barakat - follow up in 10 years      If the patient is female:    3. For patients aged 41-77: Has the patient had a mammogram within the past 2 years? 1/2022 in system      5. For patients aged 21-65: Has the patient had a pap smear?  NA

## 2022-06-23 NOTE — ASSESSMENT & PLAN NOTE
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 Julia Catherine (left), changed to Dr. Regina Acevedo. Followed annually. Next upcoming appointment 8/2022. Stable. Asymptomatic. No changes at this time.

## 2022-06-23 NOTE — PROGRESS NOTES
Chief Complaint   Patient presents with    Diabetes     6 month follow up    Hypertension       HISTORY OF PRESENT ILLNESS   HPI  6 month follow up Type 2 NIDDM, Hypertension, medication check. Complying routinely w/ medications updated below. Tolerating w/o reaction or side effects. Does not monitor BS's or home BP's. In general getting along pretty well. No new changes or concerns at this time. Diet: Nothing special  Exercise: No  Caffeine: 1 bottle of 16-20 oz of Pepsi a day  Weight: Staying in the 220's   REVIEW OF SYMPTOMS   Review of Systems   Constitutional: Negative. Respiratory: Negative. Cardiovascular: Negative. Gastrointestinal: Negative. Neurological: Negative. Endo/Heme/Allergies: Negative. PROBLEM LIST/MEDICAL HISTORY     Problem List  Date Reviewed: 6/23/2022          Codes Class Noted    Type 2 diabetes mellitus with stage 3a chronic kidney disease, without long-term current use of insulin ([facility] ICD-10-CM: E11.22, N18.31  ICD-9-CM: 250.40, 585.3  11/11/2021    Overview Addendum 11/11/2021 10:35 AM by Sarthak Murrieta MD     Diagnosed Type 2 NIDDM 5/2012, HgbA1c 6.5; CKD 2012; Dr Victoria Rota al; Renal US 2/2013; follow up prn             History of supraventricular tachycardia ICD-10-CM: Z86.79  ICD-9-CM: V12.59  3/24/2021    Overview Addendum 11/11/2021 10:19 AM by Sarthak Murrieta MD     Noosh 3/3/21-3/6/21 S/p cardioversion w/ IV Adenosine. Stable on increased regimen of Toprol.  EP cardiology follow up 3/30/21 at [facility] w/ Dr. 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 Rowdy Perry (left), changed to Dr. Barragan Line             Venous insufficiency of both lower extremities ICD-10-CM: I87.2  ICD-9-CM: 459.81  2020        Venous stasis dermatitis of both lower extremities ICD-10-CM: I87.2  ICD-9-CM: 454.1  2020        History of angina ICD-10-CM: Z86.79  ICD-9-CM: V12.59  2020    Overview Addendum 2022 10:38 AM by Aaron Shelton MD     2012, Dr Tony López; Cardiac cath negative, GI related; [facility] , Cardiac Cath, reportedly mild CAD, Dr. Davy Navarrete (left), changed to Dr. Jaime Corley             ACP (advance care planning) ICD-10-CM: Z71.89  ICD-9-CM: V65.49  2017    Overview Addendum 2018 10:52 AM by Aaron Shelton MD     Initial ACP discussion [phone]. AMD form reviewed and copy provided. NN/facilitator to discuss with patient.  Discussion and info again                Benign essential hypertension ICD-10-CM: I10  ICD-9-CM: 401.1  2016    Overview Signed 2016 11:03 AM by Aaron Shelton MD     DX              Vitamin D deficiency ICD-10-CM: E55.9  ICD-9-CM: 268.9  2013    Overview Signed 2013  9:25 AM by Aaron Shelton MD     2013; weekly rx vit D per Nephrology             Dyspepsia & Heartburn ICD-10-CM: R10.13  ICD-9-CM: 536.8  2012    Overview Signed 2012  9:00 AM by Aaron Shelton MD     , cardiac w/u negative; improved w/ Pepcid             Hypercholesterolemia ICD-10-CM: E78.00  ICD-9-CM: 272.0  2011        Bilateral Dependent Foot & Ankle Edema, L>R ICD-10-CM: R60.9  ICD-9-CM: 782.3  2010         (normal spontaneous vaginal delivery) x3 ICD-10-CM: O80  ICD-9-CM: 650  Unknown    Overview Addendum 2022 10:33 AM by MD JEFRY Matt x 2;  x4, but 1 fetal demise after delivery at 7mos             S/P D&C (status post dilation and curettage) ICD-10-CM: L79.578  ICD-9-CM: V45.89  Unknown    Overview Signed 2010 12:08 PM by Aaron Shelton MD     X1, SAB  x1, AUB             S/p laparoscopic cholecystectomy gallstones ICD-10-CM: Z90.49  ICD-9-CM: V45.89  Unknown    Overview Signed 5/3/2010 10:48 AM by Renetta Pelaez MD     1999             S/p Rt bunionectomy ICD-10-CM: Z98.890  ICD-9-CM: V45.89  Unknown        Lt Ankle edema, s/p surgery ICD-10-CM: M25.473  ICD-9-CM: 719.07  Unknown    Overview Signed 5/3/2010 10:49 AM by Renetta Pelaez MD     Early 7676'H                       PAST SURGICAL HISTORY     Past Surgical History:   Procedure Laterality Date    CARDIAC CATHETERIZATION  1/27/2012    normal, Dr Louis Box, but given NTG just in case to use prn    HX BREAST BIOPSY Left     benign needle years ago    HX BUNIONECTOMY Right     ~2005    [facility]  06/23/2017    Dr Santos Amanda - follow up in 10 years    HX COLONOSCOPY  06/04/2012    Dr Santos Amanda, ok per pt except diverticulosis, small benign polyp; repeat 5 yrs    HX COLONOSCOPY  ~2000    normal, f/u 10 yrs; Dr Aneesh Delong  x2    SAB x 1, AUB x1    HX HEART CATHETERIZATION  12/2019    Mild nonobstructive CAD proximal LAD, [facility] Dr. Avi Fox    HX KNEE REPLACEMENT  6/2010    Right TKR; Dr Asif Hawley    gallstones    HX ORTHOPAEDIC  1990's    left ankle surgery    HX PARTIAL HYSTERECTOMY  ~2000    AUB, benign disease    JARRELL MAMMOGRAM SCREEN BILAT  annually    [facility]         MEDICATIONS     Current Outpatient Medications   Medication Sig    metoprolol succinate (TOPROL-XL) 25 mg XL tablet TAKE ONE TABLET BY MOUTH  DAILY FOR HYPERTENSION    spironolactone (ALDACTONE) 50 mg tablet TAKE 1 TABLET TWICE DAILY    isosorbide mononitrate ER (IMDUR) 30 mg tablet Take 30 mg by mouth daily. Per cardio    triamcinolone acetonide (KENALOG) 0.5 % ointment Apply  to affected area two (2) times a day. Use thin layer to dry, irritated areas on lower legs    TURMERIC PO Take  by mouth daily.     diclofenac (VOLTAREN) 1 % gel Apply 2 g to affected area four (4) times daily as needed.  acetaminophen (TYLENOL) 500 mg tablet Take 500 mg by mouth daily as needed for Pain.  cholecalciferol (VITAMIN D3) 1,000 unit tablet Take  by mouth daily.  aspirin 81 mg tablet Take 81 mg by mouth daily.  cyanocobalamin 1,000 mcg tablet Take 1,000 mcg by mouth daily. (Patient not taking: Reported on 2022)     No current facility-administered medications for this visit. ALLERGIES     Allergies   Allergen Reactions    Ace Inhibitors Other (comments)     Renal insufficiency    Hydrochlorothiazide Other (comments)     Stopped d/t renal insufficiency, followed by Nephrologist      Norvasc [Amlodipine] Swelling     Increased leg swelling          SOCIAL HISTORY     Social History     Tobacco Use    Smoking status: Never Smoker    Smokeless tobacco: Never Used   Substance Use Topics    Alcohol use: Yes     Comment: rarely     Social History     Social History Narrative    , 3 sons, 4 grandchildren    Lives at home w/ her  in their trilevel home. Moved her elderly sister in w/ them.     Taking care of 9yo grandson whose mother  2019    Retired from [address] at [address]: Nothing special    Exercise: No    Caffeine: 1 bottle of 16-20 oz of Pepsi a day    Weight: Staying in the 220's         Social History     Substance and Sexual Activity   Sexual Activity Yes    Partners: Male       IMMUNIZATIONS     Immunization History   Administered Date(s) Administered    Kevin PERDOMO, Primary or Immunocompromised Series, MRNA, PF, 100mcg/0.5mL 2021, 2021, 2021    Influenza High Dose Vaccine PF 2015, 10/02/2017, 10/05/2018    Influenza, High-dose, Quadrivalent (>65 Yrs Fluzone High Dose Quad [id number]) 2020    Influenza, Quadrivalent, Adjuvanted (>65 Yrs FLUAD QUAD [id number]) 2021    Pneumococcal Conjugate (PCV-13) 2018    Pneumococcal Polysaccharide (PPSV-23) 2010    Tdap 2020    Zoster 10/12/2012         FAMILY HISTORY     Family History   Problem Relation Age of Onset    Hypertension Mother     OSTEOARTHRITIS Mother         B TKR   [facility] Arthritis-rheumatoid Mother         juvenile    Breast Cancer Mother 80    Other Mother         Diverticulitis    Hypertension Father    [facility] Diabetes Father          age 80 kidney failure; dialysis pt    Diabetes Sister     Cancer Brother          age 46 \"bone cancer\"    Alcohol abuse Brother          of cirrhosis age 58    Heart Disease Maternal Grandmother          at 80    Heart Disease Paternal Grandmother          at 80    Heart Attack Paternal Grandfather          in his 66's   [facility] Anesth Problems Neg Hx          VITALS     Visit Vitals  /80 (BP 1 Location: Left upper arm, BP Patient Position: Sitting, BP Cuff Size: Large adult)   Pulse 79   Temp 97.7 °F (36.5 °C) (Oral)   Resp 16   Ht 5' 5\" (1.651 m)   Wt 222 lb (100.7 kg)   LMP 2000   SpO2 98%   BMI 36.94 kg/m²          PHYSICAL EXAMINATION   Physical Exam  Vitals reviewed. Constitutional:       General: She is not in acute distress. Neck:      Vascular: No carotid bruit. Cardiovascular:      Rate and Rhythm: Normal rate and regular rhythm. Heart sounds: Normal heart sounds. Pulmonary:      Effort: Pulmonary effort is normal.      Breath sounds: Normal breath sounds. Neurological:      Mental Status: She is alert.                 LABORATORY DATA/ANCILLARY/IMAGING     Results for orders placed or performed in visit on 21   MICROALBUMIN, UR, RAND W/ MICROALB/CREAT RATIO   Result Value Ref Range    Microalbumin,urine random 3.10 MG/DL    Creatinine, urine 120.00 mg/dL    Microalbumin/Creat ratio (mg/g creat) 26 0 - 30 mg/g   HEMOGLOBIN A1C WITH EAG   Result Value Ref Range    Hemoglobin A1c 6.3 (H) 4.0 - 5.6 %    Est. average glucose 134 mg/dL   TSH 3RD GENERATION Result Value Ref Range    TSH 0.90 0.36 - 3.74 uIU/mL   LIPID PANEL   Result Value Ref Range    Cholesterol, total 179 <200 MG/DL    Triglyceride 93 <150 MG/DL    HDL Cholesterol 57 MG/DL    LDL, calculated 103.4 (H) 0 - 100 MG/DL    VLDL, calculated 18.6 MG/DL    CHOL/HDL Ratio 3.1 0.0 - 5.0     METABOLIC PANEL, COMPREHENSIVE   Result Value Ref Range    Sodium 141 136 - 145 mmol/L    Potassium 4.7 3.5 - 5.1 mmol/L    Chloride 107 97 - 108 mmol/L    CO2 29 21 - 32 mmol/L    Anion gap 5 5 - 15 mmol/L    Glucose 113 (H) 65 - 100 mg/dL    BUN 12 6 - 20 MG/DL    Creatinine 1.08 (H) 0.55 - 1.02 MG/DL    BUN/Creatinine ratio 11 (L) 12 - 20      GFR est AA 59 (L) >60 ml/min/1.73m2    GFR est non-AA 49 (L) >60 ml/min/1.73m2    Calcium 10.0 8.5 - 10.1 MG/DL    Bilirubin, total 0.5 0.2 - 1.0 MG/DL    ALT (SGPT) 18 12 - 78 U/L    AST (SGOT) 15 15 - 37 U/L    Alk. phosphatase 113 45 - 117 U/L    Protein, total 7.2 6.4 - 8.2 g/dL    Albumin 3.7 3.5 - 5.0 g/dL    Globulin 3.5 2.0 - 4.0 g/dL    A-G Ratio 1.1 1.1 - 2.2     CBC W/O DIFF   Result Value Ref Range    WBC 9.2 3.6 - 11.0 K/uL    RBC 4.27 3.80 - 5.20 M/uL    HGB 12.8 11.5 - 16.0 g/dL    HCT 41.8 35.0 - 47.0 %    MCV 97.9 80.0 - 99.0 FL    MCH 30.0 26.0 - 34.0 PG    MCHC 30.6 30.0 - 36.5 g/dL    RDW 14.2 11.5 - 14.5 %    PLATELET 446 365 - 267 K/uL    MPV 10.1 8.9 - 12.9 FL    NRBC 0.0 0  WBC    ABSOLUTE NRBC 0.00 0.00 - 0.01 K/uL   SAMPLES BEING HELD   Result Value Ref Range    SAMPLES BEING HELD 1 CUP OF URINE     COMMENT        Add-on orders for these samples will be processed based on acceptable specimen integrity and analyte stability, which may vary by analyte. ASSESSMENT & PLAN   Diagnoses and all orders for this visit:    1. Type 2 diabetes mellitus with stage 3a chronic kidney disease, without long-term current use of insulin ([facility] [address] improved, controlled and at diabetic goal w/o medication  -     AMB POC HEMOGLOBIN A1C: 6.0    2.  Benign essential hypertension, controlled, stable  Continue current regimen    3. Hypercholesterolemia, mild, not on statin therapy, personal preference for now    4. 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 Elbert Mane (left), changed to Dr. Merle Catherine. Followed annually. Next upcoming appointment 8/2022. Stable. Asymptomatic. No changes at this time. Cardiovascular risk and specific lipid/LDL/Hgba1c/BP goals reviewed  Reviewed diet, nutrition, exercise, weight management, BMI/goals.   Reviewed medications, effects and possible side effects   Doing well on current regimen  No changes at this time  RTC 6 months for Medicare AWV and fasting follow up w/ complete labs at that time    Encounter time: 20\"

## 2022-07-26 ENCOUNTER — TRANSCRIBE ORDER (OUTPATIENT)
Dept: SCHEDULING | Age: 77
End: 2022-07-26

## 2022-07-26 DIAGNOSIS — Z12.31 SCREENING MAMMOGRAM FOR HIGH-RISK PATIENT: Primary | ICD-10-CM

## 2022-09-13 DIAGNOSIS — R60.9 DEPENDENT EDEMA: ICD-10-CM

## 2022-09-13 DIAGNOSIS — I87.2 VENOUS INSUFFICIENCY OF BOTH LOWER EXTREMITIES: ICD-10-CM

## 2022-09-13 DIAGNOSIS — I10 BENIGN ESSENTIAL HYPERTENSION: ICD-10-CM

## 2022-09-13 RX ORDER — SPIRONOLACTONE 50 MG/1
50 TABLET, FILM COATED ORAL 2 TIMES DAILY
Qty: 180 TABLET | Refills: 0 | Status: SHIPPED | OUTPATIENT
Start: 2022-09-13

## 2022-09-13 NOTE — TELEPHONE ENCOUNTER
Maria Guadalupe with request for refill. Thanks, Naa    Last Visit: 6/23/22 MD SALVADOR  Next Appointment: 11/14/22 MD SALVADOR  Previous Refill Encounter(s): 4/18/22 180 + 1    Requested Prescriptions     Pending Prescriptions Disp Refills    spironolactone (ALDACTONE) 50 mg tablet 180 Tablet 1     Sig: Take 1 Tablet by mouth two (2) times a day. For [facility] in place:   Recommendation Provided To:    Intervention Detail: New Rx: 1, reason: Patient Preference  Gap Closed?:   Intervention Accepted By:   Time Spent (min): 5

## 2022-10-06 ENCOUNTER — TELEPHONE (OUTPATIENT)
Dept: FAMILY MEDICINE CLINIC | Age: 77
End: 2022-10-06

## 2022-10-06 NOTE — TELEPHONE ENCOUNTER
MD Thomas,    Patient call asking if she has had the flu shot? Asking for a callback. [phone].     Thanks, Reed Headley

## 2022-10-06 NOTE — TELEPHONE ENCOUNTER
This message came to me from [address] as a Medication Refill request but it doesn't appear to be. I am confused by patient's message. Is patient asking US if she already had her flu shot? Or is she asking if/when she can get one? She would know better than us if she already had one this season.

## 2022-10-07 ENCOUNTER — TELEPHONE (OUTPATIENT)
Dept: FAMILY MEDICINE CLINIC | Age: 77
End: 2022-10-07

## 2022-10-07 NOTE — TELEPHONE ENCOUNTER
JUSTINO for return call    Best call back #[phone]    Spoke to pt ans she wanted to know when she had the last flu shot. Advised that my record shows 11/11/21.

## 2022-10-07 NOTE — TELEPHONE ENCOUNTER
Patient called wanting to know the last shot she got.     Requesting a call back    Best call back #[phone]

## 2022-10-18 ENCOUNTER — OFFICE VISIT (OUTPATIENT)
Dept: CARDIOLOGY CLINIC | Age: 77
End: 2022-10-18
Payer: MEDICARE

## 2022-10-18 VITALS
HEIGHT: 65 IN | BODY MASS INDEX: 37.15 KG/M2 | WEIGHT: 223 LBS | OXYGEN SATURATION: 96 % | SYSTOLIC BLOOD PRESSURE: 138 MMHG | HEART RATE: 72 BPM | DIASTOLIC BLOOD PRESSURE: 86 MMHG

## 2022-10-18 DIAGNOSIS — E78.00 HYPERCHOLESTEROLEMIA: Primary | ICD-10-CM

## 2022-10-18 DIAGNOSIS — R06.02 SOB (SHORTNESS OF BREATH): ICD-10-CM

## 2022-10-18 DIAGNOSIS — I48.91 ATRIAL FIBRILLATION, UNSPECIFIED TYPE (HCC): ICD-10-CM

## 2022-10-18 DIAGNOSIS — I10 BENIGN ESSENTIAL HYPERTENSION: ICD-10-CM

## 2022-10-18 PROCEDURE — 93000 ELECTROCARDIOGRAM COMPLETE: CPT | Performed by: INTERNAL MEDICINE

## 2022-10-18 PROCEDURE — 99214 OFFICE O/P EST MOD 30 MIN: CPT | Performed by: INTERNAL MEDICINE

## 2022-10-18 PROCEDURE — 1123F ACP DISCUSS/DSCN MKR DOCD: CPT | Performed by: INTERNAL MEDICINE

## 2022-10-18 NOTE — PROGRESS NOTES
Tapan Romero MD, MS, [facility]            HISTORY OF PRESENT ILLNESS:    Samuel Aguirre is a 76 y.o. female here for FU. Seen at my prior practice - admitted [facility] 12/2019 - NSTEMI - echo normal LV fxn, mild LVH. Cath no obstructive CAD. Placed on medical therapy. In the interim was admitted to [facility] with AFIB with RVR, Dr. Aristeo Abbott performed afib ablation - perhaps 2021. All went well. Things have been calm, occasional pings/shocks on left side of chest, with SOB. Sits down. Wore monitor about two weeks ago, do not have results. Uvaldo Holliday feels well. Will reuqets VAC records. Echo + FU 6 months. Total time spent >35 min, reviewing my prior notes, referring physician notes, other subspecialty and primary care notes, all available lab values, all available cardiac testing, all available radiology imaging, vital signs, weights, medications, potential medication interactions, family history, preparing my notes, updating diagnoses, correcting chart errors from other providers, documenting the above, discussing findings/results/testing methodologies/plan with patient, ordering tests/lab, sending prescriptions. SUMMARY:   Problem List  Date Reviewed: 6/23/2022            Codes Class Noted    Type 2 diabetes mellitus with stage 3a chronic kidney disease, without long-term current use of insulin ([facility] ICD-10-CM: E11.22, N18.31  ICD-9-CM: 250.40, 585.3  11/11/2021    Overview Addendum 11/11/2021 10:35 AM by Yesica Martins MD     Diagnosed Type 2 NIDDM 5/2012, HgbA1c 6.5; CKD 2012; Dr Yoni Diamond al; Renal US 2/2013; follow up prn             History of supraventricular tachycardia ICD-10-CM: Z86.79  ICD-9-CM: V12.59  3/24/2021    Overview Addendum 11/11/2021 10:19 AM by Yesica Martins MD     [facility] 3/3/21-3/6/21 S/p cardioversion w/ IV Adenosine. Stable on increased regimen of Toprol.  EP cardiology follow up 3/30/21 at [facility] w/ Dr. Lam Johnson, planning on ablation for recurrent SVT             History of non-ST elevation myocardial infarction (NSTEMI) ICD-10-CM: I25.2  ICD-9-CM: 412  3/24/2021    Overview Addendum 2021 10:18 AM by Ligia Kearns MD     Mild nonobstructive CAD proximal LAD on left heart cath  CJW, Echo mild LVH, EF 60-65%Cardio Dr. Karina Hanson (left), changed to Dr. Wanda Fam             Venous insufficiency of both lower extremities ICD-10-CM: I87.2  ICD-9-CM: 459.81  2020        Venous stasis dermatitis of both lower extremities ICD-10-CM: I87.2  ICD-9-CM: 454.1  2020        History of angina ICD-10-CM: Z86.79  ICD-9-CM: V12.59  2020    Overview Addendum 2022 10:38 AM by Ligia Kearns MD     2012, Dr Ethel Chávez; Cardiac cath negative, GI related; [facility] , Cardiac Cath, reportedly mild CAD, Dr. Karina Hanson (left), changed to Dr. Wanda Fam             ACP (advance care planning) ICD-10-CM: Z71.89  ICD-9-CM: V65.49  2017    Overview Addendum 2018 10:52 AM by Ligia Kearns MD     Initial ACP discussion . AMD form reviewed and copy provided. NN/facilitator to discuss with patient.  Discussion and info again                Benign essential hypertension ICD-10-CM: I10  ICD-9-CM: 401.1  2016    Overview Signed 2016 11:03 AM by Ligia Kearns MD     DX              Vitamin D deficiency ICD-10-CM: E55.9  ICD-9-CM: 268.9  2013    Overview Signed 2013  9:25 AM by Ligia Kearns MD     2013; weekly rx vit D per Nephrology             Dyspepsia & Heartburn ICD-10-CM: R10.13  ICD-9-CM: 536.8  2012    Overview Signed 2012  9:00 AM by Ligia Kearns MD     , cardiac w/u negative; improved w/ Pepcid             Hypercholesterolemia ICD-10-CM: E78.00  ICD-9-CM: 272.0  2011        Bilateral Dependent Foot & Ankle Edema, L>R ICD-10-CM: R60.9  ICD-9-CM: 782.3  2010         (normal spontaneous vaginal delivery) x3 ICD-10-CM: O80  ICD-9-CM: 296  Unknown    Overview Addendum 2022 10:33 AM by Amarilys Torre MD     SAB x 2;  x4, but 1 fetal demise after delivery at 7mos             S/P D&C (status post dilation and curettage) ICD-10-CM: W02.242  ICD-9-CM: V45.89  Unknown    Overview Signed 2010 12:08 PM by Amarilys Torre MD     X1, JEFRY  x1, AUB             S/p laparoscopic cholecystectomy gallstones ICD-10-CM: Z90.49  ICD-9-CM: V45.89  Unknown    Overview Signed 5/3/2010 10:48 AM by Amarilys Torre MD                  S/p Rt bunionectomy ICD-10-CM: Q24.569  ICD-9-CM: V45.89  Unknown        Lt Ankle edema, s/p surgery ICD-10-CM: M25.473  ICD-9-CM: 719.07  Unknown    Overview Signed 5/3/2010 10:49 AM by Amarilys Torre MD     Early 8485'R                Current Outpatient Medications on File Prior to Visit   Medication Sig    spironolactone (ALDACTONE) 50 mg tablet Take 1 Tablet by mouth two (2) times a day. metoprolol succinate (TOPROL-XL) 25 mg XL tablet TAKE ONE TABLET BY MOUTH  DAILY FOR HYPERTENSION    isosorbide mononitrate ER (IMDUR) 30 mg tablet Take 30 mg by mouth daily. Per cardio    triamcinolone acetonide (KENALOG) 0.5 % ointment Apply  to affected area two (2) times a day. Use thin layer to dry, irritated areas on lower legs    TURMERIC PO Take  by mouth daily. diclofenac (VOLTAREN) 1 % gel Apply 2 g to affected area four (4) times daily as needed. cyanocobalamin 1,000 mcg tablet Take 1,000 mcg by mouth daily. (Patient not taking: Reported on 2022)    acetaminophen (TYLENOL) 500 mg tablet Take 500 mg by mouth daily as needed for Pain. cholecalciferol (VITAMIN D3) 1,000 unit tablet Take  by mouth daily. aspirin 81 mg tablet Take 81 mg by mouth daily. No current facility-administered medications on file prior to visit. CARDIOLOGY STUDIES TO DATE:  No results found for this visit on 10/18/22.                 CARDIAC ROS:   negative    Past Medical History:   Diagnosis Date    ACP (advance care planning) 2017    Initial ACP discussion . AMD form reviewed and copy provided. NN/facilitator to discuss with patient     Arthritis     Benign essential hypertension 2016    DX     Bilateral Dependent Foot & Ankle Edema, L>R 2010    Dyspepsia 2012    History of angina 2020, Dr Cong Nation; Cardiac cath negative, GI related; [facility] , Cardiac Cath, reportedly mild CAD, Dr. Carlos Saavedra (left), changed to Dr. Dayanna Mackenzie    History of non-ST elevation myocardial infarction (NSTEMI) 3/24/2021    Mild nonobstructive CAD proximal LAD on left heart cath  CJW, Echo mild LVH, EF 60-65%Cardio Dr. Carlos Saavedra    History of supraventricular tachycardia 3/24/2021    [facility] 3/3/21-3/6/21 S/p cardioversion w/ IV Adenosine. Stable on increased regimen of Toprol.  EP cardiology follow up 3/30/21 at [facility] w/ Dr. Les Ashley, planning on ablation for recurrent SVT    Hx of complete eye exam 2017    cataracts OU-return in one year-[address]    Hypercholesterolemia 2011    Lt Ankle edema, s/p surgery     Mild renal insufficiency 2011    Type 2 diabetes mellitus with stage 3a chronic kidney disease, without long-term current use of insulin ([address] 2021    Diagnosed Type 2 NIDDM 2012, HgbA1c 6.5; CKD ; Dr Jonathan Diaz al; Renal US 2013; follow up prn    Venous insufficiency of both lower extremities 2020    Venous stasis dermatitis of both lower extremities 2020    Vitamin D deficiency 2013; weekly rx vit D per Nephrology       Family History   Problem Relation Age of Onset    Hypertension Mother     OSTEOARTHRITIS Mother         B TKR    Arthritis-rheumatoid Mother         juvenile    Breast Cancer Mother 80    Other Mother         Diverticulitis    Hypertension Father     Diabetes Father          age 80 kidney failure; dialysis pt    Diabetes Sister     Cancer Brother  age 46 \"bone cancer\"    Alcohol abuse Brother          of cirrhosis age 58    Heart Disease Maternal Grandmother          at 80    Heart Disease Paternal Grandmother          at 80    Heart Attack Paternal Grandfather          in his 66's    Anesth Problems Neg Hx        Social History     Socioeconomic History    Marital status:      Spouse name: Not on file    Number of children: 3    Years of education: Not on file    Highest education level: Not on file   Occupational History    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 [employer]   Tobacco Use    Smoking status: Never    Smokeless tobacco: Never   Vaping Use    Vaping Use: Never used   Substance and Sexual Activity    Alcohol use: Yes     Comment: rarely    Drug use: No    Sexual activity: Yes     Partners: Male   Other Topics Concern     Service No    Blood Transfusions No    Caffeine Concern No     Comment: 1 bottle of 16-20 oz of Pepsi a day    Occupational Exposure No    Hobby Hazards No    Sleep Concern No    Stress Concern No    Weight Concern Yes     Comment: overweight    Special Diet No    Back Care No    Exercise No    Bike Helmet Yes    Seat Belt Yes    Self-Exams Yes   Social History Narrative    , 3 sons, 4 grandchildren    Lives at home w/ her  in their trilevel home. Moved her elderly sister in w/ them.     Taking care of 7yo grandson whose mother  2019    Retired from [address] at [employer] ( [employer]        Diet: Nothing special    Exercise: No    Caffeine: 1 bottle of 16-20 oz of Pepsi a day    Weight: Staying in the 220's         Social Determinants of Health     Financial Resource Strain: Not on file   Food Insecurity: Not on file   Transportation Needs: Not on file   Physical Activity: Not on file   Stress: Not on file   Social Connections: Not on file   Intimate Partner Violence: Not on file   Housing Stability: Not on file        GENERAL ROS:  A comprehensive review of systems was negative except for that written in the HPI. Visit Vitals  LMP 07/25/2000     There were no vitals filed for this visit.      Wt Readings from Last 3 Encounters:   06/23/22 222 lb (100.7 kg)   11/11/21 220 lb 3.2 oz (99.9 kg)   03/24/21 223 lb 9.6 oz (101.4 kg)            BP Readings from Last 3 Encounters:   06/23/22 132/80   11/11/21 138/84   03/24/21 120/70       PHYSICAL EXAM  General appearance: alert, cooperative, no distress, appears stated age  Neck: supple, symmetrical, trachea midline, no adenopathy, thyroid: not enlarged, symmetric, no tenderness/mass/nodules, no carotid bruit, and no JVD  Lungs: clear to auscultation bilaterally  Heart: regular rate and rhythm, S1, S2 normal, no murmur, click, rub or gallop  Extremities: extremities normal, atraumatic, no cyanosis or edema    Lab Results   Component Value Date/Time    Cholesterol, total 179 11/11/2021 10:52 AM    Cholesterol, total 190 09/14/2020 10:46 AM    Cholesterol, total 179 11/05/2019 09:12 AM    Cholesterol, total 203 (H) 05/13/2019 03:20 PM    Cholesterol, total 185 11/05/2018 01:08 PM    HDL Cholesterol 57 11/11/2021 10:52 AM    HDL Cholesterol 55 09/14/2020 10:46 AM    HDL Cholesterol 57 11/05/2019 09:12 AM    HDL Cholesterol 57 05/13/2019 03:20 PM    HDL Cholesterol 53 11/05/2018 01:08 PM    LDL, calculated 103.4 (H) 11/11/2021 10:52 AM    LDL, calculated 114 (H) 09/14/2020 10:46 AM    LDL, calculated 104 (H) 11/05/2019 09:12 AM    LDL, calculated 126 (H) 05/13/2019 03:20 PM    LDL, calculated 105 (H) 11/05/2018 01:08 PM    LDL, calculated 104 (H) 05/04/2018 11:04 AM    Triglyceride 93 11/11/2021 10:52 AM    Triglyceride 120 09/14/2020 10:46 AM    Triglyceride 92 11/05/2019 09:12 AM    Triglyceride 99 05/13/2019 03:20 PM    Triglyceride 137 11/05/2018 01:08 PM    CHOL/HDL Ratio 3.1 11/11/2021 10:52 AM    CHOL/HDL Ratio 3.6 05/03/2010 11:36 AM       Lab Results   Component Value Date/Time    WBC 9.2 11/11/2021 10:52 AM    WBC 8.5 05/16/2012 09:08 AM    HGB 12.8 11/11/2021 10:52 AM    HCT 41.8 11/11/2021 10:52 AM    PLATELET 144 47/74/2799 10:52 AM    MCV 97.9 11/11/2021 10:52 AM        Lab Results   Component Value Date/Time    Cholesterol, total 179 11/11/2021 10:52 AM    Cholesterol, total 190 09/14/2020 10:46 AM    Cholesterol, total 179 11/05/2019 09:12 AM    Cholesterol, total 203 (H) 05/13/2019 03:20 PM    Cholesterol, total 185 11/05/2018 01:08 PM    HDL Cholesterol 57 11/11/2021 10:52 AM    HDL Cholesterol 55 09/14/2020 10:46 AM    HDL Cholesterol 57 11/05/2019 09:12 AM    HDL Cholesterol 57 05/13/2019 03:20 PM    HDL Cholesterol 53 11/05/2018 01:08 PM    LDL, calculated 103.4 (H) 11/11/2021 10:52 AM    LDL, calculated 114 (H) 09/14/2020 10:46 AM    LDL, calculated 104 (H) 11/05/2019 09:12 AM    LDL, calculated 126 (H) 05/13/2019 03:20 PM    LDL, calculated 105 (H) 11/05/2018 01:08 PM    LDL, calculated 104 (H) 05/04/2018 11:04 AM    Triglyceride 93 11/11/2021 10:52 AM    Triglyceride 120 09/14/2020 10:46 AM    Triglyceride 92 11/05/2019 09:12 AM    Triglyceride 99 05/13/2019 03:20 PM    Triglyceride 137 11/05/2018 01:08 PM    CHOL/HDL Ratio 3.1 11/11/2021 10:52 AM    CHOL/HDL Ratio 3.6 05/03/2010 11:36 AM        ASSESSMENT    ICD-10-CM ICD-9-CM    1. Hypercholesterolemia  E78.00 272.0       2. Benign essential hypertension  I10 401.1       3. SOB (shortness of breath)  R06.02 786.05 AMB POC EKG ROUTINE W/ 12 LEADS, INTER & REP      ECHO ADULT COMPLETE      4. Atrial fibrillation, unspecified type ([facility]  I48.91 427.31 ECHO ADULT COMPLETE          Encounter Diagnoses   Name Primary? Hypercholesterolemia Yes    Benign essential hypertension      No orders of the defined types were placed in this encounter.           Malissa Valadez MD  10/18/2022        [address]  [address]  [place], [address] [place] [address]  [phone])  [phone])    [address] [facility] [address]  [address] [facility] [address]  [address]  [phone]  [phone] (F)    ATTENTION:   This medical record was transcribed using an electronic medical records/speech recognition system. Although proofread, it may and can contain electronic, spelling and other errors. Corrections may be executed at a later time. Please feel free to contact us for any clarifications as needed.

## 2022-10-18 NOTE — LETTER
10/18/2022    Patient: Samy Romo   YOB: 1945   Date of Visit: 10/18/2022     Nino Lu MD  [address] [facility]    Dear Nino Lu MD,      Thank you for referring Ms. Abiola Corbin to [facility] for evaluation. My notes for this consultation are attached. If you have questions, please do not hesitate to call me. I look forward to following your patient along with you.       Sincerely,    Ryan Vale MD

## 2022-10-18 NOTE — PROGRESS NOTES
Ez Potts is a 76 y.o. female    Chief Complaint   Patient presents with    New Patient     Est care     Patient was being follow by Dr Rosalinda Delgadillo - [facility]     Chest pain no    SOB patient states SOB with movement     Dizziness no    Swelling  ankles     Refills no    Visit Vitals  /86 (BP 1 Location: Left upper arm, BP Patient Position: Sitting)   Pulse 72   Ht 5' 5\" (1.651 m)   Wt 223 lb (101.2 kg)   LMP 07/25/2000   SpO2 96%   BMI 37.11 kg/m²       1. Have you been to the ER, urgent care clinic since your last visit? Hospitalized since your last visit? No    2. Have you seen or consulted any other health care providers outside of the [address] [place] [address] since your last visit? Include any pap smears or colon screening.  No

## 2022-11-14 ENCOUNTER — OFFICE VISIT (OUTPATIENT)
Dept: FAMILY MEDICINE CLINIC | Age: 77
End: 2022-11-14
Payer: MEDICARE

## 2022-11-14 VITALS
RESPIRATION RATE: 16 BRPM | BODY MASS INDEX: 37.39 KG/M2 | SYSTOLIC BLOOD PRESSURE: 136 MMHG | HEART RATE: 70 BPM | DIASTOLIC BLOOD PRESSURE: 74 MMHG | TEMPERATURE: 97.6 F | OXYGEN SATURATION: 97 % | HEIGHT: 65 IN | WEIGHT: 224.4 LBS

## 2022-11-14 DIAGNOSIS — Z00.00 MEDICARE ANNUAL WELLNESS VISIT, SUBSEQUENT: Primary | ICD-10-CM

## 2022-11-14 DIAGNOSIS — E11.22 TYPE 2 DIABETES MELLITUS WITH STAGE 3A CHRONIC KIDNEY DISEASE, WITHOUT LONG-TERM CURRENT USE OF INSULIN (HCC): ICD-10-CM

## 2022-11-14 DIAGNOSIS — E78.00 HYPERCHOLESTEROLEMIA: ICD-10-CM

## 2022-11-14 DIAGNOSIS — I10 BENIGN ESSENTIAL HYPERTENSION: ICD-10-CM

## 2022-11-14 DIAGNOSIS — N18.31 TYPE 2 DIABETES MELLITUS WITH STAGE 3A CHRONIC KIDNEY DISEASE, WITHOUT LONG-TERM CURRENT USE OF INSULIN (HCC): ICD-10-CM

## 2022-11-14 LAB
ALBUMIN SERPL-MCNC: 3.8 G/DL (ref 3.5–5)
ALBUMIN/GLOB SERPL: 1 {RATIO} (ref 1.1–2.2)
ALP SERPL-CCNC: 120 U/L (ref 45–117)
ALT SERPL-CCNC: 18 U/L (ref 12–78)
ANION GAP SERPL CALC-SCNC: 4 MMOL/L (ref 5–15)
AST SERPL-CCNC: 9 U/L (ref 15–37)
BILIRUB SERPL-MCNC: 0.3 MG/DL (ref 0.2–1)
BUN SERPL-MCNC: 19 MG/DL (ref 6–20)
BUN/CREAT SERPL: 17 (ref 12–20)
CALCIUM SERPL-MCNC: 9.1 MG/DL (ref 8.5–10.1)
CHLORIDE SERPL-SCNC: 106 MMOL/L (ref 97–108)
CHOLEST SERPL-MCNC: 180 MG/DL
CO2 SERPL-SCNC: 31 MMOL/L (ref 21–32)
CREAT SERPL-MCNC: 1.09 MG/DL (ref 0.55–1.02)
CREAT UR-MCNC: 119 MG/DL
ERYTHROCYTE [DISTWIDTH] IN BLOOD BY AUTOMATED COUNT: 13.2 % (ref 11.5–14.5)
EST. AVERAGE GLUCOSE BLD GHB EST-MCNC: 131 MG/DL
GLOBULIN SER CALC-MCNC: 3.7 G/DL (ref 2–4)
GLUCOSE SERPL-MCNC: 111 MG/DL (ref 65–100)
HBA1C MFR BLD: 6.2 % (ref 4–5.6)
HCT VFR BLD AUTO: 41.8 % (ref 35–47)
HDLC SERPL-MCNC: 62 MG/DL
HDLC SERPL: 2.9 {RATIO} (ref 0–5)
HGB BLD-MCNC: 13.3 G/DL (ref 11.5–16)
LDLC SERPL CALC-MCNC: 87.6 MG/DL (ref 0–100)
MCH RBC QN AUTO: 31.3 PG (ref 26–34)
MCHC RBC AUTO-ENTMCNC: 31.8 G/DL (ref 30–36.5)
MCV RBC AUTO: 98.4 FL (ref 80–99)
MICROALBUMIN UR-MCNC: 2.44 MG/DL
MICROALBUMIN/CREAT UR-RTO: 21 MG/G (ref 0–30)
NRBC # BLD: 0 K/UL (ref 0–0.01)
NRBC BLD-RTO: 0 PER 100 WBC
PLATELET # BLD AUTO: 271 K/UL (ref 150–400)
PMV BLD AUTO: 9.8 FL (ref 8.9–12.9)
POTASSIUM SERPL-SCNC: 4.1 MMOL/L (ref 3.5–5.1)
PROT SERPL-MCNC: 7.5 G/DL (ref 6.4–8.2)
RBC # BLD AUTO: 4.25 M/UL (ref 3.8–5.2)
SODIUM SERPL-SCNC: 141 MMOL/L (ref 136–145)
TRIGL SERPL-MCNC: 152 MG/DL (ref ?–150)
TSH SERPL DL<=0.05 MIU/L-ACNC: 2.48 UIU/ML (ref 0.36–3.74)
VLDLC SERPL CALC-MCNC: 30.4 MG/DL
WBC # BLD AUTO: 13.1 K/UL (ref 3.6–11)

## 2022-11-14 PROCEDURE — G8427 DOCREV CUR MEDS BY ELIG CLIN: HCPCS | Performed by: FAMILY MEDICINE

## 2022-11-14 PROCEDURE — G8754 DIAS BP LESS 90: HCPCS | Performed by: FAMILY MEDICINE

## 2022-11-14 PROCEDURE — G8417 CALC BMI ABV UP PARAM F/U: HCPCS | Performed by: FAMILY MEDICINE

## 2022-11-14 PROCEDURE — G8510 SCR DEP NEG, NO PLAN REQD: HCPCS | Performed by: FAMILY MEDICINE

## 2022-11-14 PROCEDURE — G0439 PPPS, SUBSEQ VISIT: HCPCS | Performed by: FAMILY MEDICINE

## 2022-11-14 PROCEDURE — 1123F ACP DISCUSS/DSCN MKR DOCD: CPT | Performed by: FAMILY MEDICINE

## 2022-11-14 PROCEDURE — 1101F PT FALLS ASSESS-DOCD LE1/YR: CPT | Performed by: FAMILY MEDICINE

## 2022-11-14 PROCEDURE — G8536 NO DOC ELDER MAL SCRN: HCPCS | Performed by: FAMILY MEDICINE

## 2022-11-14 PROCEDURE — 3078F DIAST BP <80 MM HG: CPT | Performed by: FAMILY MEDICINE

## 2022-11-14 PROCEDURE — 3044F HG A1C LEVEL LT 7.0%: CPT | Performed by: FAMILY MEDICINE

## 2022-11-14 PROCEDURE — 3074F SYST BP LT 130 MM HG: CPT | Performed by: FAMILY MEDICINE

## 2022-11-14 PROCEDURE — 99213 OFFICE O/P EST LOW 20 MIN: CPT | Performed by: FAMILY MEDICINE

## 2022-11-14 PROCEDURE — G8752 SYS BP LESS 140: HCPCS | Performed by: FAMILY MEDICINE

## 2022-11-14 PROCEDURE — 1090F PRES/ABSN URINE INCON ASSESS: CPT | Performed by: FAMILY MEDICINE

## 2022-11-14 PROCEDURE — G8399 PT W/DXA RESULTS DOCUMENT: HCPCS | Performed by: FAMILY MEDICINE

## 2022-11-14 NOTE — PATIENT INSTRUCTIONS
Medicare Wellness Visit, Female     The best way to live healthy is to have a lifestyle where you eat a well-balanced diet, exercise regularly, limit alcohol use, and quit all forms of tobacco/nicotine, if applicable. Regular preventive services are another way to keep healthy. Preventive services (vaccines, screening tests, monitoring & exams) can help personalize your care plan, which helps you manage your own care. Screening tests can find health problems at the earliest stages, when they are easiest to treat. [facility] follows the current, evidence-based guidelines published by the [facility] ([facility]) when recommending preventive services for our patients. Because we follow these guidelines, sometimes recommendations change over time as research supports it. (For example, mammograms used to be recommended annually. Even though Medicare will still pay for an annual mammogram, the newer guidelines recommend a mammogram every two years for women of average risk). Of course, you and your doctor may decide to screen more often for some diseases, based on your risk and your co-morbidities (chronic disease you are already diagnosed with). Preventive services for you include:  - Medicare offers their members a free annual wellness visit, which is time for you and your primary care provider to discuss and plan for your preventive service needs. Take advantage of this benefit every year!  -All adults over the age of 72 should receive the recommended pneumonia vaccines. Current USPSTF guidelines recommend a series of two vaccines for the best pneumonia protection.   -All adults should have a flu vaccine yearly and a tetanus vaccine every 10 years.   -All adults age 48 and older should receive the shingles vaccines (series of two vaccines).       -All adults age 38-68 who are overweight should have a diabetes screening test once every three years.   -All adults born between [birth year] and 1965 should be screened once for Hepatitis C.  -Other screening tests and preventive services for persons with diabetes include: an eye exam to screen for diabetic retinopathy, a kidney function test, a foot exam, and stricter control over your cholesterol.   -Cardiovascular screening for adults with routine risk involves an electrocardiogram (ECG) at intervals determined by your doctor.   -Colorectal cancer screenings should be done for adults age 54-65 with no increased risk factors for colorectal cancer. There are a number of acceptable methods of screening for this type of cancer. Each test has its own benefits and drawbacks. Discuss with your doctor what is most appropriate for you during your annual wellness visit. The different tests include: colonoscopy (considered the best screening method), a fecal occult blood test, a fecal DNA test, and sigmoidoscopy.    -A bone mass density test is recommended when a woman turns 65 to screen for osteoporosis. This test is only recommended one time, as a screening. Some providers will use this same test as a disease monitoring tool if you already have osteoporosis. -Breast cancer screenings are recommended every other year for women of normal risk, age 54-69.  -Cervical cancer screenings for women over age 72 are only recommended with certain risk factors.      Here is a list of your current Health Maintenance items (your personalized list of preventive services) with a due date:  Health Maintenance Due   Topic Date Due    Eye Exam  01/23/2021    Diabetic Foot Care  11/11/2022    Albumin Urine Test  11/11/2022    Cholesterol Test   11/11/2022

## 2022-11-14 NOTE — PROGRESS NOTES
This is the Subsequent Medicare Annual Wellness Exam, performed 12 months or more after the Initial AWV or the last Subsequent AWV    I have reviewed the patient's medical history in detail and updated the computerized patient record. Assessment/Plan   Education and counseling provided:  Are appropriate based on today's review and evaluation    1. Medicare annual wellness visit, subsequent     Depression Risk Factor Screening     3 most recent PHQ Screens 11/14/2022   PHQ Not Done -   Little interest or pleasure in doing things Not at all   Feeling down, depressed, irritable, or hopeless Not at all   Total Score PHQ 2 0       Alcohol & Drug Abuse Risk Screen    Do you average more than 1 drink per night or more than 7 drinks a week:  No    On any one occasion in the past three months have you have had more than 3 drinks containing alcohol:  No          Functional Ability and Level of Safety    Hearing: Hearing is good. Activities of Daily Living:  Patient does total self care  ADL Assessment 11/14/2022   Feeding yourself No Help Needed   Getting from bed to chair No Help Needed   Getting dressed No Help Needed   Bathing or showering No Help Needed   Walk across the room (includes cane/walker) No Help Needed   Using the telphone No Help Needed   Taking your medications No Help Needed   Preparing meals No Help Needed   Managing money (expenses/bills) No Help Needed   Moderately strenuous housework (laundry) No Help Needed   Shopping for personal items (toiletries/medicines) No Help Needed   Shopping for groceries No Help Needed   Driving No Help Needed   Climbing a flight of stairs No Help Needed   Getting to places beyond walking distances No Help Needed         Ambulation: with no difficulty     Fall Risk:  Fall Risk Assessment, last 12 mths 11/14/2022   Able to walk? Yes   Fall in past 12 months? 0   Do you feel unsteady?  0   Are you worried about falling 0      Abuse Screen:  Patient is not abused Cognitive Screening    Has your family/caregiver stated any concerns about your memory: no         Health Maintenance Due     Health Maintenance Due   Topic Date Due    Eye Exam Retinal or Dilated  2021    Foot Exam Q1  2022    MICROALBUMIN Q1  2022    Lipid Screen  2022       Patient Care Team   Patient Care Team:  Laurel Jacobson MD as PCP - General  Laurel Jacobson MD as PCP - [facility] Empaneled Provider    History     Patient Active Problem List   Diagnosis Code     (normal spontaneous vaginal delivery) x3 O80    S/P D&C (status post dilation and curettage) Z98.890    S/p laparoscopic cholecystectomy gallstones Z90.49    S/p Rt bunionectomy Z98.890    Lt Ankle edema, s/p surgery M25.473    Bilateral Dependent Foot & Ankle Edema, L>R R60.9    Hypercholesterolemia E78.00    Dyspepsia & Heartburn R10.13    Vitamin D deficiency E55.9    Benign essential hypertension I10    ACP (advance care planning) Z71.89    History of angina Z86.79    Venous insufficiency of both lower extremities I87.2    Venous stasis dermatitis of both lower extremities I87.2    History of supraventricular tachycardia Z86.79    History of non-ST elevation myocardial infarction (NSTEMI) I25.2    Type 2 diabetes mellitus with stage 3a chronic kidney disease, without long-term current use of insulin ([facility] E11.22, N18.31     Past Medical History:   Diagnosis Date    ACP (advance care planning) 2017    Initial ACP discussion -. AMD form reviewed and copy provided. NN/facilitator to discuss with patient     Arthritis     Benign essential hypertension 2016    DX     Bilateral Dependent Foot & Ankle Edema, L>R 2010    Dyspepsia 2012    History of angina 2020, Dr Herve Gayle; Cardiac cath negative, GI related;  [facility] , Cardiac Cath, reportedly mild CAD, Dr. Daniel Lee (left), changed to Dr. Naomie Jim    History of non-ST elevation myocardial infarction (NSTEMI) 3/24/2021    Mild nonobstructive CAD proximal LAD on left heart cath  CJW, Echo mild LVH, EF 60-65%Cardio Dr. Bart Peña    History of supraventricular tachycardia 3/24/2021    [facility] 3/3/21-3/6/21 S/p cardioversion w/ IV Adenosine. Stable on increased regimen of Toprol.  [facility] Dr. Emily Jefferson, planning on ablation for recurrent SVT    Hx of complete eye exam 07/06/2017    cataracts OU-return in one year-[address]    Hypercholesterolemia 11/2/2011    Lt Ankle edema, s/p surgery     Mild renal insufficiency 11/2/2011    Type 2 diabetes mellitus with stage 3a chronic kidney disease, without long-term current use of insulin ([facility] 11/11/2021    Diagnosed Type 2 NIDDM 5/2012, HgbA1c 6.5; CKD 2012; Dr Joaquin Heller al; Renal US 2/2013; follow up prn    Venous insufficiency of both lower extremities 12/28/2020    Venous stasis dermatitis of both lower extremities 12/28/2020    Vitamin D deficiency 2/28/2013 1/2013; weekly rx vit D per Nephrology      Past Surgical History:   Procedure Laterality Date    CARDIAC CATHETERIZATION  1/27/2012    normal, Dr Mary Kate Lopez, but given NTG just in case to use prn    HX BREAST BIOPSY Left     benign needle years ago    HX BUNIONECTOMY Right     ~2005    HX BUNIONECTOMY Left 12/15/2015    Dr. Emily Herman    HX COLONOSCOPY  06/23/2017    Dr Mart Hayes - follow up in 10 years    HX COLONOSCOPY  06/04/2012    Dr Mart Hayes, ok per pt except diverticulosis, small benign polyp; repeat 5 yrs    HX COLONOSCOPY  ~2000    normal, f/u 10 yrs; Dr Rigoberto Luque  x2    SAB x 1, AUB x1    HX HEART CATHETERIZATION  12/2019    Mild nonobstructive CAD proximal LAD, [facility] Dr. Gonzalez Daily KNEE REPLACEMENT  6/2010    Right TKR; Dr Marnie England    gallstones    HX ORTHOPAEDIC  1990's    left ankle surgery    HX PARTIAL HYSTERECTOMY  ~2000    AUB, benign disease    JARRELL MAMMOGRAM SCREEN DAVIDSON  [facility]     Current Outpatient Medications   Medication Sig Dispense Refill    spironolactone (ALDACTONE) 50 mg tablet Take 1 Tablet by mouth two (2) times a day. (Patient taking differently: Take 50 mg by mouth two (2) times a day. Most of the times she only takes once a day) 180 Tablet 0    metoprolol succinate (TOPROL-XL) 25 mg XL tablet TAKE ONE TABLET BY MOUTH  DAILY FOR HYPERTENSION 90 Tablet 0    isosorbide mononitrate ER (IMDUR) 30 mg tablet Take 30 mg by mouth daily. Per cardio      triamcinolone acetonide (KENALOG) 0.5 % ointment Apply  to affected area two (2) times a day. Use thin layer to dry, irritated areas on lower legs 45 g 3    TURMERIC PO Take  by mouth daily. diclofenac (VOLTAREN) 1 % gel Apply 2 g to affected area four (4) times daily as needed. 1 Each 1    cyanocobalamin 1,000 mcg tablet Take 1,000 mcg by mouth daily. acetaminophen (TYLENOL) 500 mg tablet Take 500 mg by mouth daily as needed for Pain. cholecalciferol (VITAMIN D3) (1000 Units /25 mcg) tablet Take  by mouth daily. aspirin 81 mg tablet Take 81 mg by mouth daily.        Allergies   Allergen Reactions    Ace Inhibitors Other (comments)     Renal insufficiency    Hydrochlorothiazide Other (comments)     Stopped d/t renal insufficiency, followed by Nephrologist      Norvasc [Amlodipine] Swelling     Increased leg swelling       Family History   Problem Relation Age of Onset    Hypertension Mother     OSTEOARTHRITIS Mother         B TKR    Arthritis-rheumatoid Mother         juvenile    Breast Cancer Mother 80    Other Mother         Diverticulitis    Hypertension Father     Diabetes Father          age 80 kidney failure; dialysis pt    Diabetes Sister     Cancer Brother          age 46 \"bone cancer\"    Alcohol abuse Brother          of cirrhosis age 58    Heart Disease Maternal Grandmother          at 80    Heart Disease Paternal Grandmother          at 80 Heart Attack Paternal Grandfather          in his 66's    Anesth Problems Neg Hx      Social History     Tobacco Use    Smoking status: Never    Smokeless tobacco: Never   Substance Use Topics    Alcohol use: Yes     Comment: rarely         Eric Dai MD

## 2022-11-14 NOTE — PROGRESS NOTES
Chief Complaint   Patient presents with    Annual Wellness Visit    Diabetes     Fasting follow up    Cholesterol Problem    Hypertension       HISTORY OF PRESENT ILLNESS   HPI  Fasting follow up Type 2 NIDDM, Stage 3a CKD, Hypertension, Hyperlipidemia, labs and medication check. Tolerating current medication regimen updated below. Does not check BS's. BP's had been stable. Diet: Nothing special  Exercise: No  Caffeine: 1 bottle of 16-20 oz of Pepsi a day;1 glass of sweet tea 3 x a week, 1 cup of coffee 2-3 x a month, occ hot tea or green tea  Weight: Staying in the 220's over time   REVIEW OF SYMPTOMS   Review of Systems   Constitutional: Negative. Eyes: Negative. Respiratory: Negative. Cardiovascular: Negative. Gastrointestinal: Negative. Neurological: Negative. Negative for tingling and sensory change. Endo/Heme/Allergies: Negative. Psychiatric/Behavioral: Negative. PROBLEM LIST/MEDICAL HISTORY     Problem List  Date Reviewed: 11/14/2022            Codes Class Noted    Type 2 diabetes mellitus with stage 3a chronic kidney disease, without long-term current use of insulin ([facility] ICD-10-CM: E11.22, N18.31  ICD-9-CM: 250.40, 585.3  11/11/2021    Overview Addendum 11/14/2022 10:26 AM by Ligia Kearns MD     Diagnosed Type 2 NIDDM 5/2012, HgbA1c 6.5; CKD 2012; Dr Jose A james; Renal US 2/2013; follow up prn; Eye doctor: [facility] [address]             History of supraventricular tachycardia ICD-10-CM: Z86.79  ICD-9-CM: V12.59  3/24/2021    Overview Addendum 11/14/2022 10:28 AM by Ligia Kearns MD     [employer] 3/3/21-3/6/21 S/p cardioversion w/ IV Adenosine. Stable on increased regimen of Toprol.  EP cardiology follow up 3/30/21 at [facility] w/ Dr. Yaniv FLYNN             History of non-ST elevation myocardial infarction (NSTEMI) ICD-10-CM: I25.2  ICD-9-CM: 412  3/24/2021    Overview Addendum 11/14/2022 10:29 AM by Ligia Kearns MD Mild nonobstructive CAD proximal LAD on left heart cath  CJW, Echo mild LVH, EF 60-65%Cardio Dr. Faviola Agustin             Venous insufficiency of both lower extremities ICD-10-CM: I87.2  ICD-9-CM: 459.81  2020        Venous stasis dermatitis of both lower extremities ICD-10-CM: I87.2  ICD-9-CM: 454.1  2020        History of angina ICD-10-CM: Z86.79  ICD-9-CM: V12.59  2020    Overview Addendum 2022 10:38 AM by Jackson Cristina MD     2012, Dr Katie Michelle; Cardiac cath negative, GI related; [facility] , Cardiac Cath, reportedly mild CAD, Dr. Faviola Agustin (left), changed to Dr. Maged Posey             ACP (advance care planning) ICD-10-CM: Z71.89  ICD-9-CM: V65.49  2017    Overview Addendum 2018 10:52 AM by Jackson Cristina MD     Initial ACP discussion . AMD form reviewed and copy provided. NN/facilitator to discuss with patient.  Discussion and info again                Benign essential hypertension ICD-10-CM: I10  ICD-9-CM: 401.1  2016    Overview Signed 2016 11:03 AM by Jackson Cristina MD     DX              Vitamin D deficiency ICD-10-CM: E55.9  ICD-9-CM: 268.9  2013    Overview Signed 2013  9:25 AM by Jackson Cristina MD     2013; weekly rx vit D per Nephrology             Dyspepsia & Heartburn ICD-10-CM: R10.13  ICD-9-CM: 536.8  2012    Overview Signed 2012  9:00 AM by Jackson Cristina MD     , cardiac w/u negative; improved w/ Pepcid             Hypercholesterolemia ICD-10-CM: E78.00  ICD-9-CM: 272.0  2011        Bilateral Dependent Foot & Ankle Edema, L>R ICD-10-CM: R60.9  ICD-9-CM: 782.3  2010         (normal spontaneous vaginal delivery) x3 ICD-10-CM: O80  ICD-9-CM: 650  Unknown    Overview Addendum 2022 10:33 AM by Jackson Cristina MD     SAB x 2;  x4, but 1 fetal demise after delivery at 7mos             S/P D&C (status post dilation and curettage) ICD-10-CM: L06.227  ICD-9-CM: V45.89  Unknown    Overview Signed 11/11/2010 12:08 PM by Meenu Duran MD     X1, SAB  x1, AUB             S/p laparoscopic cholecystectomy gallstones ICD-10-CM: Z90.49  ICD-9-CM: V45.89  Unknown    Overview Signed 5/3/2010 10:48 AM by Meenu Duran MD     1999             S/p Rt bunionectomy ICD-10-CM: Z98.890  ICD-9-CM: V45.89  Unknown        Lt Ankle edema, s/p surgery ICD-10-CM: M25.473  ICD-9-CM: 719.07  Unknown    Overview Signed 5/3/2010 10:49 AM by Meenu Duran MD     Early 4641'C                    PAST SURGICAL HISTORY     Past Surgical History:   Procedure Laterality Date    CARDIAC CATHETERIZATION  1/27/2012    normal, Dr Elvia Rodarte, but given NTG just in case to use prn    HX BREAST BIOPSY Left     benign needle years ago    HX BUNIONECTOMY Right     ~2005    HX BUNIONECTOMY Left 12/15/2015    Dr. Wendy Chadwick' [place]    HX COLONOSCOPY  06/23/2017    Dr Leonor Gresham - follow up in 10 years    HX COLONOSCOPY  06/04/2012    Dr Leonor Gresham, ok per pt except diverticulosis, small benign polyp; repeat 5 yrs    HX COLONOSCOPY  ~2000    normal, f/u 10 yrs; Dr Mian Owusu  x2    SAB x 1, AUB x1    HX HEART CATHETERIZATION  12/2019    Mild nonobstructive CAD proximal LAD, [facility] Dr. Sherrine Meckel KNEE REPLACEMENT  6/2010    Right TKR; Dr Edna Herrera    gallstones    HX ORTHOPAEDIC  1990's    left ankle surgery    HX PARTIAL HYSTERECTOMY  ~2000    AUB, benign disease    JARRELL MAMMOGRAM SCREEN BILAT  annually    [facility]         MEDICATIONS     Current Outpatient Medications   Medication Sig    spironolactone (ALDACTONE) 50 mg tablet Take 1 Tablet by mouth two (2) times a day. (Patient taking differently: Take 50 mg by mouth two (2) times a day.  Most of the times she only takes once a day)    metoprolol succinate (TOPROL-XL) 25 mg XL tablet TAKE ONE TABLET BY MOUTH  DAILY FOR HYPERTENSION    isosorbide mononitrate ER (IMDUR) 30 mg tablet Take 30 mg by mouth daily. Per cardio    triamcinolone acetonide (KENALOG) 0.5 % ointment Apply  to affected area two (2) times a day. Use thin layer to dry, irritated areas on lower legs    TURMERIC PO Take  by mouth daily. diclofenac (VOLTAREN) 1 % gel Apply 2 g to affected area four (4) times daily as needed. cyanocobalamin 1,000 mcg tablet Take 1,000 mcg by mouth daily. acetaminophen (TYLENOL) 500 mg tablet Take 500 mg by mouth daily as needed for Pain. cholecalciferol (VITAMIN D3) (1000 Units /25 mcg) tablet Take  by mouth daily. aspirin 81 mg tablet Take 81 mg by mouth daily. No current facility-administered medications for this visit. ALLERGIES     Allergies   Allergen Reactions    Ace Inhibitors Other (comments)     Renal insufficiency    Hydrochlorothiazide Other (comments)     Stopped d/t renal insufficiency, followed by Nephrologist      Norvasc [Amlodipine] Swelling     Increased leg swelling          SOCIAL HISTORY     Social History     Tobacco Use    Smoking status: Never    Smokeless tobacco: Never   Substance Use Topics    Alcohol use: Yes     Comment: rarely     Social History     Social History Narrative    , 3 sons, 4 grandchildren    Lives at home w/ her  in their trilevel home. Moved her elderly sister in w/ them.     Taking care of 7yo grandson whose mother  2019    Retired from [employer] at [address] [place] [address] Road: Nothing special    Exercise: No    Caffeine: 1 bottle of 16-20 oz of Pepsi a day;1 glass of sweet tea 3 x a week, 1 cup of coffee 2-3 x a month, occ hot tea or green tea    Weight: Staying in the 220's over time         Social History     Substance and Sexual Activity   Sexual Activity Yes    Partners: Male       IMMUNIZATIONS     Immunization History   Administered Date(s) Administered     Influenza, FLUZONE (age 72 y+), High Dose 2020    COVID-19, PFIZER PURPLE top, DILUTE for use, (age 15 y+), IM, 30mcg/0.3mL 2021, 2021, 2021, 04/15/2022    Influenza High Dose Vaccine PF 2015, 10/02/2017, 10/05/2018, 10/15/2022    Influenza, FLUAD, (age 72 y+), Adjuvanted 2021    Pneumococcal Conjugate (PCV-13) 2018    Pneumococcal Polysaccharide (PPSV-23) 2010    Tdap 2020    Zoster 10/12/2012         FAMILY HISTORY     Family History   Problem Relation Age of Onset    Hypertension Mother     OSTEOARTHRITIS Mother         B TKR    Arthritis-rheumatoid Mother         juvenile    Breast Cancer Mother 80    Other Mother         Diverticulitis    Hypertension Father     Diabetes Father          age 80 kidney failure; dialysis pt    Diabetes Sister     Cancer Brother          age 46 \"bone cancer\"    Alcohol abuse Brother          of cirrhosis age 58    Heart Disease Maternal Grandmother          at 80    Heart Disease Paternal Grandmother          at 80    Heart Attack Paternal Grandfather          in his 66's    Anesth Problems Neg Hx          VITALS   Visit Vitals  /74 (BP 1 Location: Left upper arm, BP Patient Position: Sitting, BP Cuff Size: Large adult)   Pulse 70   Temp 97.6 °F (36.4 °C) (Oral)   Resp 16   Ht 5' 5\" (1.651 m)   Wt 224 lb 6.4 oz (101.8 kg)   LMP 2000   SpO2 97%   BMI 37.34 kg/m²          PHYSICAL EXAMINATION   Physical Exam  Vitals reviewed. Constitutional:       General: She is not in acute distress. Neck:      Thyroid: No thyroid mass, thyromegaly or thyroid tenderness. Vascular: No carotid bruit. Cardiovascular:      Rate and Rhythm: Normal rate and regular rhythm. Pulses:           Dorsalis pedis pulses are 2+ on the right side and 2+ on the left side. Posterior tibial pulses are 2+ on the right side and 2+ on the left side. Heart sounds: Normal heart sounds. Pulmonary:      Effort: Pulmonary effort is normal.      Breath sounds: Normal breath sounds.    Abdominal: Palpations: Abdomen is soft. Tenderness: There is no abdominal tenderness. Musculoskeletal:         General: No tenderness. Cervical back: Neck supple. Feet:      Right foot:      Protective Sensation: 5 sites tested. 5 sites sensed. Skin integrity: Skin integrity normal.      Left foot:      Protective Sensation: 5 sites tested. 5 sites sensed. Skin integrity: Skin integrity normal.   Lymphadenopathy:      Cervical: No cervical adenopathy. Skin:     General: Skin is warm and dry. Neurological:      General: No focal deficit present. Mental Status: She is alert and oriented to person, place, and time. Mental status is at baseline. Cranial Nerves: No cranial nerve deficit. Motor: No weakness. Psychiatric:         Mood and Affect: Mood and affect normal.         Cognition and Memory: Cognition and memory normal.              LABORATORY DATA/ANCILLARY/IMAGING     Results for orders placed or performed in visit on 06/23/22   AMB POC HEMOGLOBIN A1C   Result Value Ref Range    Hemoglobin A1c (POC) 6.0 %             ASSESSMENT & PLAN   Diagnoses and all orders for this visit:    1. Medicare annual wellness visit, subsequent  See separate note under this same encounter visit for Medicare Wellness note. Reviewed diet, nutrition, exercise, weight management, BMI/goals. Age/risk based screening recommendations, health maintenance & prevention counseling. Cancer screening USPTFS guidelines reviewed w/ pt today. Discussed benefits/positive/negative outcomes of screening based on age/risk stratification. Informed consent for/against screening based on pt's personal hx/risk factors. Updated in history above and health maintenance.      2. Type 2 diabetes mellitus with stage 3a chronic kidney disease, without long-term current use of insulin (HCC)  Controlled, stable and at diabetic goal for age  -     HM DIABETES EYE EXAM: annually at her eye doctor  -      DIABETES FOOT EXAM: normal in office today  -     CBC W/O DIFF; Future  -     METABOLIC PANEL, COMPREHENSIVE; Future  -     LIPID PANEL; Future  -     HEMOGLOBIN A1C WITH EAG; Future  -     MICROALBUMIN, UR, RAND W/ MICROALB/CREAT RATIO; Future    3. Hypercholesterolemia  Not treated w/ statin medication, patient preference despite high 10 yr ASCVD Risk Score  Will reassess after review of today's fasting lipid panel  -     METABOLIC PANEL, COMPREHENSIVE; Future  -     LIPID PANEL; Future  -     TSH 3RD GENERATION; Future    4. Benign essential hypertension, stable  Continue current regimen  -     METABOLIC PANEL, COMPREHENSIVE; Future  -     LIPID PANEL; Future  -     TSH 3RD GENERATION; Future      Fasting labs checked today  Cardiovascular risk and specific lipid/LDL/BP/HgBA1c goals reviewed  Reviewed medications and side effects  Further follow up & other recommendations pending review of labs. Patient prefers to be contacted about their test results and recommendations via:  Letter mailed to home address.

## 2022-11-14 NOTE — PROGRESS NOTES
Chief Complaint   Patient presents with    Annual Wellness Visit    Cholesterol Problem     fasting    Diabetes    Hypertension     1. \"Have you been to the ER, urgent care clinic since your last visit? Hospitalized since your last visit? \" No    2. \"Have you seen or consulted any other health care providers outside of the [address] [place] [address] since your last visit? \" Ortho Dr Loulou Pérez inj In left knee      3. For patients aged 39-70: Has the patient had a colonoscopy / FIT/ Cologuard? Yes - no Care Gap present 6/2017 Dr Carole Roldan - follow up in 10 years      If the patient is female:    3. For patients aged 41-77: Has the patient had a mammogram within the past 2 years? Yes - no Care Gap present 1/2021 and scheduled for 1/2023      5. For patients aged 21-65: Has the patient had a pap smear?  NA - based on age or sex    [phone]   exam  Due foot

## 2022-11-19 ENCOUNTER — TELEPHONE (OUTPATIENT)
Dept: FAMILY MEDICINE CLINIC | Age: 77
End: 2022-11-19

## 2022-11-19 DIAGNOSIS — D72.829 LEUKOCYTOSIS, UNSPECIFIED TYPE: Primary | ICD-10-CM

## 2022-11-19 NOTE — TELEPHONE ENCOUNTER
Advise pt urine, liver, kidney, lytes, thyroid all ok/stable. HgbA1c remains at goal diabetic range and has even improved some from last check. LDL improved and at goal. Tg up some. Follow low carb/low cholesterol diet. Continue same medications for now. WBC mildly elevated. Has she recently been on prednisone or had a steroid injection? Recent illness? Repeat CBC w/ Diff in 4-6 weeks. No appt needed w/ me. No need to fast. Order pended, see me 6 months.

## 2022-11-22 DIAGNOSIS — R60.9 DEPENDENT EDEMA: ICD-10-CM

## 2022-11-22 DIAGNOSIS — I10 BENIGN ESSENTIAL HYPERTENSION: ICD-10-CM

## 2022-11-22 DIAGNOSIS — I87.2 VENOUS INSUFFICIENCY OF BOTH LOWER EXTREMITIES: ICD-10-CM

## 2022-11-23 RX ORDER — SPIRONOLACTONE 50 MG/1
TABLET, FILM COATED ORAL
Qty: 180 TABLET | Refills: 0 | Status: SHIPPED | OUTPATIENT
Start: 2022-11-23

## 2022-12-02 ENCOUNTER — PATIENT MESSAGE (OUTPATIENT)
Dept: FAMILY MEDICINE CLINIC | Age: 77
End: 2022-12-02

## 2022-12-02 NOTE — TELEPHONE ENCOUNTER
I have tried to reach pt several times. I have sent her a [employer] message and I am mailing out a letter.

## 2023-01-09 ENCOUNTER — HOSPITAL ENCOUNTER (OUTPATIENT)
Dept: MAMMOGRAPHY | Age: 78
Discharge: HOME | End: 2023-01-09
Attending: FAMILY MEDICINE
Payer: MEDICARE

## 2023-01-09 DIAGNOSIS — Z12.31 SCREENING MAMMOGRAM FOR HIGH-RISK PATIENT: ICD-10-CM

## 2023-01-09 PROCEDURE — 77063 BREAST TOMOSYNTHESIS BI: CPT

## 2023-01-10 DIAGNOSIS — I47.1 SUPRAVENTRICULAR TACHYCARDIA (HCC): ICD-10-CM

## 2023-01-10 DIAGNOSIS — Z86.79 HISTORY OF ANGINA: ICD-10-CM

## 2023-01-10 DIAGNOSIS — I10 BENIGN ESSENTIAL HYPERTENSION: ICD-10-CM

## 2023-01-10 DIAGNOSIS — I25.2 HISTORY OF NON-ST ELEVATION MYOCARDIAL INFARCTION (NSTEMI): ICD-10-CM

## 2023-01-10 RX ORDER — METOPROLOL SUCCINATE 25 MG/1
25 TABLET, EXTENDED RELEASE ORAL DAILY
Qty: 90 TABLET | Refills: 1 | Status: SHIPPED | OUTPATIENT
Start: 2023-01-10

## 2023-01-10 NOTE — TELEPHONE ENCOUNTER
Patient call for refill metoprolol to mailorder. Thanks, Jose Watson    Last Visit: 11/14/22 MD SALVADOR  Next Appointment: None  Previous Refill Encounter(s): 5/11/22 90    Requested Prescriptions     Pending Prescriptions Disp Refills    metoprolol succinate (TOPROL-XL) 25 mg XL tablet 90 Tablet 1     Sig: Take 1 Tablet by mouth daily. For hypertension     For Pharmacy Admin Tracking Only    Program: Medication Refill  CPA in place:   Recommendation Provided To:    Intervention Detail: New Rx: 1, reason: Patient Preference  Intervention Accepted By:   Bhavani Granados Closed?:   Time Spent (min): 10

## 2023-01-16 ENCOUNTER — TELEPHONE (OUTPATIENT)
Dept: FAMILY MEDICINE CLINIC | Age: 78
End: 2023-01-16

## 2023-01-16 NOTE — TELEPHONE ENCOUNTER
Patient called stated she has not received her metoprolol succinate 25 mg and wants to know if she could get some called in to [address] on [facility] order not received.     Best call back #[phone]

## 2023-01-19 ENCOUNTER — APPOINTMENT (OUTPATIENT)
Dept: FAMILY MEDICINE CLINIC | Age: 78
End: 2023-01-19

## 2023-01-19 DIAGNOSIS — D72.829 LEUKOCYTOSIS, UNSPECIFIED TYPE: ICD-10-CM

## 2023-01-19 NOTE — TELEPHONE ENCOUNTER
LVM for pt to either call me back or send [employer] message to let me know if she needed a 2 week supply of the metoprolol to cover her until she gets mail order. Refill was sent over on 1/10 [facility].

## 2023-01-20 ENCOUNTER — TELEPHONE (OUTPATIENT)
Dept: FAMILY MEDICINE CLINIC | Age: 78
End: 2023-01-20

## 2023-01-20 LAB
BASOPHILS # BLD: 0 K/UL (ref 0–0.1)
BASOPHILS NFR BLD: 0 % (ref 0–1)
DIFFERENTIAL METHOD BLD: ABNORMAL
EOSINOPHIL # BLD: 0.3 K/UL (ref 0–0.4)
EOSINOPHIL NFR BLD: 3 % (ref 0–7)
ERYTHROCYTE [DISTWIDTH] IN BLOOD BY AUTOMATED COUNT: 14.1 % (ref 11.5–14.5)
HCT VFR BLD AUTO: 44 % (ref 35–47)
HGB BLD-MCNC: 13 G/DL (ref 11.5–16)
IMM GRANULOCYTES # BLD AUTO: 0 K/UL (ref 0–0.04)
IMM GRANULOCYTES NFR BLD AUTO: 0 % (ref 0–0.5)
LYMPHOCYTES # BLD: 1.9 K/UL (ref 0.8–3.5)
LYMPHOCYTES NFR BLD: 19 % (ref 12–49)
MCH RBC QN AUTO: 29.5 PG (ref 26–34)
MCHC RBC AUTO-ENTMCNC: 29.5 G/DL (ref 30–36.5)
MCV RBC AUTO: 99.8 FL (ref 80–99)
MONOCYTES # BLD: 1.1 K/UL (ref 0–1)
MONOCYTES NFR BLD: 10 % (ref 5–13)
NEUTS SEG # BLD: 6.9 K/UL (ref 1.8–8)
NEUTS SEG NFR BLD: 68 % (ref 32–75)
NRBC # BLD: 0 K/UL (ref 0–0.01)
NRBC BLD-RTO: 0 PER 100 WBC
PLATELET # BLD AUTO: 354 K/UL (ref 150–400)
PMV BLD AUTO: 10.1 FL (ref 8.9–12.9)
RBC # BLD AUTO: 4.41 M/UL (ref 3.8–5.2)
WBC # BLD AUTO: 10.3 K/UL (ref 3.6–11)

## 2023-01-20 NOTE — LETTER
1/20/2023 4:25 PM    Ms. Pato Ramirez  [address] [facility] [address]      Sorry that I missed you. Dr Behzad Mosquera wanted me to let you know that your recent labs were normal.  Nothing more is needed. Keep follow appointment as planned.         Sincerely,    MJ for Aracelis Garcias MD

## 2023-01-20 NOTE — TELEPHONE ENCOUNTER
SAMREEN, patient does NOT use her MyChart. Looks like you have been trying to send her messages there but she wont ever get them. Sorry. But you can let her know her follow up WBC was back to normal. Nothing further needed at this time.

## 2023-01-27 ENCOUNTER — TELEPHONE (OUTPATIENT)
Dept: CARDIOLOGY CLINIC | Age: 78
End: 2023-01-27

## 2023-01-27 NOTE — LETTER
1/30/2023 4:33 PM    Ms. Tala Mckeon [facility]. [phone]     Vasile PalacioDao Randall (LEROY:[id number]) underwent a cardiac evaluation with our office and based on the available data she may move forward with her scheduled knee surgery on 02/23/23 with Dr. Alethea Alvarez. Thanks for giving us the opportunity to participate in the care of your patient.      Sincerely,      Markus Bueno MD

## 2023-01-27 NOTE — TELEPHONE ENCOUNTER
Patient is calling to request cardiac clearance for her upcoming knee surgery on 2.23.23 with Dr.Anthony Madnujano at [facility]. Please Advise.      Phone - [phone]    Fax - [phone]

## 2023-02-01 ENCOUNTER — OFFICE VISIT (OUTPATIENT)
Dept: FAMILY MEDICINE CLINIC | Age: 78
End: 2023-02-01
Payer: MEDICARE

## 2023-02-01 VITALS
OXYGEN SATURATION: 97 % | WEIGHT: 223.8 LBS | HEIGHT: 65 IN | SYSTOLIC BLOOD PRESSURE: 132 MMHG | HEART RATE: 79 BPM | BODY MASS INDEX: 37.29 KG/M2 | TEMPERATURE: 98.1 F | DIASTOLIC BLOOD PRESSURE: 80 MMHG | RESPIRATION RATE: 16 BRPM

## 2023-02-01 DIAGNOSIS — Z01.818 PREOP EXAMINATION: Primary | ICD-10-CM

## 2023-02-01 DIAGNOSIS — E78.00 HYPERCHOLESTEROLEMIA: ICD-10-CM

## 2023-02-01 DIAGNOSIS — I87.2 VENOUS INSUFFICIENCY OF BOTH LOWER EXTREMITIES: ICD-10-CM

## 2023-02-01 DIAGNOSIS — N18.31 TYPE 2 DIABETES MELLITUS WITH STAGE 3A CHRONIC KIDNEY DISEASE, WITHOUT LONG-TERM CURRENT USE OF INSULIN (HCC): ICD-10-CM

## 2023-02-01 DIAGNOSIS — I10 BENIGN ESSENTIAL HYPERTENSION: ICD-10-CM

## 2023-02-01 DIAGNOSIS — I25.2 HISTORY OF NON-ST ELEVATION MYOCARDIAL INFARCTION (NSTEMI): ICD-10-CM

## 2023-02-01 DIAGNOSIS — E11.22 TYPE 2 DIABETES MELLITUS WITH STAGE 3A CHRONIC KIDNEY DISEASE, WITHOUT LONG-TERM CURRENT USE OF INSULIN (HCC): ICD-10-CM

## 2023-02-01 DIAGNOSIS — Z86.79 HISTORY OF SUPRAVENTRICULAR TACHYCARDIA: ICD-10-CM

## 2023-02-01 DIAGNOSIS — M17.12 PRIMARY OSTEOARTHRITIS OF LEFT KNEE: ICD-10-CM

## 2023-02-01 PROCEDURE — 3079F DIAST BP 80-89 MM HG: CPT | Performed by: FAMILY MEDICINE

## 2023-02-01 PROCEDURE — 1101F PT FALLS ASSESS-DOCD LE1/YR: CPT | Performed by: FAMILY MEDICINE

## 2023-02-01 PROCEDURE — G8536 NO DOC ELDER MAL SCRN: HCPCS | Performed by: FAMILY MEDICINE

## 2023-02-01 PROCEDURE — G8510 SCR DEP NEG, NO PLAN REQD: HCPCS | Performed by: FAMILY MEDICINE

## 2023-02-01 PROCEDURE — 1090F PRES/ABSN URINE INCON ASSESS: CPT | Performed by: FAMILY MEDICINE

## 2023-02-01 PROCEDURE — G8399 PT W/DXA RESULTS DOCUMENT: HCPCS | Performed by: FAMILY MEDICINE

## 2023-02-01 PROCEDURE — G8427 DOCREV CUR MEDS BY ELIG CLIN: HCPCS | Performed by: FAMILY MEDICINE

## 2023-02-01 PROCEDURE — 1123F ACP DISCUSS/DSCN MKR DOCD: CPT | Performed by: FAMILY MEDICINE

## 2023-02-01 PROCEDURE — 99214 OFFICE O/P EST MOD 30 MIN: CPT | Performed by: FAMILY MEDICINE

## 2023-02-01 PROCEDURE — G8417 CALC BMI ABV UP PARAM F/U: HCPCS | Performed by: FAMILY MEDICINE

## 2023-02-01 PROCEDURE — 3075F SYST BP GE 130 - 139MM HG: CPT | Performed by: FAMILY MEDICINE

## 2023-02-01 NOTE — PROGRESS NOTES
Chief Complaint   Patient presents with    Pre-op Exam     Left TKR Dr. Osmani Suárez on 2/23/23       HISTORY OF PRESENT ILLNESS     PRE-OP H&P    Surgery: Left TKR    Date of Surgery: 2-    Surgeon: Dr. Osmani Suárez    Anesthesia: Spinal    Latex Allergy: No    Allergy or Reaction to Tapes or Adhesives: No    History of Reactions to Anesthesia: No    Family History of Anesthesia Reactions/Malignant Hyperthermia: No    ASA: 81 mg daily    Coumadin/Xarelto/Eliquis/Pradaxa: No    Nsaid's: No    Asthma/COPD/Pulmonary Disease: No    Bleeding Disorder: No    H/O DVT or Pulmonary Embolism: No    H/O MRSA: No     REVIEW OF SYMPTOMS   Review of Systems   Constitutional: Negative. Negative for chills and fever. HENT: Negative. Eyes: Negative. Respiratory: Negative. Negative for cough, sputum production, shortness of breath and wheezing. Cardiovascular:  Negative for chest pain, palpitations, orthopnea and PND. Gastrointestinal: Negative. Negative for abdominal pain, blood in stool, constipation, diarrhea, melena, nausea and vomiting. Genitourinary: Negative. Neurological: Negative. Endo/Heme/Allergies: Negative. Psychiatric/Behavioral: Negative. PROBLEM LIST/MEDICAL HISTORY     Problem List  Date Reviewed: 2/1/2023            Codes Class Noted    Type 2 diabetes mellitus with stage 3a chronic kidney disease, without long-term current use of insulin ([facility] ICD-10-CM: E11.22, N18.31  ICD-9-CM: 250.40, 585.3  11/11/2021    Overview Addendum 11/14/2022 10:26 AM by Graham Pierce MD     Diagnosed Type 2 NIDDM 5/2012, HgbA1c 6.5; CKD 2012; Dr Mulu [address] al; Renal US 2/2013; follow up prn; Eye doctor: Ugo Bedolla             History of supraventricular tachycardia ICD-10-CM: Z86.79  ICD-9-CM: V12.59  3/24/2021    Overview Addendum 11/14/2022 10:28 AM by Graham Pierce MD     PopUp 3/3/21-3/6/21 S/p cardioversion w/ IV Adenosine.  Stable on increased regimen of Toprol. EP cardiology follow up 3/30/21 at [facility] w/ Dr. Les Ashley EP             History of non-ST elevation myocardial infarction (NSTEMI) ICD-10-CM: I25.2  ICD-9-CM: 412  3/24/2021    Overview Addendum 2022 10:29 AM by Bryan Mcgee MD     Mild nonobstructive CAD proximal LAD on left heart cath  CJW, Echo mild LVH, EF 60-65%Cardio Dr. Carlos Saavedra             Venous insufficiency of both lower extremities ICD-10-CM: I87.2  ICD-9-CM: 459.81  2020        Venous stasis dermatitis of both lower extremities ICD-10-CM: I87.2  ICD-9-CM: 454.1  2020        History of angina ICD-10-CM: Z86.79  ICD-9-CM: V12.59  2020    Overview Addendum 2022 10:38 AM by Bryan Mcgee MD     2012, Dr Cong Nation; Cardiac cath negative, GI related; [facility] , Cardiac Cath, reportedly mild CAD, Dr. Carlos Saavedra (left), changed to Dr. Dayanna Mackenzie             ACP (advance care planning) ICD-10-CM: Z71.89  ICD-9-CM: V65.49  2017    Overview Addendum 2018 10:52 AM by Bryan Mcgee MD     Initial ACP discussion . AMD form reviewed and copy provided. NN/facilitator to discuss with patient.  Discussion and info again                Benign essential hypertension ICD-10-CM: I10  ICD-9-CM: 401.1  2016    Overview Signed 2016 11:03 AM by Bryan Mcgee MD     DX              Vitamin D deficiency ICD-10-CM: E55.9  ICD-9-CM: 268.9  2013    Overview Signed 2013  9:25 AM by Bryan Mcgee MD     2013; weekly rx vit D per Nephrology             Dyspepsia & Heartburn ICD-10-CM: R10.13  ICD-9-CM: 536.8  2012    Overview Signed 2012  9:00 AM by Bryan Mcgee MD     2012, cardiac w/u negative; improved w/ Pepcid             Hypercholesterolemia ICD-10-CM: E78.00  ICD-9-CM: 272.0  2011        Bilateral Dependent Foot & Ankle Edema, L>R ICD-10-CM: R60.9  ICD-9-CM: 782.3  2010         (normal spontaneous vaginal delivery) x3 ICD-10-CM: O80  ICD-9-CM: 650  Unknown    Overview Addendum 2022 10:33 AM by Yudi Hart MD     SAB x 2;  x4, but 1 fetal demise after delivery at 7mos             S/P D&C (status post dilation and curettage) ICD-10-CM: J66.883  ICD-9-CM: V45.89  Unknown    Overview Signed 2010 12:08 PM by Yudi Hart MD     X1, SAB  x1, AUB             S/p laparoscopic cholecystectomy gallstones ICD-10-CM: Z90.49  ICD-9-CM: V45.89  Unknown    Overview Signed 5/3/2010 10:48 AM by Yudi Hart MD     1999             S/p Rt bunionectomy ICD-10-CM: Z98.890  ICD-9-CM: V45.89  Unknown        Lt Ankle edema, s/p surgery ICD-10-CM: M25.473  ICD-9-CM: 719.07  Unknown    Overview Signed 5/3/2010 10:49 AM by Yudi Hart MD     Early 6319'D                    PAST SURGICAL HISTORY     Past Surgical History:   Procedure Laterality Date    CARDIAC CATHETERIZATION  2012    normal, Dr Jairo Cordoba, but given NTG just in case to use prn    HX BREAST BIOPSY Left     benign needle years ago    HX BUNIONECTOMY Right     ~    HX BUNIONECTOMY Left 12/15/2015    Dr. Kay Han    HX COLONOSCOPY  2017    Dr Robbi Schmitt - follow up in 10 years    HX COLONOSCOPY  2012    Dr Robbi Schmitt, ok per pt except diverticulosis, small benign polyp; repeat 5 yrs    HX COLONOSCOPY  ~    normal, f/u 10 yrs; Dr Tran Rota  x2    SAB x 1, AUB x1    HX HEART CATHETERIZATION  2019    Mild nonobstructive CAD proximal LAD, [facility] Dr. Toño Ploanco KNEE REPLACEMENT  2010    Right TKR; Dr Rodrigue Mcgee    gallstones    HX ORTHOPAEDIC  's    left ankle surgery    HX PARTIAL HYSTERECTOMY  ~    AUB, benign disease    JARRELL MAMMOGRAM SCREEN BILAT  annually    [facility]         MEDICATIONS     Current Outpatient Medications   Medication Sig    docosahexaenoic acid/epa ([address] OIL PO) Take  by mouth. Takes 2-3 x a week    ubidecarenone (CO Q-10 PO) Take  by mouth. Takes 2-3 x a week    ascorbic acid (VITAMIN C PO) Take 500 mg by mouth daily. metoprolol succinate (TOPROL-XL) 25 mg XL tablet Take 1 Tablet by mouth daily. For hypertension    spironolactone (ALDACTONE) 50 mg tablet TAKE 1 TABLET TWICE DAILY (Patient taking differently: Take 25 mg by mouth daily. Only takes once a day will take second dose if she has swelling)    isosorbide mononitrate ER (IMDUR) 30 mg tablet Take 30 mg by mouth daily. Per cardio    triamcinolone acetonide (KENALOG) 0.5 % ointment Apply  to affected area two (2) times a day. Use thin layer to dry, irritated areas on lower legs    TURMERIC PO Take  by mouth daily. diclofenac (VOLTAREN) 1 % gel Apply 2 g to affected area four (4) times daily as needed. cyanocobalamin 1,000 mcg tablet Take 1,000 mcg by mouth daily. acetaminophen (TYLENOL) 500 mg tablet Take 500 mg by mouth daily as needed for Pain. cholecalciferol (VITAMIN D3) (1000 Units /25 mcg) tablet Take  by mouth daily. aspirin 81 mg tablet Take 81 mg by mouth daily. No current facility-administered medications for this visit. ALLERGIES     Allergies   Allergen Reactions    Ace Inhibitors Other (comments)     Renal insufficiency    Hydrochlorothiazide Other (comments)     Stopped d/t renal insufficiency, followed by Nephrologist      Norvasc [Amlodipine] Swelling     Increased leg swelling          SOCIAL HISTORY     Social History     Tobacco Use    Smoking status: Never    Smokeless tobacco: Never   Substance Use Topics    Alcohol use: Yes     Comment: rarely     Social History     Social History Narrative    , 3 sons, 4 grandchildren    Lives at home w/ her  in their trilevel home. Moved her elderly sister in w/ them.     Taking care of 9yo grandson whose mother  2019    Retired from [employer] at [address]: Nothing special Exercise: No    Caffeine: 1 bottle of 16-20 oz of Pepsi a day;1 glass of sweet tea 3 x a week, 1 cup of coffee 2-3 x a month, occ hot tea or green tea    Weight: Staying in the 220's over time         Social History     Substance and Sexual Activity   Sexual Activity Yes    Partners: Male       IMMUNIZATIONS     Immunization History   Administered Date(s) Administered     Influenza, FLUZONE (age 72 y+), High Dose 2020    COVID-19, PFIZER PURPLE top, DILUTE for use, (age 15 y+), IM, 30mcg/0.3mL 2021, 2021, 2021, 04/15/2022    Influenza High Dose Vaccine PF 2015, 10/02/2017, 10/05/2018, 10/15/2022    Influenza, FLUAD, (age 72 y+), Adjuvanted 2021    Pneumococcal Conjugate (PCV-13) 2018    Pneumococcal Polysaccharide (PPSV-23) 2010    Tdap 2020    Zoster 10/12/2012         FAMILY HISTORY     Family History   Problem Relation Age of Onset    Hypertension Mother     OSTEOARTHRITIS Mother         B TKR    Arthritis-rheumatoid Mother         juvenile    Breast Cancer Mother 80    Other Mother         Diverticulitis    Hypertension Father     Diabetes Father          age 80 kidney failure; dialysis pt    Diabetes Sister     Cancer Brother          age 46 \"bone cancer\"    Alcohol abuse Brother          of cirrhosis age 58    Heart Disease Maternal Grandmother          at 80    Heart Disease Paternal Grandmother          at 80    Heart Attack Paternal Grandfather          in his 66's    Anesth Problems Neg Hx          VITALS   Visit Vitals  /80 (BP 1 Location: Left upper arm, BP Patient Position: Sitting, BP Cuff Size: Large adult)   Pulse 79   Temp 98.1 °F (36.7 °C) (Oral)   Resp 16   Ht 5' 5\" (1.651 m)   Wt 223 lb 12.8 oz (101.5 kg)   LMP 2000   SpO2 97%   BMI 37.24 kg/m²          PHYSICAL EXAMINATION   Physical Exam  Vitals reviewed. Constitutional:       General: She is not in acute distress. Appearance: She is obese.  She is not ill-appearing. HENT:      Right Ear: Tympanic membrane normal.      Left Ear: Tympanic membrane normal.      Mouth/Throat:      Mouth: Mucous membranes are moist.      Pharynx: Oropharynx is clear. No oropharyngeal exudate or posterior oropharyngeal erythema. Eyes:      Conjunctiva/sclera: Conjunctivae normal.   Neck:      Thyroid: No thyroid mass, thyromegaly or thyroid tenderness. Vascular: No carotid bruit. Cardiovascular:      Rate and Rhythm: Normal rate and regular rhythm. Heart sounds: Normal heart sounds. Pulmonary:      Effort: Pulmonary effort is normal. No respiratory distress. Breath sounds: Normal breath sounds. No wheezing or rales. Abdominal:      Palpations: Abdomen is soft. Tenderness: There is no abdominal tenderness. Musculoskeletal:         General: No tenderness. Cervical back: Full passive range of motion without pain and neck supple. Comments: Mild-moderate B ankle edema, non-pitting, skin warm/dry/intact. Lymphadenopathy:      Cervical: No cervical adenopathy. Skin:     General: Skin is warm and dry. Neurological:      General: No focal deficit present. Mental Status: She is alert and oriented to person, place, and time. Mental status is at baseline.       Comments: Gait slow, steady using walking cane   Psychiatric:         Mood and Affect: Mood normal.              LABORATORY DATA/ANCILLARY/IMAGING         Lab Results   Component Value Date/Time    Hemoglobin A1c 6.2 (H) 11/14/2022 10:58 AM    Hemoglobin A1c 6.3 (H) 11/11/2021 10:52 AM    Hemoglobin A1c 6.1 (H) 01/18/2021 11:54 AM    Glucose 111 (H) 11/14/2022 10:58 AM    Microalbumin/Creat ratio (mg/g creat) 21 11/14/2022 11:07 AM    Microalbumin,urine random 2.44 11/14/2022 11:07 AM    LDL, calculated 87.6 11/14/2022 10:58 AM    Creatinine 1.09 (H) 11/14/2022 10:58 AM      Lab Results   Component Value Date/Time    Cholesterol, total 180 11/14/2022 10:58 AM    HDL Cholesterol 62 11/14/2022 10:58 AM    LDL, calculated 87.6 11/14/2022 10:58 AM    Triglyceride 152 (H) 11/14/2022 10:58 AM    CHOL/HDL Ratio 2.9 11/14/2022 10:58 AM     Lab Results   Component Value Date/Time    Sodium 141 11/14/2022 10:58 AM    Potassium 4.1 11/14/2022 10:58 AM    Chloride 106 11/14/2022 10:58 AM    CO2 31 11/14/2022 10:58 AM    Anion gap 4 (L) 11/14/2022 10:58 AM    Glucose 111 (H) 11/14/2022 10:58 AM    BUN 19 11/14/2022 10:58 AM    Creatinine 1.09 (H) 11/14/2022 10:58 AM    BUN/Creatinine ratio 17 11/14/2022 10:58 AM    GFR est AA 59 (L) 11/11/2021 10:52 AM    GFR est non-AA 49 (L) 11/11/2021 10:52 AM    Calcium 9.1 11/14/2022 10:58 AM    Bilirubin, total 0.3 11/14/2022 10:58 AM    ALT (SGPT) 18 11/14/2022 10:58 AM    Alk. phosphatase 120 (H) 11/14/2022 10:58 AM    Protein, total 7.5 11/14/2022 10:58 AM    Albumin 3.8 11/14/2022 10:58 AM    Globulin 3.7 11/14/2022 10:58 AM    A-G Ratio 1.0 (L) 11/14/2022 10:58 AM          ASSESSMENT & PLAN     Preop H&P:    Surgery: Left TKR    Date of Surgery: 2-    Surgeon: Dr. Escobar Lopez    Anesthesia: Spinal    Primary Diagnosis/Indication for Surgery:    Primary osteoarthritis of left knee      Secondary Diagnoses:    Benign essential hypertension  Controlled, stable. Continue Toprol XL 25 mg daily, Aldactone 50 mg 1-2 x a day    Type 2 diabetes mellitus with stage 3a chronic kidney disease, without long-term current use of insulin (HCC)  Controlled, stable and at diabetic goal for age w/o anti-hyperglycemic medication  Most recent HgbA1c 6.2  Renal fnc stable w/ most recent Cr 1.09 and GFR 59  No proteinuria. Not on ARB. Intolerant of ACEI. Hypercholesterolemia  LDL 87, HDL 62. Not treated w/ statin cholesterol lowering medication per patient preference    History of non-ST elevation myocardial infarction (NSTEMI) & History of supraventricular tachycardia  Asymptomatic. Stable. Followed q 6 months by cardiology.    Dr. Tamra Bonilla sent letter of cardiac clearance to Dr. Jensen Console on 1/30/23. I have included a copy of that report w/ today's H&P as well. She is on 81 mg asa daily and was advised at her PAT to hold x 3 days  Continue Toprol XL 25 mg daily and Imdur ER 30 mg daily    Venous insufficiency of both lower extremities  Controlled, stable. Continue regimen of Aldactone 50 mg 1-2 x a day  KVNG  Support stockings   Work on weight reduction      Pre-Operative Risk Assessment Using 2014 ACC/AHA Guidelines     Emergent procedure: No  Active Cardiac Condition including decompensated HF, Arrhythmia, MI <3 weeks, severe valve disease: No  Risk Level of Procedure: Intermediate Risk (intraperitoneal, intrathoracic, HENT, orthopedic, or carotid endarterectomy, etc.)  Revised Cardiac Risk Index Risk factors: History of ischemic heart disease (+1)  Measurement of Exercise Tolerance before Surgery >4 METS (climbing > 1 flight of stairs without stopping, walking up hill > 1-2 blocks, scrubbing floors, moving furniture, golf, bowling, dancing or tennis, or running short distance): Unknown    According to the 2014 ACC/AHA pre-operative risk assessment guidelines Marci Sanchez is at low risk for major cardiac complications during a Intermediate Risk procedure, exercise tolerance is unknown  and procedure may proceed as planned. Specific medication recommendations are listed below.     Patient states she had pre-op labs done 1-30-23 and will be having EKG at hospital.    Medication Recommendations:  Hold all vitamins and OTC supplements x 5-7 days prior to surgery  Hold 81 mg ASA x 3 days prior (Patient states she was advised to do so at [facility])  Betablocker Continue the day of surgery            Time of Enc: 30\"

## 2023-02-01 NOTE — PROGRESS NOTES
Chief Complaint   Patient presents with    Pre-op Exam     TKR left knee Dr Yolis Monge on 2/23/23     1. \"Have you been to the ER, urgent care clinic since your last visit? Hospitalized since your last visit? \" No    2. \"Have you seen or consulted any other health care providers outside of the [address] [place] [address] since your last visit? \" Yes ortho Dr Mallika Ag    3. For patients aged 39-70: Has the patient had a colonoscopy / FIT/ Cologuard? Yes - no Care Gap present 6/2017 Dr Malia Johnson - follow up in 10 years      If the patient is female:    3. For patients aged 41-77: Has the patient had a mammogram within the past 2 years? Yes - no Care Gap present      5. For patients aged 21-65: Has the patient had a pap smear?  NA - based on age or sex

## 2023-02-27 ENCOUNTER — HOME HEALTH ADMISSION (OUTPATIENT)
Dept: HOME HEALTH SERVICES | Facility: HOME HEALTH | Age: 78
End: 2023-02-27
Payer: MEDICARE

## 2023-02-28 ENCOUNTER — HOME CARE VISIT (OUTPATIENT)
Dept: SCHEDULING | Facility: HOME HEALTH | Age: 78
End: 2023-02-28
Payer: MEDICARE

## 2023-02-28 PROCEDURE — 400018 HH-NO PAY CLAIM PROCEDURE

## 2023-02-28 PROCEDURE — G0151 HHCP-SERV OF PT,EA 15 MIN: HCPCS

## 2023-02-28 NOTE — HOME HEALTH
Skilled reason for admission/summary of clinical condition: [de-identified] yo female with hospitalization 2/23-2/24/23 for OA left knee; s/p left TKR:  admitted to home care for PT; PMH significant for: DM2, HTN, elevated cholesterol, Vit D def  Diagnosis: left TKR  Subjective: pt states she feels well and is anxious to start PT    Caregiver: spouse, neice;. Caregiver assists with: ADLs, IADLs, transportation;  Caregiver unable to assist with: n/a;  Caregiver is available 24/7; Caregiver is present at this visit and did participate with clinician. Medications reconciled and all medications are available in the home this visit. The following education was provided regarding medications: medication interactions and look alike medications: n/a. Patient able to demonstrate knowledge through teach back with 100 percent accuracy. Medications are effective at this time. MD notified of any discrepancies/medication interactions n/a;. A list of reconciled medications has been given to the patient/caregiver and a copy has been uploaded to media. YES    Home health supplies by type and quantity ordered/delivered this visit include: n/a  Patient/caregiver instructed on plan of care and are agreeable to plan of care at this time. Clinician reviewed orientation to home health booklet with patient/caregiver including agency phone number, agency complaint process, state hotline number, as well as joint commission's quality hotline number. Consent forms signed. YES    Patient at risk for falls    YES   Recommended requesting PT/OT orders due to fall risk  done   Patient response to recommended requesting of PT/OT orders:     Discharge planning discussed with patient and caregiver. Discharge planning as follows: pt to remain at home with assist of caregiver and guidance from MD:  Patient/caregiver did verbalize agreement with discharge planning.      Clinical Assessment (What this means for the patient overall and need for ongoing skilled care): pt lives with elderly spouse in small two story home that is clean and free of clutter; there are 4 steps between main floor and den and a ramp to exit; master bedroom and bath are on the first floor; pt's cheryl is present and assists in her care; pt is limited to short distance ambulation with r. walker; she reports mod knee pain; her incision is closed with prineo mesh; there is no drainage; strength and ROM limited right LE; PLOF the patient was ind in community ambulation with [facility]; will follow MD referral orders of PT 3w4 for standard TKR protocol; rehab potential is good to achieve goals    Written Teaching Material Utilized: written/pictoral HEP;  fall prevention in admission handbook    Specific plan for next visit: continue ther ex in TKR protocol and gait training on level stairs    Plan of care and admission to home health status called to attending physician. The following practitioner has agreed to sign the ongoing POC  [facility], and was notified of the following: per referral PT 3w4    Interdisciplinary communication with: n/a    PCP: Law Khoury  Next scheduled doctor appointment: 3/09/71 with Amira Petersen; Patient/Caregiver instructed to keep follow up appointment because lack of follow through with physician appointments could result in discontinuation of home care services for non-compliance. Patient/Caregiver verbalize knowledge of above through teach back with 100 percent accuracy. Emergency Preparedness: Patient/Caregiver instructed in the following:  Have one gallon of water per person for at least 3 days on hand. Have non-perishable food for at least 3 days that do not need to be cooked. Have flashlights and batteries. Charge your cell phones and any back up lithium batteries for your cell phones. Have 3+ days of back up oxygen in your home. Have a phone in your home that is hard wired and does not require power. Have medication for a week in your home.   Make sure you have a caregiver in the home to provide care in case your home health nurse cannot get to your house. Make sure you have all of your paperwork i.e. written emergency preparedness plan, Identification, insurance cards, DME phone number, physician and pharmacy phone number, agency phone number, and your medications in one place for easy access and in a zip lock bag to protect them. Take your Admission Handbook, written emergency preparedness plan, written medication list and folder if you relocate in the event of an emergency, if possible. Call agency if you relocate so we can contact you. Patient/Caregiver verbalize knowledge of above through teach back with 100 percent accuracy.

## 2023-03-01 ENCOUNTER — HOME CARE VISIT (OUTPATIENT)
Dept: SCHEDULING | Facility: HOME HEALTH | Age: 78
End: 2023-03-01
Payer: MEDICARE

## 2023-03-01 VITALS
TEMPERATURE: 97.4 F | DIASTOLIC BLOOD PRESSURE: 70 MMHG | SYSTOLIC BLOOD PRESSURE: 118 MMHG | HEART RATE: 80 BPM | RESPIRATION RATE: 14 BRPM | OXYGEN SATURATION: 96 %

## 2023-03-01 PROCEDURE — G0157 HHC PT ASSISTANT EA 15: HCPCS

## 2023-03-02 VITALS
TEMPERATURE: 97 F | RESPIRATION RATE: 16 BRPM | SYSTOLIC BLOOD PRESSURE: 152 MMHG | OXYGEN SATURATION: 99 % | HEART RATE: 80 BPM | DIASTOLIC BLOOD PRESSURE: 78 MMHG

## 2023-03-03 ENCOUNTER — HOME CARE VISIT (OUTPATIENT)
Dept: HOME HEALTH SERVICES | Facility: HOME HEALTH | Age: 78
End: 2023-03-03
Payer: MEDICARE

## 2023-03-03 ENCOUNTER — TELEPHONE (OUTPATIENT)
Dept: FAMILY MEDICINE CLINIC | Age: 78
End: 2023-03-03

## 2023-03-03 NOTE — TELEPHONE ENCOUNTER
Caleb from [facility] called. Stated we received referral for home health wants to know if the provider will follow and sign orders.      BCB# [phone]- [id number]

## 2023-03-05 NOTE — HOME HEALTH
Subjective: I am trying. My knee is alright. Falls since last visit NO(if yes complete the Fall Tracking Form and include bsrifallreport):  Caregiver involvement changes: none  Home health supplies by type and quantity ordered/delivered this visit include: na    Clinician asked if patient has had any physician contact since last home care visit and patient states: NO  Clinician asked if patient has any new or changed medications and patient states:  NO   If Yes, were medications reconciled? N/A reviewed  Was the certifying physician notified of changes in medications? N/A     Clinical assessment (what this visit means for the patient overall and need for ongoing skilled care) and progress or lack of progress towards SPECIFIC goals: Patient with left TKA with good pain control and HEP progression with written program in place. Patient with pain management and med review. GAit training with cues for improved knee flexion and step through    Written Teaching Material Utilized: hep    Interdisciplinary communication with:  PT for the purpose of POC collaboration    Discharge planning as follows: Is no longer homebound, Per physician order, Will discharge when the patient has reached their maximum functional potential and maximum safety in their home and When goals are met    Specific plan for next visit: Increase strengthe and ROM    Signature deferred due to concerns of Covid 19 in community.

## 2023-03-05 NOTE — HOME HEALTH
Finally reached patient after numerous calls and messages. Patient states she is in [facility] as she ent to bathroom last night and heard a pop in her knee. Patient states she has had increased pain and inability to weight bear on knee since that time and Dr Martín Agrawal instructed her to go to ER. Pt reported that tests came back negative and that MD said it may have been an internal stitch that she felt pop. Patient unsure of plan as she can not put weight on LLE. PT and Sup notified.

## 2023-03-07 ENCOUNTER — HOME CARE VISIT (OUTPATIENT)
Dept: HOME HEALTH SERVICES | Facility: HOME HEALTH | Age: 78
End: 2023-03-07
Payer: MEDICARE

## 2023-03-08 ENCOUNTER — HOME CARE VISIT (OUTPATIENT)
Dept: SCHEDULING | Facility: HOME HEALTH | Age: 78
End: 2023-03-08
Payer: MEDICARE

## 2023-03-08 VITALS
SYSTOLIC BLOOD PRESSURE: 120 MMHG | TEMPERATURE: 97 F | RESPIRATION RATE: 18 BRPM | OXYGEN SATURATION: 95 % | HEART RATE: 68 BPM | DIASTOLIC BLOOD PRESSURE: 68 MMHG

## 2023-03-08 PROCEDURE — G0299 HHS/HOSPICE OF RN EA 15 MIN: HCPCS

## 2023-03-08 NOTE — HOME HEALTH
Skilled reason for admission/summary of clinical condition: Left TKR admitted to home care for SN. Nursing needed for med management, pain management, incision management. Diagnosis: L TKR  Subjective: patient reports knee pain  Caregiver: relative. Caregiver assists with: Meals, Bathing, ADL, IADL, Transportation and Housekeeping Caregiver unable to assist with: Medications and Wound care. Caregiver is available 24 hours/day Caregiver is present at this visit and did not participate with clinician. Medications reconciled and all medications are available in the home this visit. The following education was provided regarding medications: medication interactions and look alike medications: High alert medication teaching on ultram, Opioid medication education Purpose, dose, and frequency, Proper storage Store in the original packaging, Proper disposal Return unused medication to the pharmacy, if possible. and Side effects such as dizziness, sleepiness, constipation, nausea, vomiting, itchiness, and dry mouth High alert medication teaching on aspirin, antiplatelet therapy education;purpose, dose, and frequency and signs and symptoms of abnormal bleeding such as unexplained bruising, dizziness/lightheadedness, red or tarry looking stool, blood in urine, blood in vomit  Patient/CG able to demonstrate knowledge through teach back with 75 percent accuracy. Medications are effective at this time. MD notified of any discrepancies/medication interactions na. A list of reconciled medications has been given to the patient/caregiver and a copy has been uploaded to media. Home health supplies by type and quantity ordered/delivered this visit include: na  Patient/caregiver instructed on plan of care and are agreeable to plan of care at this time.     Clinician reviewed orientation to home health booklet with patient/caregiver including agency phone number, agency complaint process, state hotline number, as well as [facility] quality hotline number. Consent forms signed. Patient at risk for falls Yes:   Recommended requesting PT/OT orders due to fall risk YES: patient currently sleeping in recliner as bedroom is upstairs and is not using stairs at present. Patient response to recommended requesting of PT/OT orders: agreable    Discharge planning discussed with patient and caregiver. Discharge planning as follows: When goals are met Patient/caregiver did verbalize agreement with discharge planning. Clinical Assessment (What this means for the patient overall and need for ongoing skilled care):Patient will continue to benefit from skilled nursing services for med management, pain management, incision management. Written Teaching Material Utilized: admission packet, med list    Specific plan for next visit: Instruct caregiver/patient in incision and pain management    Plan of care and admission to home health status called to attending physician. The following practitioner has agreed to sign the ongoing Jessi Moser MD, and was notified of the following: visit frequency of 1w4,3prm, requesting additional services as follows: pt/ot    Interdisciplinary communication with:  Physician for the purpose of  admission notification    PCP: Caron Yin MD  Next scheduled doctor appointment: 4/21/23  Patient/Caregiver instructed to keep follow up appointment because lack of follow through with physician appointments could result in discontinuation of home care services for non-compliance. Patient/Caregiver verbalize knowledge of above through teach back with 100 percent accuracy. Emergency Preparedness: Patient/Caregiver instructed in the following:  Have one gallon of water per person for at least 3 days on hand. Have non-perishable food for at least 3 days that do not need to be cooked. Have flashlights and batteries. Charge your cell phones and any back up lithium batteries for your cell phones.   Have 3+ days of back up oxygen in your home. Have a phone in your home that is hard wired and does not require power. Have medication for a week in your home. Make sure you have a caregiver in the home to provide care in case your home health nurse cannot get to your house. Make sure you have all of your paperwork i.e. written emergency preparedness plan, Identification, insurance cards, DME phone number, physician and pharmacy phone number, agency phone number, and your medications in one place for easy access and in a zip lock bag to protect them. Take your Admission Handbook, written emergency preparedness plan, written medication list, necessary supplies and folder if you relocate in the event of an emergency, if possible. Call agency if you relocate so we can contact you. Patient/Caregiver verbalize knowledge of above through teach back with 100 percent accuracy.

## 2023-03-09 ENCOUNTER — HOME CARE VISIT (OUTPATIENT)
Dept: SCHEDULING | Facility: HOME HEALTH | Age: 78
End: 2023-03-09
Payer: MEDICARE

## 2023-03-09 VITALS
HEART RATE: 83 BPM | DIASTOLIC BLOOD PRESSURE: 72 MMHG | RESPIRATION RATE: 14 BRPM | OXYGEN SATURATION: 96 % | TEMPERATURE: 97.8 F | SYSTOLIC BLOOD PRESSURE: 124 MMHG

## 2023-03-09 VITALS
OXYGEN SATURATION: 96 % | HEART RATE: 83 BPM | SYSTOLIC BLOOD PRESSURE: 124 MMHG | DIASTOLIC BLOOD PRESSURE: 72 MMHG | TEMPERATURE: 97.8 F

## 2023-03-09 PROCEDURE — G0151 HHCP-SERV OF PT,EA 15 MIN: HCPCS

## 2023-03-09 PROCEDURE — G0152 HHCP-SERV OF OT,EA 15 MIN: HCPCS

## 2023-03-09 NOTE — HOME HEALTH
Skilled reason for admission/summary of clinical condition: [de-identified] yo female with  rehospitalization 3/03-3/05/23 for severe pain left and inability to bear weight; s/p left TKR 2/23/23;: admitted to home care for PT; PMH significant for: DM2, HTN, elevated cholesterol, Vit D def     Diagnosis: left TKR     Subjective: pt states the pain in her left knee is much better; she states  told her \"it was probably a stich that broke and caused all of her pain\"  Caregiver: spouse, cheryl;. Caregiver assists with: IADLs, transportation; Caregiver unable to assist with: n/a; Caregiver is available 24/7; Caregiver is present at this visit and did participate with clinician. Medications reconciled and all medications are available in the home this visit. no changes reporeted    Home health supplies by type and quantity ordered/delivered this visit include: n/a     Patient/caregiver instructed on plan of care and are agreeable to plan of care at this time. Clinician reviewed orientation to home health booklet with patient/caregiver including agency phone number, agency complaint process, state hotline number, as well as [facility]'s quality hotline number. Computer signed. YES     Patient at risk for falls YES   Recommended requesting PT/OT orders due to fall risk done   Patient response to recommended requesting of PT/OT orders:     Discharge planning discussed with patient and caregiver. Discharge planning as follows: pt to remain at home with assist of caregiver and guidance from MD:   Patient/caregiver did verbalize agreement with discharge planning.      Clinical Assessment (What this means for the patient overall and need for ongoing skilled care): PT resumed on this patient following DC from the hospital for severe pain left knee; pt now able to ambulate in her home with walker on levels and stairs; pain well controlled with meds; still mod swelling throughout left LE; pt able to resume HEP today in TKR protocol in supine and sitting; will resume orders at 2w1,3w2; pt has appt with Sandy Olivas later today    Written Teaching Material Utilized: written/pictoral HEP; fall prevention in admission handbook     Specific plan for next visit: resume ther ex in TKR protocol and gait training on level stairs    Interdisciplinary communication with: note sent to Scottie Robbins PTA who will see patient next visit     PCP: Ifeanyi Lowe   Next scheduled doctor appointment: today with Sandy Olivas; Patient/Caregiver instructed to keep follow up appointment because lack of follow through with physician appointments could result in discontinuation of home care services for non-compliance. Patient/Caregiver verbalize knowledge of above through teach back with 100 percent accuracy.

## 2023-03-10 ENCOUNTER — HOME CARE VISIT (OUTPATIENT)
Dept: SCHEDULING | Facility: HOME HEALTH | Age: 78
End: 2023-03-10
Payer: MEDICARE

## 2023-03-10 PROCEDURE — G0157 HHC PT ASSISTANT EA 15: HCPCS

## 2023-03-10 NOTE — HOME HEALTH
Skilled reason for admission/summary of clinical condition: Pt was initially admitted to [facility] PT s/p L TKR; was rehospitalized for severe knee pain; SN and OT added onto [facility] orders  Diagnosis: DM2, HTN, HLD, OA  Subjective: \"I'm doing much better\"  Caregiver:  Caregiver assists with: Meals, Transportation, IADL's;  Caregiver unable to assist with: NA. Caregiver is available Regularly Caregiver is not present at this visit and did not participate with clinician. Medications reconciled and all medications are available in the home this visit. The following education was provided regarding medications: medication interactions and look alike medications: take all medications as prescribed;. Patient/CG able to demonstrate knowledge through teach back with 100 percent accuracy. Medications are effective at this time. Home health supplies by type and quantity ordered/delivered this visit include: Long handled shoe horn  Patient/caregiver instructed on plan of care and are agreeable to plan of care at this time. Patient at risk for falls Yes:   Recommended requesting PT/OT orders due to fall risk YES:   Patient response to recommended requesting of PT/OT orders: agreeable    Discharge planning discussed with patient and caregiver. Discharge planning as follows: OT eval only; Patient/caregiver did verbalize agreement with discharge planning. Clinical Assessment (What this means for the patient overall and need for ongoing skilled care):Pt is an [de-identified] yo female initially admitted to [facility] PT s/p L TKR; was rehospitalized for severe knee pain; determined to be a broken stitch; [facility] to [facility] with SN and OT orders added. Pt lives in a tri-level home with adult son and ; her  is currently in SNF rehab. Pt is spending most of her time on the ground level of home; is able to go up short staircase ( 4 steps ) to access kitchen. Pt is sleeping in a recliner; has a full bath on ground level.  Pt is dressing with mod ind; spongebathing only until she can navigate 14 steps to access walk in shower. Modified Barthel Index Score of 90/100, indicating ind in ADL's. OT recommended a walker tray for assistance with meal prep;  provided a long handled shoe horn for assistance with donning shoes. OT eval only; no further skilled occupational therapy intervention is indicated. Written Teaching Material Utilized: Reviewed information in admission packet    Interdisciplinary communication with: Tello Davis PT for the purpose of POC collaboration    PCP: Dr. Dean Marlow  Next scheduled doctor appointment: this afternoon with Dr. Davion Balderas; Patient/Caregiver instructed to keep follow up appointment because lack of follow through with physician appointments could result in discontinuation of home care services for non-compliance. Patient/Caregiver verbalize knowledge of above through teach back with 100 percent accuracy. Emergency Preparedness: Patient/Caregiver instructed in the following:  Have one gallon of water per person for at least 3 days on hand. Have non-perishable food for at least 3 days that do not need to be cooked. Have flashlights and batteries. Charge your cell phones and any back up lithium batteries for your cell phones. Have 3+ days of back up oxygen in your home. Have a phone in your home that is hard wired and does not require power. Have medication for a week in your home. Make sure you have a caregiver in the home to provide care in case your home health nurse cannot get to your house. Make sure you have all of your paperwork i.e. written emergency preparedness plan, Identification, insurance cards, DME phone number, physician and pharmacy phone number, agency phone number, and your medications in one place for easy access and in a zip lock bag to protect them.   Take your Admission Handbook, written emergency preparedness plan, written medication list, necessary supplies and folder if you relocate in the event of an emergency, if possible. Call agency if you relocate so we can contact you. Patient/Caregiver verbalize knowledge of above through teach back with 100 percent accuracy.     signature declined d/t covid concerns

## 2023-03-11 VITALS
DIASTOLIC BLOOD PRESSURE: 78 MMHG | OXYGEN SATURATION: 97 % | TEMPERATURE: 97.9 F | HEART RATE: 64 BPM | RESPIRATION RATE: 16 BRPM | SYSTOLIC BLOOD PRESSURE: 132 MMHG

## 2023-03-11 NOTE — HOME HEALTH
Subjective: I feel the cold in my joints today. Falls since last visit NO(if yes complete the Fall Tracking Form and include bsrifallreport):  Caregiver involvement changes: none  Home health supplies by type and quantity ordered/delivered this visit include: na    Clinician asked if patient has had any physician contact since last home care visit and patient states: NO  Clinician asked if patient has any new or changed medications and patient states:  NO   If Yes, were medications reconciled? N/A reviewed  Was the certifying physician notified of changes in medications? N/A     Clinical assessment (what this visit means for the patient overall and need for ongoing skilled care) and progress or lack of progress towards SPECIFIC goals: Patient with TKA with instructions to increase amb and ther ex. Patient with ROM to 88 degrees flexion today. Written Teaching Material Utilized: HEP    Interdisciplinary communication with: N/A     Discharge planning as follows: Per physician order, When patient is no longer able to participate or progresses to SNF/Hospice, Will discharge when the patient has reached their maximum functional potential and maximum safety in their home and When goals are met    Specific plan for next visit: Increase strength and ROM     Signature deferred due to concerns of Covid 19 in community.   88 degrees

## 2023-03-13 ENCOUNTER — HOME CARE VISIT (OUTPATIENT)
Dept: SCHEDULING | Facility: HOME HEALTH | Age: 78
End: 2023-03-13
Payer: MEDICARE

## 2023-03-13 VITALS
HEART RATE: 82 BPM | TEMPERATURE: 98 F | SYSTOLIC BLOOD PRESSURE: 128 MMHG | OXYGEN SATURATION: 97 % | DIASTOLIC BLOOD PRESSURE: 82 MMHG

## 2023-03-13 PROCEDURE — G0157 HHC PT ASSISTANT EA 15: HCPCS

## 2023-03-13 PROCEDURE — G0300 HHS/HOSPICE OF LPN EA 15 MIN: HCPCS

## 2023-03-13 NOTE — HOME HEALTH
Subjective: I have some pain in my knee  Falls since last visit NO(if yes complete the Fall Tracking Form and include bsrifallreport):   Caregiver involvement changes: n/a  Home health supplies by type and quantity ordered/delivered this visit include: n/a    Clinician asked if patient has had any physician contact since last home care visit and patient states: NO  Clinician asked if patient has any new or changed medications and patient states:  NO   If Yes, were medications reconciled? N/A   Was the certifying physician notified of changes in medications? N/A     Clinical assessment (what this visit means for the patient overall and need for ongoing skilled care) and progress or lack of progress towards SPECIFIC goals: Pt is at risk for hospitalization r/t fall risk and wound d/t knee replacement. SN to continue with wound care, assessment, med managment. Education goals not met    Written Teaching Material Utilized: N/A    Interdisciplinary communication with: N/A   Discharge planning as follows:  When goals are met    Specific plan for next visit: Med managment/Pain management

## 2023-03-14 VITALS
RESPIRATION RATE: 16 BRPM | DIASTOLIC BLOOD PRESSURE: 78 MMHG | SYSTOLIC BLOOD PRESSURE: 128 MMHG | OXYGEN SATURATION: 97 % | TEMPERATURE: 98 F | HEART RATE: 82 BPM

## 2023-03-14 NOTE — HOME HEALTH
Subjective: I am getting around a little better. Falls since last visit NO(if yes complete the Fall Tracking Form and include bsrifallreport):  Caregiver involvement changes: no changes  Home health supplies by type and quantity ordered/delivered this visit include: na    Clinician asked if patient has had any physician contact since last home care visit and patient states: NO  Clinician asked if patient has any new or changed medications and patient states:  NO   If Yes, were medications reconciled? N/A reviewed  Was the certifying physician notified of changes in medications? N/A     Clinical assessment (what this visit means for the patient overall and need for ongoing skilled care) and progress or lack of progress towards SPECIFIC goals: left TKA with Progressing ROM and HEP in place for strengthening and to improve amb in home with stair training with patient able to manage FWW today    Written Teaching Material Utilized: HEP in sitting, standing and supine    Interdisciplinary communication with:  PT for the purpose of POC collaboration    Discharge planning as follows: Is no longer homebound, Per physician order, Will discharge when the patient has reached their maximum functional potential and maximum safety in their home and When goals are met    Specific plan for next visit: Progress ROM and strengthening    Signature deferred due to concerns of Covid 19 in community.

## 2023-03-15 ENCOUNTER — HOME CARE VISIT (OUTPATIENT)
Dept: SCHEDULING | Facility: HOME HEALTH | Age: 78
End: 2023-03-15
Payer: MEDICARE

## 2023-03-15 VITALS
HEART RATE: 85 BPM | OXYGEN SATURATION: 97 % | RESPIRATION RATE: 16 BRPM | TEMPERATURE: 97.9 F | SYSTOLIC BLOOD PRESSURE: 132 MMHG | DIASTOLIC BLOOD PRESSURE: 78 MMHG

## 2023-03-15 PROCEDURE — G0157 HHC PT ASSISTANT EA 15: HCPCS

## 2023-03-15 NOTE — HOME HEALTH
Subjective: I am doing a little better  Falls since last visit NO(if yes complete the Fall Tracking Form and include bsrifallreport):  Caregiver involvement changes: none  Home health supplies by type and quantity ordered/delivered this visit include: na    Clinician asked if patient has had any physician contact since last home care visit and patient states: NO  Clinician asked if patient has any new or changed medications and patient states:  NO   If Yes, were medications reconciled? N/A reviewed  Was the certifying physician notified of changes in medications? N/A     Clinical assessment (what this visit means for the patient overall and need for ongoing skilled care) and progress or lack of progress towards SPECIFIC goals: Patient with left TKA with steady progress with ROM and strengthening and progressing with gait training which is limited by limited space. Written Teaching Material Utilized: N/A    Interdisciplinary communication with:  PT for the purpose of POC collaboration    Discharge planning as follows: Is no longer homebound, Per physician order, Will discharge when the patient has reached their maximum functional potential and maximum safety in their home and When goals are met    Specific plan for next visit: ROM and strengthening    Signature deferred due to concerns of Covid 19 in community.     stairs x 3  discussed cane next week  94 degrees

## 2023-03-17 ENCOUNTER — HOME CARE VISIT (OUTPATIENT)
Dept: SCHEDULING | Facility: HOME HEALTH | Age: 78
End: 2023-03-17
Payer: MEDICARE

## 2023-03-17 VITALS
OXYGEN SATURATION: 98 % | RESPIRATION RATE: 14 BRPM | HEART RATE: 60 BPM | SYSTOLIC BLOOD PRESSURE: 118 MMHG | TEMPERATURE: 97.6 F | DIASTOLIC BLOOD PRESSURE: 62 MMHG

## 2023-03-17 PROCEDURE — G0151 HHCP-SERV OF PT,EA 15 MIN: HCPCS

## 2023-03-19 NOTE — HOME HEALTH
Subjective: pt reports she has been doing \"some of the exercises\"; she reports she is walking \"some in her home with walker\"  Falls since last visit  NO  (if yes complete the Fall Tracking Form and include bsrifallreport):  Caregiver involvement changes: none  Home health supplies by type and quantity ordered/delivered this visit include: n/a    Clinician asked if patient has had any physician contact since last home care visit and patient states: NO  Clinician asked if patient has any new or changed medications and patient states:  NO  If Yes, were medications reconciled?  n/a  Was the certifying physician notified of changes in medications? n/a    Clinical assessment (what this visit means for the patient overall and need for ongoing skilled care) and progress or lack of progress towards SPECIFIC goals:  doubtful compliance with HEP as patient does not know the program of 6 exercises without constant vcs for proper form; pt is not walking enough; goals met for use of ice, s/s of DVT, high risk meds, s/s of surgical site infection    Written Teaching Material Utilized: written/pictoral HEP    Interdisciplinary communication with: Anderson Beasley PTA for the purpose of collaboration;     Discharge planning as follows: pt to remain at home with assist of caregiver and guidance from MD    Specific plan for next visit: continue ther ex in TKR protocol with focus on knee flexion    REASSESSMENT: pt progressing slowly with PT; min compliance with HEP; will continue PT 3w1 starting 3/16/23 to complete original orders on referral; pt has f/u appt with  3/30/23

## 2023-03-20 ENCOUNTER — HOME CARE VISIT (OUTPATIENT)
Dept: SCHEDULING | Facility: HOME HEALTH | Age: 78
End: 2023-03-20
Payer: MEDICARE

## 2023-03-20 VITALS
SYSTOLIC BLOOD PRESSURE: 120 MMHG | OXYGEN SATURATION: 98 % | TEMPERATURE: 98.1 F | DIASTOLIC BLOOD PRESSURE: 82 MMHG | HEART RATE: 71 BPM

## 2023-03-20 PROCEDURE — G0300 HHS/HOSPICE OF LPN EA 15 MIN: HCPCS

## 2023-03-20 PROCEDURE — G0157 HHC PT ASSISTANT EA 15: HCPCS

## 2023-03-20 NOTE — HOME HEALTH
Subjective: I dont have any pain right now, usually if I do have pain its at night and it comes on quick like a sharp pain. Falls since last visit NO(if yes complete the Fall Tracking Form and include bsrifallreport):   Caregiver involvement changes: n/a  Home health supplies by type and quantity ordered/delivered this visit include: n/a    Clinician asked if patient has had any physician contact since last home care visit and patient states: NO  Clinician asked if patient has any new or changed medications and patient states:  NO   If Yes, were medications reconciled? N/A   Was the certifying physician notified of changes in medications? N/A     Clinical assessment (what this visit means for the patient overall and need for ongoing skilled care) and progress or lack of progress towards SPECIFIC goals: SN to continue with education and monitoring of s/s of infection. Education goals not met. Written Teaching Material Utilized: N/A    Interdisciplinary communication with: N/A     Discharge planning as follows: When goals are met    Specific plan for next visit: Progress patient to discharge.

## 2023-03-21 VITALS
SYSTOLIC BLOOD PRESSURE: 124 MMHG | DIASTOLIC BLOOD PRESSURE: 80 MMHG | RESPIRATION RATE: 16 BRPM | OXYGEN SATURATION: 99 % | HEART RATE: 77 BPM | TEMPERATURE: 97.2 F

## 2023-03-21 NOTE — HOME HEALTH
Subjective: I am doing alright - hanging around that 5/10. Falls since last visit NO(if yes complete the Fall Tracking Form and include bsrifallreport):  Caregiver involvement changes: no changes  Home health supplies by type and quantity ordered/delivered this visit include: na    Clinician asked if patient has had any physician contact since last home care visit and patient states: NO  Clinician asked if patient has any new or changed medications and patient states:  NO   If Yes, were medications reconciled? N/A reviewed  Was the certifying physician notified of changes in medications? N/A     Clinical assessment (what this visit means for the patient overall and need for ongoing skilled care) and progress or lack of progress towards SPECIFIC goals: Patient with TKA and improving ROM with continued c/o pain. Patient with trial of cane with gait training today with good safety. Increasing ROM. Written Teaching Material Utilized: HEP    Interdisciplinary communication with:  PT for the purpose of POC update    Discharge planning as follows: Is no longer homebound, Per physician order, Will discharge when the patient has reached their maximum functional potential and maximum safety in their home and When goals are met    Specific plan for next visit: Progressive gait training and ROM    Signature deferred due to concerns of Covid 19 in community.

## 2023-03-22 ENCOUNTER — HOME CARE VISIT (OUTPATIENT)
Dept: HOME HEALTH SERVICES | Facility: HOME HEALTH | Age: 78
End: 2023-03-22
Payer: MEDICARE

## 2023-03-24 ENCOUNTER — HOME CARE VISIT (OUTPATIENT)
Dept: SCHEDULING | Facility: HOME HEALTH | Age: 78
End: 2023-03-24
Payer: MEDICARE

## 2023-03-24 PROCEDURE — G0157 HHC PT ASSISTANT EA 15: HCPCS

## 2023-03-26 VITALS
DIASTOLIC BLOOD PRESSURE: 70 MMHG | HEART RATE: 82 BPM | TEMPERATURE: 98.4 F | SYSTOLIC BLOOD PRESSURE: 120 MMHG | RESPIRATION RATE: 16 BRPM | OXYGEN SATURATION: 96 %

## 2023-03-27 ENCOUNTER — HOME CARE VISIT (OUTPATIENT)
Dept: SCHEDULING | Facility: HOME HEALTH | Age: 78
End: 2023-03-27
Payer: MEDICARE

## 2023-03-27 VITALS
RESPIRATION RATE: 16 BRPM | SYSTOLIC BLOOD PRESSURE: 124 MMHG | TEMPERATURE: 97.5 F | DIASTOLIC BLOOD PRESSURE: 80 MMHG | HEART RATE: 82 BPM | OXYGEN SATURATION: 97 %

## 2023-03-27 PROCEDURE — G0157 HHC PT ASSISTANT EA 15: HCPCS

## 2023-03-27 NOTE — HOME HEALTH
Subjective: I am doing better. A little tired today. Falls since last visit NO(if yes complete the Fall Tracking Form and include bsrifallreport):  Caregiver involvement changes: none  Home health supplies by type and quantity ordered/delivered this visit include: na    Clinician asked if patient has had any physician contact since last home care visit and patient states: NO  Clinician asked if patient has any new or changed medications and patient states:  NO   If Yes, were medications reconciled? N/A reviewed  Was the certifying physician notified of changes in medications? N/A     Clinical assessment (what this visit means for the patient overall and need for ongoing skilled care) and progress or lack of progress towards SPECIFIC goals: Patient with TKA with continued progress with increased ROM and improved gait with SPC. Patient progressing with ROM and continues to require gait training to normalize gait pattern with knee flexion and equal step length. Written Teaching Material Utilized: N/A    Interdisciplinary communication with:  PT for the purpose of POC collaboration    Discharge planning as follows: Is no longer homebound, Per physician order, Will discharge when the patient has reached their maximum functional potential and maximum safety in their home and When goals are met    Specific plan for next visit: Progress with ROM and gait training. Patient to follow up with ortho on Thursday. Signature deferred due to concerns of Covid 19 in community.

## 2023-03-27 NOTE — Clinical Note
Goals met for  Fall prevention with pt with good knowledge of strategies to prevent falls  Safe ambulation for stairs and levels

## 2023-03-28 ENCOUNTER — HOME CARE VISIT (OUTPATIENT)
Dept: SCHEDULING | Facility: HOME HEALTH | Age: 78
End: 2023-03-28
Payer: MEDICARE

## 2023-03-28 VITALS
HEART RATE: 75 BPM | SYSTOLIC BLOOD PRESSURE: 136 MMHG | OXYGEN SATURATION: 96 % | RESPIRATION RATE: 18 BRPM | DIASTOLIC BLOOD PRESSURE: 78 MMHG

## 2023-03-28 PROCEDURE — G0299 HHS/HOSPICE OF RN EA 15 MIN: HCPCS

## 2023-03-28 NOTE — Clinical Note
Pt. educated throughout 4 visits on med regimen, pain management, incision care, infection prevention, HTN and fall prevention. Incision d/i, no s/sx infection noted, picture taken at today's visit and uploaded to [address] [place] [address]. Pt. has met educational goals. No further visits are needed from . Pt. discharged from [facility] 3/28.

## 2023-03-28 NOTE — HOME HEALTH
Subjective:I am trying to do more and I am walking with the cane  Falls since last visit NO(if yes complete the Fall Tracking Form and include bsrifallreport):  Caregiver involvement changes: none  Home health supplies by type and quantity ordered/delivered this visit include: na    Clinician asked if patient has had any physician contact since last home care visit and patient states: NO  Clinician asked if patient has any new or changed medications and patient states:  NO   If Yes, were medications reconciled? N/A reviewed  Was the certifying physician notified of changes in medications? N/A     Clinical assessment (what this visit means for the patient overall and need for ongoing skilled care) and progress or lack of progress towards SPECIFIC goals: Patient with recent TKA with good progress with increased ROM and progressing to [facility] for gait training. Progressing with stair training and gait with focus on increased knee flexion and heel strike with equal step length. Written Teaching Material Utilized: written HEP in place    Interdisciplinary communication with: N/A     Discharge planning as follows: Is no longer homebound, Per physician order, Will discharge when the patient has reached their maximum functional potential and maximum safety in their home and When goals are met    Specific plan for next visit: Progress in prep for out patient PT upon dc    Signature deferred due to concerns of Covid 19 in community.

## 2023-03-29 ENCOUNTER — HOME CARE VISIT (OUTPATIENT)
Dept: SCHEDULING | Facility: HOME HEALTH | Age: 78
End: 2023-03-29
Payer: MEDICARE

## 2023-03-29 VITALS
HEART RATE: 75 BPM | DIASTOLIC BLOOD PRESSURE: 70 MMHG | OXYGEN SATURATION: 98 % | RESPIRATION RATE: 16 BRPM | TEMPERATURE: 97.5 F | SYSTOLIC BLOOD PRESSURE: 120 MMHG

## 2023-03-29 DIAGNOSIS — Z86.79 HISTORY OF ANGINA: ICD-10-CM

## 2023-03-29 DIAGNOSIS — I25.2 HISTORY OF NON-ST ELEVATION MYOCARDIAL INFARCTION (NSTEMI): ICD-10-CM

## 2023-03-29 DIAGNOSIS — I47.1 SUPRAVENTRICULAR TACHYCARDIA (HCC): ICD-10-CM

## 2023-03-29 DIAGNOSIS — I10 BENIGN ESSENTIAL HYPERTENSION: ICD-10-CM

## 2023-03-29 PROCEDURE — G0157 HHC PT ASSISTANT EA 15: HCPCS

## 2023-03-29 RX ORDER — METOPROLOL SUCCINATE 25 MG/1
TABLET, EXTENDED RELEASE ORAL
Qty: 90 TABLET | Refills: 1 | OUTPATIENT
Start: 2023-03-29

## 2023-03-29 NOTE — HOME HEALTH
Subjective: I see the ortho on Thursday  Falls since last visit NO(if yes complete the Fall Tracking Form and include bsrifallreport):  Caregiver involvement changes: none  Home health supplies by type and quantity ordered/delivered this visit include: na    Clinician asked if patient has had any physician contact since last home care visit and patient states: NO  Clinician asked if patient has any new or changed medications and patient states:  NO   If Yes, were medications reconciled? N/A reviewed  Was the certifying physician notified of changes in medications? N/A     Clinical assessment (what this visit means for the patient overall and need for ongoing skilled care) and progress or lack of progress towards SPECIFIC goals: Patient s/p LTKA with steady progress with increasing ROM and strength with ongoing ther ex and stretches. Progresive gait training with patient with [facility] for safety. Written Teaching Material Utilized: N/A    Interdisciplinary communication with: PT for POC collaboration    Discharge planning as follows: Is no longer homebound, Per physician order, Will discharge when the patient has reached their maximum functional potential and maximum safety in their home and When goals are met    Specific plan for next visit: PT to follow    Signature deferred due to concerns of Covid 19 in community.

## 2023-03-31 ENCOUNTER — HOME CARE VISIT (OUTPATIENT)
Dept: SCHEDULING | Facility: HOME HEALTH | Age: 78
End: 2023-03-31
Payer: MEDICARE

## 2023-03-31 PROCEDURE — G0157 HHC PT ASSISTANT EA 15: HCPCS

## 2023-04-02 VITALS
HEART RATE: 87 BPM | SYSTOLIC BLOOD PRESSURE: 122 MMHG | DIASTOLIC BLOOD PRESSURE: 80 MMHG | OXYGEN SATURATION: 97 % | RESPIRATION RATE: 17 BRPM | TEMPERATURE: 98.2 F

## 2023-04-02 NOTE — HOME HEALTH
Subjective: Next follow up on 4/17/23  Falls since last visit NO(if yes complete the Fall Tracking Form and include bsrifallreport):  Caregiver involvement changes: none  Home health supplies by type and quantity ordered/delivered this visit include: na    Clinician asked if patient has had any physician contact since last home care visit and patient states: YES  Clinician asked if patient has any new or changed medications and patient states:  NO   If Yes, were medications reconciled? N/A reviewed  Was the certifying physician notified of changes in medications? N/A     Clinical assessment (what this visit means for the patient overall and need for ongoing skilled care) and progress or lack of progress towards SPECIFIC goals: Patient with Margjosephineyovaniary Harman with orders to continue with PT for ROM and strengthening. Progressive ROM and stretching program and ther ex in supine, sitting and standing with good teachback and progressive ther ex provided    Written Teaching Material Utilized: HEP    Interdisciplinary communication with: Alban Lucas. PT for the purpose of POC collaboration    Discharge planning as follows: Is no longer homebound, Per physician order, Will discharge when the patient has reached their maximum functional potential and maximum safety in their home and When goals are met    Specific plan for next visit: PT to follow    Signature deferred due to concerns of Covid 19 in community.

## 2023-04-03 ENCOUNTER — HOME CARE VISIT (OUTPATIENT)
Dept: SCHEDULING | Facility: HOME HEALTH | Age: 78
End: 2023-04-03
Payer: MEDICARE

## 2023-04-03 PROCEDURE — G0157 HHC PT ASSISTANT EA 15: HCPCS

## 2023-04-05 ENCOUNTER — HOME CARE VISIT (OUTPATIENT)
Dept: SCHEDULING | Facility: HOME HEALTH | Age: 78
End: 2023-04-05
Payer: MEDICARE

## 2023-04-05 PROCEDURE — G0157 HHC PT ASSISTANT EA 15: HCPCS

## 2023-04-05 NOTE — HOME HEALTH
Subjective: I am trying to do more, but I still just can't stand long. Falls since last visit NO(if yes complete the Fall Tracking Form and include bsrifallreport):  Caregiver involvement changes: none  Home health supplies by type and quantity ordered/delivered this visit include: na    Clinician asked if patient has had any physician contact since last home care visit and patient states: NO  Clinician asked if patient has any new or changed medications and patient states:  NO   If Yes, were medications reconciled? NO reviewed  Was the certifying physician notified of changes in medications? na    Clinical assessment (what this visit means for the patient overall and need for ongoing skilled care) and progress or lack of progress towards goals:Patient with TKA with community gait training today to return to [facility] with further training required. PRogressive strengthening and ROM    Written Teaching Material Utilized: N/A    Interdisciplinary communication with: N/A     Discharge planning as follows: Is no longer homebound, Per physician order, Will discharge when the patient has reached their maximum functional potential and maximum safety in their home and When goals are met    Specific plan for next visit: Progress strengthening     Signature deferred due to concerns of Covid 19 in community.

## 2023-04-07 ENCOUNTER — HOME CARE VISIT (OUTPATIENT)
Dept: SCHEDULING | Facility: HOME HEALTH | Age: 78
End: 2023-04-07
Payer: MEDICARE

## 2023-04-26 DIAGNOSIS — I10 BENIGN ESSENTIAL HYPERTENSION: ICD-10-CM

## 2023-04-26 DIAGNOSIS — I87.2 VENOUS INSUFFICIENCY OF BOTH LOWER EXTREMITIES: ICD-10-CM

## 2023-04-26 DIAGNOSIS — R60.9 DEPENDENT EDEMA: ICD-10-CM

## 2023-04-26 RX ORDER — SPIRONOLACTONE 50 MG/1
TABLET, FILM COATED ORAL
Qty: 180 TABLET | Refills: 1 | Status: SHIPPED | OUTPATIENT
Start: 2023-04-26

## 2023-05-16 ENCOUNTER — ANCILLARY PROCEDURE (OUTPATIENT)
Age: 78
End: 2023-05-16
Payer: MEDICARE

## 2023-05-16 ENCOUNTER — OFFICE VISIT (OUTPATIENT)
Age: 78
End: 2023-05-16
Payer: MEDICARE

## 2023-05-16 VITALS
HEART RATE: 67 BPM | HEIGHT: 65 IN | SYSTOLIC BLOOD PRESSURE: 122 MMHG | OXYGEN SATURATION: 99 % | WEIGHT: 218 LBS | DIASTOLIC BLOOD PRESSURE: 80 MMHG | BODY MASS INDEX: 36.32 KG/M2

## 2023-05-16 VITALS
HEIGHT: 65 IN | DIASTOLIC BLOOD PRESSURE: 80 MMHG | SYSTOLIC BLOOD PRESSURE: 122 MMHG | WEIGHT: 218 LBS | BODY MASS INDEX: 36.32 KG/M2

## 2023-05-16 DIAGNOSIS — E66.01 SEVERE OBESITY (BMI 35.0-39.9) WITH COMORBIDITY (HCC): ICD-10-CM

## 2023-05-16 DIAGNOSIS — R06.09 DOE (DYSPNEA ON EXERTION): ICD-10-CM

## 2023-05-16 DIAGNOSIS — I48.91 ATRIAL FIBRILLATION (HCC): ICD-10-CM

## 2023-05-16 DIAGNOSIS — I87.2 VENOUS STASIS DERMATITIS OF BOTH LOWER EXTREMITIES: ICD-10-CM

## 2023-05-16 DIAGNOSIS — I10 HTN (HYPERTENSION): ICD-10-CM

## 2023-05-16 DIAGNOSIS — N18.31 TYPE 2 DIABETES MELLITUS WITH STAGE 3A CHRONIC KIDNEY DISEASE, WITHOUT LONG-TERM CURRENT USE OF INSULIN (HCC): Primary | ICD-10-CM

## 2023-05-16 DIAGNOSIS — Z98.890 S/P ABLATION OF ATRIAL FIBRILLATION: ICD-10-CM

## 2023-05-16 DIAGNOSIS — I10 BENIGN ESSENTIAL HYPERTENSION: ICD-10-CM

## 2023-05-16 DIAGNOSIS — Z86.79 S/P ABLATION OF ATRIAL FIBRILLATION: ICD-10-CM

## 2023-05-16 DIAGNOSIS — I48.91 ATRIAL FIBRILLATION, UNSPECIFIED TYPE (HCC): ICD-10-CM

## 2023-05-16 DIAGNOSIS — E11.22 TYPE 2 DIABETES MELLITUS WITH STAGE 3A CHRONIC KIDNEY DISEASE, WITHOUT LONG-TERM CURRENT USE OF INSULIN (HCC): Primary | ICD-10-CM

## 2023-05-16 DIAGNOSIS — M25.473 ANKLE EDEMA: ICD-10-CM

## 2023-05-16 LAB
ECHO AO ASC DIAM: 3.9 CM
ECHO AO ASCENDING AORTA INDEX: 1.9 CM/M2
ECHO AO ROOT DIAM: 3.4 CM
ECHO AO ROOT INDEX: 1.66 CM/M2
ECHO AV MEAN GRADIENT: 4 MMHG
ECHO AV MEAN VELOCITY: 0.9 M/S
ECHO AV PEAK GRADIENT: 6 MMHG
ECHO AV PEAK VELOCITY: 1.2 M/S
ECHO AV VELOCITY RATIO: 0.58
ECHO AV VTI: 30.5 CM
ECHO BSA: 2.13 M2
ECHO EST RA PRESSURE: 3 MMHG
ECHO LA DIAMETER INDEX: 1.66 CM/M2
ECHO LA DIAMETER: 3.4 CM
ECHO LA TO AORTIC ROOT RATIO: 1
ECHO LA VOL 2C: 56 ML (ref 22–52)
ECHO LA VOL 4C: 45 ML (ref 22–52)
ECHO LA VOL BP: 51 ML (ref 22–52)
ECHO LA VOL/BSA BIPLANE: 25 ML/M2 (ref 16–34)
ECHO LA VOLUME AREA LENGTH: 56 ML
ECHO LA VOLUME INDEX A2C: 27 ML/M2 (ref 16–34)
ECHO LA VOLUME INDEX A4C: 22 ML/M2 (ref 16–34)
ECHO LA VOLUME INDEX AREA LENGTH: 27 ML/M2 (ref 16–34)
ECHO LV E' LATERAL VELOCITY: 8 CM/S
ECHO LV E' SEPTAL VELOCITY: 6 CM/S
ECHO LV FRACTIONAL SHORTENING: 32 % (ref 28–44)
ECHO LV INTERNAL DIMENSION DIASTOLE INDEX: 1.66 CM/M2
ECHO LV INTERNAL DIMENSION DIASTOLIC: 3.4 CM (ref 3.9–5.3)
ECHO LV INTERNAL DIMENSION SYSTOLIC INDEX: 1.12 CM/M2
ECHO LV INTERNAL DIMENSION SYSTOLIC: 2.3 CM
ECHO LV IVSD: 1.3 CM (ref 0.6–0.9)
ECHO LV MASS 2D: 138.8 G (ref 67–162)
ECHO LV MASS INDEX 2D: 67.7 G/M2 (ref 43–95)
ECHO LV POSTERIOR WALL DIASTOLIC: 1.2 CM (ref 0.6–0.9)
ECHO LV RELATIVE WALL THICKNESS RATIO: 0.71
ECHO LVOT AV VTI INDEX: 0.56
ECHO LVOT MEAN GRADIENT: 1 MMHG
ECHO LVOT PEAK GRADIENT: 2 MMHG
ECHO LVOT PEAK VELOCITY: 0.7 M/S
ECHO LVOT VTI: 17.1 CM
ECHO MV A VELOCITY: 0.88 M/S
ECHO MV AREA PHT: 3.1 CM2
ECHO MV E DECELERATION TIME (DT): 241.6 MS
ECHO MV E VELOCITY: 0.79 M/S
ECHO MV E/A RATIO: 0.9
ECHO MV E/E' LATERAL: 9.88
ECHO MV E/E' RATIO (AVERAGED): 11.52
ECHO MV E/E' SEPTAL: 13.17
ECHO MV PRESSURE HALF TIME (PHT): 70.1 MS
ECHO RIGHT VENTRICULAR SYSTOLIC PRESSURE (RVSP): 38 MMHG
ECHO RV FREE WALL PEAK S': 14 CM/S
ECHO RV INTERNAL DIMENSION: 3.9 CM
ECHO RV TAPSE: 2.4 CM (ref 1.7–?)
ECHO TV REGURGITANT MAX VELOCITY: 2.97 M/S
ECHO TV REGURGITANT PEAK GRADIENT: 35 MMHG

## 2023-05-16 PROCEDURE — 1036F TOBACCO NON-USER: CPT | Performed by: INTERNAL MEDICINE

## 2023-05-16 PROCEDURE — 1123F ACP DISCUSS/DSCN MKR DOCD: CPT | Performed by: INTERNAL MEDICINE

## 2023-05-16 PROCEDURE — 3074F SYST BP LT 130 MM HG: CPT | Performed by: INTERNAL MEDICINE

## 2023-05-16 PROCEDURE — 1090F PRES/ABSN URINE INCON ASSESS: CPT | Performed by: INTERNAL MEDICINE

## 2023-05-16 PROCEDURE — G8417 CALC BMI ABV UP PARAM F/U: HCPCS | Performed by: INTERNAL MEDICINE

## 2023-05-16 PROCEDURE — 93306 TTE W/DOPPLER COMPLETE: CPT

## 2023-05-16 PROCEDURE — 3079F DIAST BP 80-89 MM HG: CPT | Performed by: INTERNAL MEDICINE

## 2023-05-16 PROCEDURE — G8400 PT W/DXA NO RESULTS DOC: HCPCS | Performed by: INTERNAL MEDICINE

## 2023-05-16 PROCEDURE — 99214 OFFICE O/P EST MOD 30 MIN: CPT | Performed by: INTERNAL MEDICINE

## 2023-05-16 PROCEDURE — 93306 TTE W/DOPPLER COMPLETE: CPT | Performed by: INTERNAL MEDICINE

## 2023-05-16 PROCEDURE — G8427 DOCREV CUR MEDS BY ELIG CLIN: HCPCS | Performed by: INTERNAL MEDICINE

## 2023-05-16 ASSESSMENT — ENCOUNTER SYMPTOMS
CHEST TIGHTNESS: 0
WHEEZING: 0
SHORTNESS OF BREATH: 0

## 2023-05-16 NOTE — PROGRESS NOTES
Chief Complaint   Patient presents with    Cholesterol Problem    Hypertension    Chest Pain    Shortness of Breath    Follow-up     Echo with Cynthia Dalton this morning      Vitals:    05/16/23 1032   BP: 122/80   Pulse: 67   SpO2: 99%   Weight: 218 lb (98.9 kg)   Height: 5' 5\" (1.651 m)        Chest pain-occasional   SOB yes  Palpitations 1 or 2 xs  Swelling in hands/feet -feet  Dizziness occasional  Recent hospital stays denied   Refills denied

## 2023-05-16 NOTE — PROGRESS NOTES
Jazmin Castillo MD, MS, [facility]            HISTORY OF PRESENT ILLNESS:    Bear Mir is a [de-identified] y.o. female presents today for had concerns including Cholesterol Problem, Hypertension, Chest Pain, Shortness of Breath, and Follow-up (Echo with Michelle Mack this morning ). Seen at my prior practice - admitted [facility] 2019 - NSTEMI - echo normal LV fxn, mild LVH. Cath no obstructive CAD. Placed on medical therapy. In the interim was admitted to [facility]3] with AFIB with RVR, Dr. Maco Walters performed afib ablation - perhaps . All went well. Things have been calm, occasional pings/shocks on left side of chest, with SOB. Sits down. Wore monitor with VAC, reportedly no AFIB. TKR 2023 - still recooperating. Otherwise feels well. SUMMARY:   Patient Active Problem List   Diagnosis    Vitamin D deficiency    Type 2 diabetes mellitus with stage 3a chronic kidney disease, without long-term current use of insulin ([facility] [address]    Dependent edema    History of angina     (normal spontaneous vaginal delivery)    S/P D&C (status post dilation and curettage)    Benign essential hypertension    Venous insufficiency of both lower extremities    Venous stasis dermatitis of both lower extremities    S/P laparoscopic cholecystectomy    Ankle edema    History of supraventricular tachycardia    ACP (advance care planning)    Dyspepsia    History of non-ST elevation myocardial infarction (NSTEMI)    Hypercholesterolemia    S/P bunionectomy    Severe obesity (BMI 35.0-39. 9) with comorbidity (HCC)    Atrial fibrillation (HCC)    S/P ablation of atrial fibrillation       Current Outpatient Medications   Medication Sig Dispense Refill    Omega-3 Fatty Acids (FISH OIL PO) Take by mouth      TURMERIC PO Take 1,000 mg by mouth daily      acetaminophen (TYLENOL) 500 MG tablet Take 650 mg by mouth every 4 hours as needed      aspirin 81 MG chewable tablet Take 1 tablet by mouth daily      vitamin D (CHOLECALCIFEROL) 25 MCG (1000

## 2023-05-16 NOTE — PROGRESS NOTES
No chief complaint on file.     [unfilled]   5' 5\" (1.651 m)   BMI 37.24 kg/m²      Chest pain             no  SOB                       no  Swelling                 no  Dizziness               no  Recent hospital     no  Refills                    no         Covid vaccination  no  Covid                     yes

## 2023-06-14 NOTE — HOME HEALTH
Subjective: \"I haven't taken anything for pain yet today but my knee is feeling stiff this morning. \"  Falls since last visit NO(if yes complete the Fall Tracking Form and include bsrifallreport):   Caregiver involvement changes: none  Home health supplies by type and quantity ordered/delivered this visit include: none    Clinician asked if patient has had any physician contact since last home care visit and patient states: NO  Clinician asked if patient has any new or changed medications and patient states:  NO   If Yes, were medications reconciled? N/A   Was the certifying physician notified of changes in medications? N/A     Clinical assessment (what this visit means for the patient overall and need for ongoing skilled care) and progress or lack of progress towards SPECIFIC goals: Pt. educated throughout 4 visits on med regimen, pain management, incision care, infection prevention, HTN and fall prevention. Incision d/i, no s/sx infection noted, picture taken at today's visit and uploaded to [address] [place] [address]. Pt. has met educational goals. No further visits are needed from . Written Teaching Material Utilized: discharge instructions    Interdisciplinary communication with: MD to notify of discharge     Discharge planning as follows:  When goals are met    Specific plan for next visit: N/A Tazorac Pregnancy And Lactation Text: This medication is not safe during pregnancy. It is unknown if this medication is excreted in breast milk.

## 2023-08-08 ENCOUNTER — TELEPHONE (OUTPATIENT)
Age: 78
End: 2023-08-08

## 2023-08-08 NOTE — TELEPHONE ENCOUNTER
Patient called stating that she was in the hospital from 8/3 - 8/7 due to chest pain, shortness of breath, and a blood clot was found. Patient was admitted to [facility]. She is hoping to get a hospital follow sooner rather than later, and wanted to see if she could be squeezed in anywhere. The only day she is not available is 8/17.     Best call back number  [phone]

## 2023-08-10 ENCOUNTER — TELEPHONE (OUTPATIENT)
Age: 78
End: 2023-08-10

## 2023-08-10 NOTE — TELEPHONE ENCOUNTER
Patient called stating that she is in need of a Hospital follow up as soon as possible. Patient is hoping to get a call back when we have found something for her. She states that this is a heart issue, and really needs to get in. Patient is also hoping for Dr. Jim Pichardo to give her a call back.     Best call back number  [phone]

## 2023-08-10 NOTE — TELEPHONE ENCOUNTER
Spoke to pt. She was d/c'd from [facility] on 8/7 and was told to make a f/u appt with PCP. I was able to get [facility] from [facility] AND [facility]. She was also instructed to f/u with pulmonary. Pt Dx with unprovoked SC. Pt d/c home with home health. I scheduled pt for 8/24 @ 11:20. I also gave her the # for [facility] to schedule an appt with them.

## 2023-08-24 ENCOUNTER — OFFICE VISIT (OUTPATIENT)
Age: 78
End: 2023-08-24
Payer: MEDICARE

## 2023-08-24 VITALS
SYSTOLIC BLOOD PRESSURE: 128 MMHG | WEIGHT: 224.6 LBS | RESPIRATION RATE: 16 BRPM | DIASTOLIC BLOOD PRESSURE: 74 MMHG | HEIGHT: 65 IN | BODY MASS INDEX: 37.42 KG/M2 | HEART RATE: 75 BPM | OXYGEN SATURATION: 98 % | TEMPERATURE: 98 F

## 2023-08-24 DIAGNOSIS — I25.2 HISTORY OF NON-ST ELEVATION MYOCARDIAL INFARCTION (NSTEMI): ICD-10-CM

## 2023-08-24 DIAGNOSIS — I87.2 VENOUS INSUFFICIENCY OF BOTH LOWER EXTREMITIES: ICD-10-CM

## 2023-08-24 DIAGNOSIS — Z09 HOSPITAL DISCHARGE FOLLOW-UP: Primary | ICD-10-CM

## 2023-08-24 DIAGNOSIS — E78.2 MIXED HYPERLIPIDEMIA: ICD-10-CM

## 2023-08-24 DIAGNOSIS — N18.31 TYPE 2 DIABETES MELLITUS WITH STAGE 3A CHRONIC KIDNEY DISEASE, WITHOUT LONG-TERM CURRENT USE OF INSULIN (HCC): ICD-10-CM

## 2023-08-24 DIAGNOSIS — E11.22 TYPE 2 DIABETES MELLITUS WITH STAGE 3A CHRONIC KIDNEY DISEASE, WITHOUT LONG-TERM CURRENT USE OF INSULIN (HCC): ICD-10-CM

## 2023-08-24 DIAGNOSIS — E66.01 SEVERE OBESITY (BMI 35.0-39.9) WITH COMORBIDITY (HCC): ICD-10-CM

## 2023-08-24 DIAGNOSIS — I10 BENIGN ESSENTIAL HYPERTENSION: ICD-10-CM

## 2023-08-24 DIAGNOSIS — Z86.79 HISTORY OF ATRIAL FIBRILLATION: ICD-10-CM

## 2023-08-24 DIAGNOSIS — I26.99 ACUTE PULMONARY EMBOLISM WITHOUT ACUTE COR PULMONALE, UNSPECIFIED PULMONARY EMBOLISM TYPE (HCC): ICD-10-CM

## 2023-08-24 LAB
CREATININE SER, EXTERNAL: 1.38
HDL, EXTERNAL: 48
HEMOGLOBIN, EXTERNAL: 12.6
LDL-C, EXTERNAL: 89
POTASSIUM, EXTERNAL: 4.7
TOTAL CHOLESTEROL, EXTERNAL: 157
TRIGLYCERIDES, EXTERNAL: 102

## 2023-08-24 PROCEDURE — 99215 OFFICE O/P EST HI 40 MIN: CPT | Performed by: FAMILY MEDICINE

## 2023-08-24 PROCEDURE — 1090F PRES/ABSN URINE INCON ASSESS: CPT | Performed by: FAMILY MEDICINE

## 2023-08-24 PROCEDURE — 3078F DIAST BP <80 MM HG: CPT | Performed by: FAMILY MEDICINE

## 2023-08-24 PROCEDURE — 1036F TOBACCO NON-USER: CPT | Performed by: FAMILY MEDICINE

## 2023-08-24 PROCEDURE — G8400 PT W/DXA NO RESULTS DOC: HCPCS | Performed by: FAMILY MEDICINE

## 2023-08-24 PROCEDURE — G8417 CALC BMI ABV UP PARAM F/U: HCPCS | Performed by: FAMILY MEDICINE

## 2023-08-24 PROCEDURE — G8427 DOCREV CUR MEDS BY ELIG CLIN: HCPCS | Performed by: FAMILY MEDICINE

## 2023-08-24 PROCEDURE — 1123F ACP DISCUSS/DSCN MKR DOCD: CPT | Performed by: FAMILY MEDICINE

## 2023-08-24 PROCEDURE — 3074F SYST BP LT 130 MM HG: CPT | Performed by: FAMILY MEDICINE

## 2023-08-24 RX ORDER — OMEGA-3-ACID ETHYL ESTERS 1 G/1
1 CAPSULE, LIQUID FILLED ORAL DAILY
COMMUNITY

## 2023-08-24 RX ORDER — ATORVASTATIN CALCIUM 40 MG/1
40 TABLET, FILM COATED ORAL DAILY
COMMUNITY

## 2023-08-24 RX ORDER — RANOLAZINE 500 MG/1
500 TABLET, EXTENDED RELEASE ORAL 2 TIMES DAILY
COMMUNITY

## 2023-08-24 SDOH — ECONOMIC STABILITY: FOOD INSECURITY: WITHIN THE PAST 12 MONTHS, YOU WORRIED THAT YOUR FOOD WOULD RUN OUT BEFORE YOU GOT MONEY TO BUY MORE.: NEVER TRUE

## 2023-08-24 SDOH — ECONOMIC STABILITY: HOUSING INSECURITY
IN THE LAST 12 MONTHS, WAS THERE A TIME WHEN YOU DID NOT HAVE A STEADY PLACE TO SLEEP OR SLEPT IN A SHELTER (INCLUDING NOW)?: NO

## 2023-08-24 SDOH — ECONOMIC STABILITY: INCOME INSECURITY: HOW HARD IS IT FOR YOU TO PAY FOR THE VERY BASICS LIKE FOOD, HOUSING, MEDICAL CARE, AND HEATING?: NOT HARD AT ALL

## 2023-08-24 SDOH — ECONOMIC STABILITY: FOOD INSECURITY: WITHIN THE PAST 12 MONTHS, THE FOOD YOU BOUGHT JUST DIDN'T LAST AND YOU DIDN'T HAVE MONEY TO GET MORE.: NEVER TRUE

## 2023-08-24 ASSESSMENT — PATIENT HEALTH QUESTIONNAIRE - PHQ9
SUM OF ALL RESPONSES TO PHQ QUESTIONS 1-9: 0
SUM OF ALL RESPONSES TO PHQ9 QUESTIONS 1 & 2: 0
SUM OF ALL RESPONSES TO PHQ QUESTIONS 1-9: 0
SUM OF ALL RESPONSES TO PHQ QUESTIONS 1-9: 0
2. FEELING DOWN, DEPRESSED OR HOPELESS: 0
SUM OF ALL RESPONSES TO PHQ QUESTIONS 1-9: 0
1. LITTLE INTEREST OR PLEASURE IN DOING THINGS: 0

## 2023-08-24 ASSESSMENT — ENCOUNTER SYMPTOMS
NAUSEA: 0
DIARRHEA: 0
ANAL BLEEDING: 0
EYES NEGATIVE: 1
GASTROINTESTINAL NEGATIVE: 1
VOMITING: 0
CONSTIPATION: 0
ABDOMINAL PAIN: 0
WHEEZING: 0
COUGH: 0
BLOOD IN STOOL: 0

## 2023-08-24 NOTE — PROGRESS NOTES
Chief Complaint   Patient presents with    Follow-Up from [address] [facility] 8/3-8/7 Pulmonary Embolism     HISTORY OF PRESENT ILLNESS   HPI  Patient presents for hospital follow up from [address] [place] [address] 8/3/23-8/7/23 diagnosed w/ Pulmonary Embolism. She presented to the ED 8/3 via EMS after calling 911 for c/o exertional chest pain and worsening SOB. She had started a few weeks prior w/ having some HERNANDEZ and increased BLE edema. But the day of hospitalization hours prior to admission she experienced chest heaviness, SOB, diaphoresis, nausea, and lightheadedness walking up her stairs. EMS gave her an asa upon their arrival and transported her directly to [address] [place] [address] ER. On arrival her BP was 128/81, pulse 97 and regular, resp 14, Spo2 92% room air. EKG NSR and non acute. CXR negative. BNP negative. Troponin and d-dimer elevated. Glucose 167. Hgb 167. Cr 1.38 (in her baseline). Lytes normal. LDL 89Given SL NTG, felt worse. Given Morphine w/ relief. Admitted and cardiology consulted. Started on Heparin drip then changed to Lovenox. Underwent left heart cath on 8/3 which revealed mild LAD bridging and no obstructive CAD. Pulmonary was consulted. CT not performed due to CKD. VQ Scan done 8/5 was positive and c/w high probability for PE. BLE doppler obtained & Echo reportedly ordered (I dont see report of these but pt states was told all ok/negative). Changed to Eliquis on 8/5. Seen by OT/PT, stable. Discharged home on 8/7 in stable condition, declined home health. Vitals at DC stable, /72, pulse 81, resp 17, Spo2 97% room air. Afebrile. At discharge she was continued on her usual home meds but states her Aldactone was increased to bid. New medications at discharge were: Lipitor 40 mg, Lovaza 1 g once daily, Eliquis starter pack (now at 5 mg bid), Ranexa 500 mg bid (anti-anginal). She was advised to follow up w/ pulmonary as well as her cardiologist. Both of those appts are scheduled as detailed in A/P.  Since being home she reports

## 2023-08-24 NOTE — ASSESSMENT & PLAN NOTE
[facility] 3/3/21-3/6/21 S/p cardioversion w/ IV Adenosine. Stable on increased regimen of Toprol.  EP Cardiology follow up 3/30/21 at [facility] w/ Dr. Hossein Jiang EP; Scheduled for follow up w/ Dr. Hossein Jiang 9-5-23

## 2023-08-24 NOTE — ASSESSMENT & PLAN NOTE
Followed by cardiology. Mild nonobstructive CAD proximal LAD on left heart cath  CJW, Echo mild LVH, EF 60-65%; Cardio Dr. Edd Banerjee; [address] 8/3/23 for chest pain: mild LAD bridging, no obstructive coronary artery disease.  Follow up appt scheduled w/ Dr. Edd Banerjee 11-20-23

## 2023-08-24 NOTE — ASSESSMENT & PLAN NOTE
Diagnosed Type 2 NIDDM 5/2012, HgbA1c 6.5; CKD 2012; Dr Judy whitaker; Renal US 2/2013; follow up nephrology prn only

## 2023-08-24 NOTE — PROGRESS NOTES
Chief Complaint   Patient presents with    Follow-Up from Hospital     1. \"Have you been to the ER, urgent care clinic since your last visit? Hospitalized since your last visit? \" Yes [address] Maggie Ardenal Hiteshloraine @ [facility]    2. \"Have you seen or consulted any other health care providers outside of the [address] [place] [address] since your last visit? \" No     3. For patients aged 43-73: Has the patient had a colonoscopy / FIT/ Cologuard? Yes - no Care Gap present 6/2017 Dr Gerardo Joe - follow up in 10 years      If the patient is female:    3. For patients aged 43-66: Has the patient had a mammogram within the past 2 years? Yes - no Care Gap present 1/2023      5. For patients aged 21-65: Has the patient had a pap smear?  NA - based on age or sex

## 2023-09-05 ENCOUNTER — CLINICAL DOCUMENTATION (OUTPATIENT)
Age: 78
End: 2023-09-05

## 2023-09-06 RX ORDER — METOPROLOL SUCCINATE 25 MG/1
TABLET, EXTENDED RELEASE ORAL
Qty: 90 TABLET | Refills: 1 | Status: SHIPPED | OUTPATIENT
Start: 2023-09-06

## 2023-09-06 NOTE — TELEPHONE ENCOUNTER
PCP: Alissa Pineda MD    Last appt: 8/24/2023       Future Appointments   Date Time Provider [address]   11/14/2023  9:20 AM Alissa Pineda MD PAFP BS AMB   11/20/2023 11:00 AM Dylan Michael MD CAV[facility] BS AMB   1/10/2024 12:15 PM [facility]       Requested Prescriptions     Pending Prescriptions Disp Refills    metoprolol succinate (TOPROL XL) 25 MG extended release tablet [Pharmacy Med Name: METOPROLOL SUCCINATE ER 25 MG Tablet Extended Release 24 Hour] 90 tablet      Sig: TAKE 1 TABLET EVERY DAY FOR HYPERTENSION       Prior labs and Blood pressures:  BP Readings from Last 3 Encounters:   08/24/23 128/74   05/16/23 122/80   05/16/23 122/80     Lab Results   Component Value Date/Time     11/14/2022 10:58 AM    K 4.1 11/14/2022 10:58 AM     11/14/2022 10:58 AM    CO2 31 11/14/2022 10:58 AM    BUN 19 11/14/2022 10:58 AM    GFRAA 59 11/11/2021 10:52 AM     Lab Results   Component Value Date/Time    KNF9MIAO 6.0 06/23/2022 10:43 AM     Lab Results   Component Value Date/Time    CHOL 180 11/14/2022 10:58 AM    HDL 62 11/14/2022 10:58 AM    VLDL 21 09/14/2020 10:46 AM     No results found for: VITD3, VD3RIA    Lab Results   Component Value Date/Time    TSH 2.48 11/14/2022 10:58 AM

## 2023-11-14 ENCOUNTER — OFFICE VISIT (OUTPATIENT)
Age: 78
End: 2023-11-14
Payer: MEDICARE

## 2023-11-14 VITALS
HEIGHT: 65 IN | BODY MASS INDEX: 38.59 KG/M2 | HEART RATE: 72 BPM | OXYGEN SATURATION: 96 % | WEIGHT: 231.6 LBS | RESPIRATION RATE: 16 BRPM | TEMPERATURE: 97.8 F | DIASTOLIC BLOOD PRESSURE: 82 MMHG | SYSTOLIC BLOOD PRESSURE: 136 MMHG

## 2023-11-14 DIAGNOSIS — I87.2 VENOUS INSUFFICIENCY OF BOTH LOWER EXTREMITIES: ICD-10-CM

## 2023-11-14 DIAGNOSIS — Z98.890 S/P ABLATION OF ATRIAL FIBRILLATION: ICD-10-CM

## 2023-11-14 DIAGNOSIS — E78.2 MIXED HYPERLIPIDEMIA: ICD-10-CM

## 2023-11-14 DIAGNOSIS — I26.99 ACUTE PULMONARY EMBOLISM WITHOUT ACUTE COR PULMONALE, UNSPECIFIED PULMONARY EMBOLISM TYPE (HCC): ICD-10-CM

## 2023-11-14 DIAGNOSIS — I10 BENIGN ESSENTIAL HYPERTENSION: ICD-10-CM

## 2023-11-14 DIAGNOSIS — N18.31 TYPE 2 DIABETES MELLITUS WITH STAGE 3A CHRONIC KIDNEY DISEASE, WITHOUT LONG-TERM CURRENT USE OF INSULIN (HCC): Primary | ICD-10-CM

## 2023-11-14 DIAGNOSIS — E11.22 TYPE 2 DIABETES MELLITUS WITH STAGE 3A CHRONIC KIDNEY DISEASE, WITHOUT LONG-TERM CURRENT USE OF INSULIN (HCC): Primary | ICD-10-CM

## 2023-11-14 DIAGNOSIS — Z86.79 S/P ABLATION OF ATRIAL FIBRILLATION: ICD-10-CM

## 2023-11-14 LAB
ALBUMIN SERPL-MCNC: 4 G/DL (ref 3.5–5)
ALBUMIN/GLOB SERPL: 1.1 (ref 1.1–2.2)
ALP SERPL-CCNC: 162 U/L (ref 45–117)
ALT SERPL-CCNC: 15 U/L (ref 12–78)
ANION GAP SERPL CALC-SCNC: 2 MMOL/L (ref 5–15)
AST SERPL-CCNC: 16 U/L (ref 15–37)
BILIRUB SERPL-MCNC: 0.7 MG/DL (ref 0.2–1)
BUN SERPL-MCNC: 12 MG/DL (ref 6–20)
BUN/CREAT SERPL: 11 (ref 12–20)
CALCIUM SERPL-MCNC: 9.3 MG/DL (ref 8.5–10.1)
CHLORIDE SERPL-SCNC: 105 MMOL/L (ref 97–108)
CHOLEST SERPL-MCNC: 105 MG/DL
CO2 SERPL-SCNC: 31 MMOL/L (ref 21–32)
CREAT SERPL-MCNC: 1.09 MG/DL (ref 0.55–1.02)
EST. AVERAGE GLUCOSE BLD GHB EST-MCNC: 126 MG/DL
GLOBULIN SER CALC-MCNC: 3.5 G/DL (ref 2–4)
GLUCOSE SERPL-MCNC: 110 MG/DL (ref 65–100)
HBA1C MFR BLD: 6 % (ref 4–5.6)
HDLC SERPL-MCNC: 63 MG/DL
HDLC SERPL: 1.7 (ref 0–5)
LDLC SERPL CALC-MCNC: 32.2 MG/DL (ref 0–100)
POTASSIUM SERPL-SCNC: 4.3 MMOL/L (ref 3.5–5.1)
PROT SERPL-MCNC: 7.5 G/DL (ref 6.4–8.2)
SODIUM SERPL-SCNC: 138 MMOL/L (ref 136–145)
TRIGL SERPL-MCNC: 49 MG/DL
TSH SERPL DL<=0.05 MIU/L-ACNC: 1.14 UIU/ML (ref 0.36–3.74)
VLDLC SERPL CALC-MCNC: 9.8 MG/DL

## 2023-11-14 PROCEDURE — 3075F SYST BP GE 130 - 139MM HG: CPT | Performed by: FAMILY MEDICINE

## 2023-11-14 PROCEDURE — G8427 DOCREV CUR MEDS BY ELIG CLIN: HCPCS | Performed by: FAMILY MEDICINE

## 2023-11-14 PROCEDURE — G8400 PT W/DXA NO RESULTS DOC: HCPCS | Performed by: FAMILY MEDICINE

## 2023-11-14 PROCEDURE — 99214 OFFICE O/P EST MOD 30 MIN: CPT | Performed by: FAMILY MEDICINE

## 2023-11-14 PROCEDURE — G8417 CALC BMI ABV UP PARAM F/U: HCPCS | Performed by: FAMILY MEDICINE

## 2023-11-14 PROCEDURE — 3079F DIAST BP 80-89 MM HG: CPT | Performed by: FAMILY MEDICINE

## 2023-11-14 PROCEDURE — 1090F PRES/ABSN URINE INCON ASSESS: CPT | Performed by: FAMILY MEDICINE

## 2023-11-14 PROCEDURE — 1036F TOBACCO NON-USER: CPT | Performed by: FAMILY MEDICINE

## 2023-11-14 PROCEDURE — G8484 FLU IMMUNIZE NO ADMIN: HCPCS | Performed by: FAMILY MEDICINE

## 2023-11-14 PROCEDURE — 1123F ACP DISCUSS/DSCN MKR DOCD: CPT | Performed by: FAMILY MEDICINE

## 2023-11-14 PROCEDURE — 3044F HG A1C LEVEL LT 7.0%: CPT | Performed by: FAMILY MEDICINE

## 2023-11-14 ASSESSMENT — PATIENT HEALTH QUESTIONNAIRE - PHQ9
SUM OF ALL RESPONSES TO PHQ9 QUESTIONS 1 & 2: 0
1. LITTLE INTEREST OR PLEASURE IN DOING THINGS: 0
2. FEELING DOWN, DEPRESSED OR HOPELESS: 0
SUM OF ALL RESPONSES TO PHQ QUESTIONS 1-9: 0

## 2023-11-14 ASSESSMENT — ENCOUNTER SYMPTOMS
DIARRHEA: 0
GASTROINTESTINAL NEGATIVE: 1
EYES NEGATIVE: 1
NAUSEA: 0
ABDOMINAL PAIN: 0
COUGH: 0
SHORTNESS OF BREATH: 0
WHEEZING: 0
RESPIRATORY NEGATIVE: 1
CONSTIPATION: 0
VOMITING: 0

## 2023-11-15 NOTE — PROGRESS NOTES
Chief Complaint   Patient presents with    Diabetes     Fasting routine follow up    Cholesterol Problem    Hypertension     1. \"Have you been to the ER, urgent care clinic since your last visit? Hospitalized since your last visit? \" No    2. \"Have you seen or consulted any other health care providers outside of the [address] [place] [address] Avenue since your last visit? \" Cardiologist Dr Marylene Gammon In Sept, Pul Dr Coleman Grissom    3. For patients aged 43-73: Has the patient had a colonoscopy / FIT/ Cologuard? Yes - no Care Gap present 6/2017 Dr Agus Santana - follow up in 10 years      If the patient is female:    3. For patients aged 43-66: Has the patient had a mammogram within the past 2 years? Yes - no Care Gap present 1/2023      5. For patients aged 21-65: Has the patient had a pap smear?  NA
follow up 3/30/21 at [facility] w/ Dr. Maria Cleary EP; EKG this hospitalization NSR normal rate. Continue Bblocker therapy and asa. New to Eliquis for recent PE. Followed by Dr. Nguyễn Conde, last visit 9-5-23  NSTEMI: Followed by cardiology. Mild nonobstructive CAD proximal LAD on left heart cath  CJW, Echo mild LVH, EF 60-65%; Cardio Dr. Jostin Ricardo; [address] 8/3/23 for chest pain: mild LAD bridging, no obstructive coronary artery disease. Currently on asa, Eliquis, Bblocker, Imdur and started on Lipitor, off of Ranexa. Follow up appt scheduled w/ Dr. Jostin Ricardo 11-20-23  5. Acute pulmonary embolism without acute cor pulmonale, unspecified pulmonary embolism type (HCC)  Question of unprovoked vs subacute s/p knee replacement surgery a few months prior. Will need at least 3-6 months of Eliquis and followed by pulmonary. Had f/u appt w/ Pulmonary Dr. Chrystal Vieyra on 8/31/23, continued on Eliquis 5 mg bid and scheduled for next follow up w/ him later this month  6. Venous insufficiency of both lower extremities  CALEB, low sodium diet  Recommend trying support stockings again  Elevate prn  Regular exercise and avoid being sedentary  Work on weight reduction  Continue Aldactone 50 mg bid and Lasix po prn as directed per cardiology  7. BMI 38.0-38.9,adult  -     Comprehensive Metabolic Panel; Future  -     Hemoglobin A1C; Future  -     Lipid Panel; Future  -     TSH; Future    Reviewed diet, nutrition, exercise, weight management/goals, risk reduction, cardiovascular goals for age and associated health risks, medications, effects, risks, benefits, potential interactions and possible side effects. Age/risk based screening recommendations, health maintenance & prevention counseling. Cancer screening USPTFS guidelines reviewed w/ pt today. Discussed benefits/positive/negative outcomes of screening based on age/risk stratification. Informed consent for/against screening based on pt's personal hx/risk factors.    Updated

## 2023-11-20 ENCOUNTER — OFFICE VISIT (OUTPATIENT)
Age: 78
End: 2023-11-20
Payer: MEDICARE

## 2023-11-20 VITALS
BODY MASS INDEX: 39.99 KG/M2 | OXYGEN SATURATION: 97 % | DIASTOLIC BLOOD PRESSURE: 88 MMHG | WEIGHT: 240 LBS | SYSTOLIC BLOOD PRESSURE: 130 MMHG | HEIGHT: 65 IN | HEART RATE: 69 BPM

## 2023-11-20 DIAGNOSIS — R60.9 EDEMA, UNSPECIFIED TYPE: ICD-10-CM

## 2023-11-20 DIAGNOSIS — I48.91 ATRIAL FIBRILLATION, UNSPECIFIED TYPE (HCC): Primary | ICD-10-CM

## 2023-11-20 DIAGNOSIS — I87.2 VENOUS INSUFFICIENCY OF BOTH LOWER EXTREMITIES: ICD-10-CM

## 2023-11-20 DIAGNOSIS — I26.99 ACUTE PULMONARY EMBOLISM WITHOUT ACUTE COR PULMONALE, UNSPECIFIED PULMONARY EMBOLISM TYPE (HCC): ICD-10-CM

## 2023-11-20 DIAGNOSIS — E78.2 MIXED HYPERLIPIDEMIA: ICD-10-CM

## 2023-11-20 DIAGNOSIS — I47.19 AVNRT (AV NODAL RE-ENTRY TACHYCARDIA): ICD-10-CM

## 2023-11-20 DIAGNOSIS — I10 HTN (HYPERTENSION), BENIGN: ICD-10-CM

## 2023-11-20 PROBLEM — Z86.79 HISTORY OF ATRIAL FIBRILLATION: Status: RESOLVED | Noted: 2023-08-24 | Resolved: 2023-11-20

## 2023-11-20 PROBLEM — Z98.890 S/P ABLATION OF ATRIAL FIBRILLATION: Status: RESOLVED | Noted: 2023-05-16 | Resolved: 2023-11-20

## 2023-11-20 PROBLEM — Z86.79 S/P ABLATION OF ATRIAL FIBRILLATION: Status: RESOLVED | Noted: 2023-05-16 | Resolved: 2023-11-20

## 2023-11-20 PROCEDURE — 1036F TOBACCO NON-USER: CPT | Performed by: INTERNAL MEDICINE

## 2023-11-20 PROCEDURE — 3075F SYST BP GE 130 - 139MM HG: CPT | Performed by: INTERNAL MEDICINE

## 2023-11-20 PROCEDURE — 93005 ELECTROCARDIOGRAM TRACING: CPT | Performed by: INTERNAL MEDICINE

## 2023-11-20 PROCEDURE — G8484 FLU IMMUNIZE NO ADMIN: HCPCS | Performed by: INTERNAL MEDICINE

## 2023-11-20 PROCEDURE — 93010 ELECTROCARDIOGRAM REPORT: CPT | Performed by: INTERNAL MEDICINE

## 2023-11-20 PROCEDURE — 1123F ACP DISCUSS/DSCN MKR DOCD: CPT | Performed by: INTERNAL MEDICINE

## 2023-11-20 PROCEDURE — 99214 OFFICE O/P EST MOD 30 MIN: CPT | Performed by: INTERNAL MEDICINE

## 2023-11-20 PROCEDURE — 3079F DIAST BP 80-89 MM HG: CPT | Performed by: INTERNAL MEDICINE

## 2023-11-20 PROCEDURE — 1090F PRES/ABSN URINE INCON ASSESS: CPT | Performed by: INTERNAL MEDICINE

## 2023-11-20 PROCEDURE — G8417 CALC BMI ABV UP PARAM F/U: HCPCS | Performed by: INTERNAL MEDICINE

## 2023-11-20 PROCEDURE — G8427 DOCREV CUR MEDS BY ELIG CLIN: HCPCS | Performed by: INTERNAL MEDICINE

## 2023-11-20 PROCEDURE — G8400 PT W/DXA NO RESULTS DOC: HCPCS | Performed by: INTERNAL MEDICINE

## 2023-11-20 RX ORDER — NEBULIZER AND COMPRESSOR
1 EACH MISCELLANEOUS DAILY
Qty: 1 KIT | Refills: 0 | Status: SHIPPED | OUTPATIENT
Start: 2023-11-20

## 2023-11-20 RX ORDER — FUROSEMIDE 20 MG/1
20 TABLET ORAL DAILY
Qty: 90 TABLET | Refills: 3 | Status: SHIPPED | OUTPATIENT
Start: 2023-11-20

## 2023-11-20 RX ORDER — MULTIVIT WITH MINERALS/LUTEIN
250 TABLET ORAL DAILY
COMMUNITY

## 2023-11-20 NOTE — PROGRESS NOTES
Chief Complaint   Patient presents with    Chest Pain    Hypertension    Atrial Fibrillation    Cholesterol Problem    Shortness of Breath    Kidney Problem     Vitals:    11/20/23 1106   BP: 130/88   Site: Left Upper Arm   Position: Sitting   Pulse: 69   SpO2: 97%   Weight: 108.9 kg (240 lb)   Height: 1.651 m (5' 5\")       Chest pain: DENIED     SOB: W/ ACTIVITY     Palpitations: DENIED     Swelling in hands/feet: BOTH FEET/LEGS     Dizziness: DENIED     Recent hospital stays: DENIED     Refills: DENIED

## 2023-11-20 NOTE — PROGRESS NOTES
Patient: Miracle Campbell  : 1945    Primary Cardiologist: Jensen Morton MD  EP Cardiologist: Dr. Annmarie Jean  PCP: Neli Mohr MD    Today's Date: 2023      ASSESSMENT AND PLAN:     Assessment and Plan:  CP/elevated troponin  Cath , 2023 Dr. Livia Jesus no obstructive CAD  Would advocate no more caths  Imdur + spironolactone  Stop ranexa  Likely related to PE this last time    2. AVNRT sp ablation Dr. Gladis Rivero 2021  Toprol     3. HLD  Atorva 40  LDL 32    4. PE 2023  Dr. Janay Marie -expensive      Follow up 6 weeks with echo. ICD-10-CM    1. Atrial fibrillation, unspecified type (HCC)  I48.91 EKG 12 Lead      2. Edema, unspecified type  R60.9 furosemide (LASIX) 20 MG tablet      3. Acute pulmonary embolism without acute cor pulmonale, unspecified pulmonary embolism type (HCC)  I26.99       4. Mixed hyperlipidemia  E78.2       5. Venous insufficiency of both lower extremities  I87.2       6. AVNRT (AV nhung re-entry tachycardia)  I47.19       7. HTN (hypertension), benign  I10           HISTORY OF PRESENT ILLNESS:     History of Present Illness:  Miracle Campbell is a 78 y.o. female who presents for FU. Seen at my prior practice - admitted [facility] 2019 - NSTEMI - echo normal LV fxn, mild LVH. Cath no obstructive CAD. Placed on medical therapy. In the interim was admitted to [facility] with AVNRT sp ablation Dr. Gladis Rivero 2021. All went well. TKR 2023 . Admission [facility] 2023 - elevated troponin - Dr. Livia Jesus repeated a cath - no CAD. Found to have PE via V/Q scan - states her kidney function would not allow CTPA? Creatinine 1.09. Placed on eliquis. Seeing Dr. Kasi Servin. Eliquis expensive. Today conveys LE edema dn weight gain. Will restart lasix. PAST MEDICAL HISTORY:     Past Medical History:   Diagnosis Date    ACP (advance care planning) 2017    Initial ACP discussion . AMD form reviewed and copy provided.

## 2023-12-06 ENCOUNTER — OFFICE VISIT (OUTPATIENT)
Age: 78
End: 2023-12-06
Payer: MEDICARE

## 2023-12-06 VITALS
RESPIRATION RATE: 16 BRPM | DIASTOLIC BLOOD PRESSURE: 72 MMHG | SYSTOLIC BLOOD PRESSURE: 128 MMHG | BODY MASS INDEX: 38.22 KG/M2 | TEMPERATURE: 97.7 F | HEIGHT: 65 IN | HEART RATE: 73 BPM | WEIGHT: 229.4 LBS | OXYGEN SATURATION: 99 %

## 2023-12-06 DIAGNOSIS — Z00.00 MEDICARE ANNUAL WELLNESS VISIT, SUBSEQUENT: Primary | ICD-10-CM

## 2023-12-06 PROCEDURE — 3074F SYST BP LT 130 MM HG: CPT | Performed by: FAMILY MEDICINE

## 2023-12-06 PROCEDURE — 1123F ACP DISCUSS/DSCN MKR DOCD: CPT | Performed by: FAMILY MEDICINE

## 2023-12-06 PROCEDURE — G0439 PPPS, SUBSEQ VISIT: HCPCS | Performed by: FAMILY MEDICINE

## 2023-12-06 PROCEDURE — G8484 FLU IMMUNIZE NO ADMIN: HCPCS | Performed by: FAMILY MEDICINE

## 2023-12-06 PROCEDURE — 3078F DIAST BP <80 MM HG: CPT | Performed by: FAMILY MEDICINE

## 2023-12-06 ASSESSMENT — LIFESTYLE VARIABLES
HOW MANY STANDARD DRINKS CONTAINING ALCOHOL DO YOU HAVE ON A TYPICAL DAY: 1 OR 2
HOW OFTEN DO YOU HAVE A DRINK CONTAINING ALCOHOL: MONTHLY OR LESS

## 2023-12-06 ASSESSMENT — PATIENT HEALTH QUESTIONNAIRE - PHQ9
SUM OF ALL RESPONSES TO PHQ QUESTIONS 1-9: 0
SUM OF ALL RESPONSES TO PHQ QUESTIONS 1-9: 0
1. LITTLE INTEREST OR PLEASURE IN DOING THINGS: 0
SUM OF ALL RESPONSES TO PHQ9 QUESTIONS 1 & 2: 0
SUM OF ALL RESPONSES TO PHQ QUESTIONS 1-9: 0
2. FEELING DOWN, DEPRESSED OR HOPELESS: 0
SUM OF ALL RESPONSES TO PHQ QUESTIONS 1-9: 0

## 2023-12-06 NOTE — PATIENT INSTRUCTIONS
disclaims any warranty or liability for your use of this information. Personalized Preventive Plan for Miracle Campbell - 12/6/2023  Medicare offers a range of preventive health benefits. Some of the tests and screenings are paid in full while other may be subject to a deductible, co-insurance, and/or copay. Some of these benefits include a comprehensive review of your medical history including lifestyle, illnesses that may run in your family, and various assessments and screenings as appropriate. After reviewing your medical record and screening and assessments performed today your provider may have ordered immunizations, labs, imaging, and/or referrals for you. A list of these orders (if applicable) as well as your Preventive Care list are included within your After Visit Summary for your review. Other Preventive Recommendations:    A preventive eye exam performed by an eye specialist is recommended every 1-2 years to screen for glaucoma; cataracts, macular degeneration, and other eye disorders. A preventive dental visit is recommended every 6 months. Try to get at least 150 minutes of exercise per week or 10,000 steps per day on a pedometer . Order or download the FREE \"Exercise & Physical Activity: Your Everyday Guide\" from [employer]. Call 5-510.485.5970 or search [employer] online. You need 9150-8508 mg of calcium and 8970-7173 IU of vitamin D per day. It is possible to meet your calcium requirement with diet alone, but a vitamin D supplement is usually necessary to meet this goal.  When exposed to the sun, use a sunscreen that protects against both UVA and UVB radiation with an SPF of 30 or greater. Reapply every 2 to 3 hours or after sweating, drying off with a towel, or swimming. Always wear a seat belt when traveling in a car. Always wear a helmet when riding a bicycle or motorcycle.

## 2023-12-06 NOTE — PROGRESS NOTES
Medicare Annual Wellness Visit    Venessa Le is here for Medicare AWV    Assessment & Plan   Medicare annual wellness visit, subsequent  Recommendations for Preventive Services Due: see orders and patient instructions/AVS.  Recommended screening schedule for the next 5-10 years is provided to the patient in written form: see Patient Instructions/AVS.  Reviewed diet, nutrition, exercise, weight management/goals, risk reduction, cardiovascular goals for age and associated health risks, medications, effects, risks, benefits, potential interactions and possible side effects. Age/risk based screening recommendations, health maintenance & prevention counseling. Cancer screening USPTFS guidelines reviewed w/ pt today. Discussed benefits/positive/negative outcomes of screening based on age/risk stratification. Informed consent for/against screening based on pt's personal hx/risk factors. Updated in history above and health maintenance. Refuses Dexa. Return in 6 months (on 6/6/2024). Patient's complete Health Risk Assessment and screening values have been reviewed and are found in Flowsheets. The following problems were reviewed today and where indicated follow up appointments were made and/or referrals ordered. Positive Risk Factor Screenings with Interventions:    Fall Risk:  Do you feel unsteady or are you worried about falling? : (!) yes  2 or more falls in past year?: no  Fall with injury in past year?: no     Interventions:  Fall prevention counseling w/ pt today. BMD screening/Dexa counseling. Patient declines.    See AVS for additional education material              Weight and Activity:  Physical Activity: Sufficiently Active (12/6/2023)    Exercise Vital Sign     Days of Exercise per Week: 6 days     Minutes of Exercise per Session: 40 min     On average, how many days per week do you engage in moderate to strenuous exercise (like a brisk walk)?: 6 days  Have you lost any weight without trying

## 2024-01-10 ENCOUNTER — HOSPITAL ENCOUNTER (OUTPATIENT)
Facility: HOSPITAL | Age: 79
Discharge: HOME | End: 2024-01-13
Payer: MEDICARE

## 2024-01-10 VITALS — HEIGHT: 65 IN | BODY MASS INDEX: 35.32 KG/M2 | WEIGHT: 212 LBS

## 2024-01-10 DIAGNOSIS — Z12.31 VISIT FOR SCREENING MAMMOGRAM: ICD-10-CM

## 2024-01-10 PROCEDURE — 77063 BREAST TOMOSYNTHESIS BI: CPT

## 2024-01-25 ENCOUNTER — TELEPHONE (OUTPATIENT)
Age: 79
End: 2024-01-25

## 2024-01-25 NOTE — TELEPHONE ENCOUNTER
Patient is having pain right lower side. She being taken away via ambulance and was told to call her PCP, but I let patient know it is after hours and that I will leave message for nurse to get back with her tomorrow 1/26.

## 2024-01-29 NOTE — TELEPHONE ENCOUNTER
Spoke to pt and she said that she went to [facility] (on [address]).  She didn't go to ER.  She started having right groin pain last Wed.  Certain movement can make it worse or better.  She is using a cane to walk.  She denies any falls or injury.  The pain radiates down her leg.  She said that [facility] told her, that all they could see on the xray was arthritis.  They gave her some tramadol, she said that it made her sick and she has thrown it away.  She also notes that her pain level is 9.5 in the right groin.    She is scheduled for an appt here on 1/31.  I have called [facility] to get notes and xray report. I will see if Dr Hopkins has any other recommendations and get back with her.

## 2024-01-31 ENCOUNTER — OFFICE VISIT (OUTPATIENT)
Age: 79
End: 2024-01-31
Payer: MEDICARE

## 2024-01-31 VITALS
SYSTOLIC BLOOD PRESSURE: 138 MMHG | TEMPERATURE: 97.5 F | HEIGHT: 65 IN | WEIGHT: 227.6 LBS | DIASTOLIC BLOOD PRESSURE: 80 MMHG | OXYGEN SATURATION: 97 % | RESPIRATION RATE: 16 BRPM | HEART RATE: 87 BPM | BODY MASS INDEX: 37.92 KG/M2

## 2024-01-31 DIAGNOSIS — I10 BENIGN ESSENTIAL HYPERTENSION: ICD-10-CM

## 2024-01-31 DIAGNOSIS — M54.31 RIGHT SIDED SCIATICA: ICD-10-CM

## 2024-01-31 DIAGNOSIS — E11.22 TYPE 2 DIABETES MELLITUS WITH STAGE 3A CHRONIC KIDNEY DISEASE, WITHOUT LONG-TERM CURRENT USE OF INSULIN (HCC): ICD-10-CM

## 2024-01-31 DIAGNOSIS — R10.31 SEVERE RIGHT GROIN PAIN: Primary | ICD-10-CM

## 2024-01-31 DIAGNOSIS — M79.604 RIGHT LEG PAIN: ICD-10-CM

## 2024-01-31 DIAGNOSIS — M25.551 RIGHT HIP PAIN: ICD-10-CM

## 2024-01-31 DIAGNOSIS — E78.2 MIXED HYPERLIPIDEMIA: ICD-10-CM

## 2024-01-31 DIAGNOSIS — N18.31 TYPE 2 DIABETES MELLITUS WITH STAGE 3A CHRONIC KIDNEY DISEASE, WITHOUT LONG-TERM CURRENT USE OF INSULIN (HCC): ICD-10-CM

## 2024-01-31 DIAGNOSIS — M16.11 PRIMARY OSTEOARTHRITIS OF RIGHT HIP: ICD-10-CM

## 2024-01-31 DIAGNOSIS — I87.2 VENOUS INSUFFICIENCY OF BOTH LOWER EXTREMITIES: ICD-10-CM

## 2024-01-31 PROBLEM — Z86.711 HISTORY OF PULMONARY EMBOLISM: Status: ACTIVE | Noted: 2023-08-24

## 2024-01-31 PROCEDURE — G8427 DOCREV CUR MEDS BY ELIG CLIN: HCPCS | Performed by: FAMILY MEDICINE

## 2024-01-31 PROCEDURE — 99215 OFFICE O/P EST HI 40 MIN: CPT | Performed by: FAMILY MEDICINE

## 2024-01-31 PROCEDURE — G8417 CALC BMI ABV UP PARAM F/U: HCPCS | Performed by: FAMILY MEDICINE

## 2024-01-31 PROCEDURE — 1036F TOBACCO NON-USER: CPT | Performed by: FAMILY MEDICINE

## 2024-01-31 PROCEDURE — 3079F DIAST BP 80-89 MM HG: CPT | Performed by: FAMILY MEDICINE

## 2024-01-31 PROCEDURE — 3075F SYST BP GE 130 - 139MM HG: CPT | Performed by: FAMILY MEDICINE

## 2024-01-31 PROCEDURE — 1090F PRES/ABSN URINE INCON ASSESS: CPT | Performed by: FAMILY MEDICINE

## 2024-01-31 PROCEDURE — G8400 PT W/DXA NO RESULTS DOC: HCPCS | Performed by: FAMILY MEDICINE

## 2024-01-31 PROCEDURE — 1123F ACP DISCUSS/DSCN MKR DOCD: CPT | Performed by: FAMILY MEDICINE

## 2024-01-31 PROCEDURE — G8484 FLU IMMUNIZE NO ADMIN: HCPCS | Performed by: FAMILY MEDICINE

## 2024-01-31 RX ORDER — HYDROCODONE BITARTRATE AND ACETAMINOPHEN 5; 325 MG/1; MG/1
1 TABLET ORAL EVERY 6 HOURS PRN
Qty: 20 TABLET | Refills: 0 | Status: SHIPPED | OUTPATIENT
Start: 2024-01-31 | End: 2024-02-05

## 2024-01-31 ASSESSMENT — PATIENT HEALTH QUESTIONNAIRE - PHQ9
1. LITTLE INTEREST OR PLEASURE IN DOING THINGS: 0
SUM OF ALL RESPONSES TO PHQ QUESTIONS 1-9: 0
2. FEELING DOWN, DEPRESSED OR HOPELESS: 0
SUM OF ALL RESPONSES TO PHQ QUESTIONS 1-9: 0
SUM OF ALL RESPONSES TO PHQ QUESTIONS 1-9: 0
SUM OF ALL RESPONSES TO PHQ9 QUESTIONS 1 & 2: 0
SUM OF ALL RESPONSES TO PHQ QUESTIONS 1-9: 0

## 2024-01-31 ASSESSMENT — ENCOUNTER SYMPTOMS
GASTROINTESTINAL NEGATIVE: 1
RESPIRATORY NEGATIVE: 1

## 2024-01-31 NOTE — PROGRESS NOTES
Chief Complaint   Patient presents with    Follow Up from Urgent Care     [facility] 1-25-24    Right Groin Pain    Right Hip & Leg Pain    Arthritis in Hip     HISTORY OF PRESENT ILLNESS   HPI  Patient presents for follow up from [facility] 1-25-24 after presenting there w/ severe right groin pain that gradually started the day prior. Described as a deep aching pain in the right groin region that feels like a \"bone on bone\" grinding sensation. No preceding falls or other injury or trauma. Noticed it when she was going from sitting to standing and gradually progressed through the day. Bothered her to walk to the degree that she could barely get around w/o assistance from others. By the next day she rated the pain a 15/10 which is what led her to [facility]. Xrays were done and revealed \"severe arthritis\". She was prescribed Tramadol and advised to follow up. She took one dose of the Tramadol and it caused N/V so she has not taken any more since. She has been taking Tylenol  mg 1 tab q6hr since then. Last night the pain was so bad she took a tablet of 800 mg of Motrin that someone gave to her. That eased the pain so she could get some rest. She has not taken any pain meds today and rates her pain a 10/10. She couldn't drive herself here today. She is having a hard time even using her walking cane.    The pain generates from deep in the right groin region.  Radiates into anterior thigh, lateral knee and down the front of the right lower leg into the top of her right foot.   The back of her right calf and knee feel tight and achy.  Feels a burning and tingling sensation in her right lower leg and top of right foot.  Rated 10/10 today w/o meds.  Pain is constant, aching, burning, grinding, tingling.  Pain is worse w/ prolonged sitting, going from sitting to standing, walking or standing. Hurts to turn/twist leg.   No numbness.  No weakness.  No saddle paresthesias.  No bowel/bladder dysfunction.  No leg

## 2024-01-31 NOTE — PROGRESS NOTES
Chief Complaint   Patient presents with    Follow Up from Urgent Care     [facility] 1-25-24    Right Groin Pain    Right Hip & Leg Pain    Arthritis in Hip     1. \"Have you been to the ER, urgent care clinic since your last visit?  Hospitalized since your last visit?\" [facility] 1[facility] for her groin pain    2. \"Have you seen or consulted any other health care providers outside of the [facility] since your last visit?\" No     3. For patients aged 45-75: Has the patient had a colonoscopy / FIT/ Cologuard? Yes - no Care Gap present 6/2017 Dr Montilla - follow up in 10 years       If the patient is female:    4. For patients aged 40-74: Has the patient had a mammogram within the past 2 years? Yes - no Care Gap present 1/2024      5. For patients aged 21-65: Has the patient had a pap smear? NA - based on age or sex

## 2024-02-07 ENCOUNTER — TELEPHONE (OUTPATIENT)
Age: 79
End: 2024-02-07

## 2024-02-08 NOTE — TELEPHONE ENCOUNTER
I ordered a doppler on patient 1-31-24 and still have no results. I went into the system and it doesn't look like it is even scheduled. Not sure if they missed patient or if she cancelled or what. Please call to check on status. Thank you

## 2024-02-08 NOTE — TELEPHONE ENCOUNTER
I spoke to pt and she said that they called her and scheduled her for 2/14.  They offered her an appt for tomorrow but pt already had an appt at that time.  The next available was the one they gave her for 2/14

## 2024-02-09 ENCOUNTER — OFFICE VISIT (OUTPATIENT)
Age: 79
End: 2024-02-09
Payer: MEDICARE

## 2024-02-09 ENCOUNTER — ANCILLARY PROCEDURE (OUTPATIENT)
Age: 79
End: 2024-02-09
Payer: MEDICARE

## 2024-02-09 ENCOUNTER — HOSPITAL ENCOUNTER (OUTPATIENT)
Facility: HOSPITAL | Age: 79
End: 2024-02-09
Attending: INTERNAL MEDICINE
Payer: MEDICARE

## 2024-02-09 VITALS
BODY MASS INDEX: 37.82 KG/M2 | WEIGHT: 227 LBS | HEART RATE: 79 BPM | RESPIRATION RATE: 16 BRPM | HEIGHT: 65 IN | OXYGEN SATURATION: 93 % | SYSTOLIC BLOOD PRESSURE: 120 MMHG | DIASTOLIC BLOOD PRESSURE: 80 MMHG

## 2024-02-09 VITALS
BODY MASS INDEX: 37.82 KG/M2 | DIASTOLIC BLOOD PRESSURE: 80 MMHG | WEIGHT: 227 LBS | HEIGHT: 65 IN | SYSTOLIC BLOOD PRESSURE: 138 MMHG

## 2024-02-09 DIAGNOSIS — I26.99 ACUTE PULMONARY EMBOLISM WITHOUT ACUTE COR PULMONALE, UNSPECIFIED PULMONARY EMBOLISM TYPE (HCC): ICD-10-CM

## 2024-02-09 DIAGNOSIS — I48.91 ATRIAL FIBRILLATION, UNSPECIFIED TYPE (HCC): ICD-10-CM

## 2024-02-09 DIAGNOSIS — N18.31 TYPE 2 DIABETES MELLITUS WITH STAGE 3A CHRONIC KIDNEY DISEASE, WITHOUT LONG-TERM CURRENT USE OF INSULIN (HCC): ICD-10-CM

## 2024-02-09 DIAGNOSIS — I47.19 AVNRT (AV NODAL RE-ENTRY TACHYCARDIA): ICD-10-CM

## 2024-02-09 DIAGNOSIS — E11.22 TYPE 2 DIABETES MELLITUS WITH STAGE 3A CHRONIC KIDNEY DISEASE, WITHOUT LONG-TERM CURRENT USE OF INSULIN (HCC): ICD-10-CM

## 2024-02-09 DIAGNOSIS — R60.9 EDEMA, UNSPECIFIED TYPE: ICD-10-CM

## 2024-02-09 DIAGNOSIS — I10 HTN (HYPERTENSION), BENIGN: ICD-10-CM

## 2024-02-09 DIAGNOSIS — I87.2 VENOUS INSUFFICIENCY OF BOTH LOWER EXTREMITIES: Primary | ICD-10-CM

## 2024-02-09 DIAGNOSIS — E78.2 MIXED HYPERLIPIDEMIA: ICD-10-CM

## 2024-02-09 DIAGNOSIS — I87.2 VENOUS INSUFFICIENCY OF BOTH LOWER EXTREMITIES: ICD-10-CM

## 2024-02-09 DIAGNOSIS — E66.01 SEVERE OBESITY (BMI 35.0-39.9) WITH COMORBIDITY (HCC): ICD-10-CM

## 2024-02-09 LAB
ECHO AO ASC DIAM: 4.1 CM
ECHO AO ASCENDING AORTA INDEX: 1.96 CM/M2
ECHO AO ROOT DIAM: 3.3 CM
ECHO AO ROOT INDEX: 1.58 CM/M2
ECHO AV AREA PEAK VELOCITY: 2.9 CM2
ECHO AV AREA VTI: 3.6 CM2
ECHO AV AREA/BSA PEAK VELOCITY: 1.4 CM2/M2
ECHO AV AREA/BSA VTI: 1.7 CM2/M2
ECHO AV MEAN GRADIENT: 4 MMHG
ECHO AV MEAN VELOCITY: 1 M/S
ECHO AV PEAK GRADIENT: 7 MMHG
ECHO AV PEAK VELOCITY: 1.3 M/S
ECHO AV VELOCITY RATIO: 0.69
ECHO AV VTI: 29 CM
ECHO BSA: 2.17 M2
ECHO BSA: 2.17 M2
ECHO EST RA PRESSURE: 3 MMHG
ECHO LA DIAMETER INDEX: 1.91 CM/M2
ECHO LA DIAMETER: 4 CM
ECHO LA TO AORTIC ROOT RATIO: 1.21
ECHO LA VOL A-L A2C: 45 ML (ref 22–52)
ECHO LA VOL A-L A4C: 38 ML (ref 22–52)
ECHO LA VOL BP: 40 ML (ref 22–52)
ECHO LA VOL MOD A2C: 44 ML (ref 22–52)
ECHO LA VOL MOD A4C: 35 ML (ref 22–52)
ECHO LA VOL/BSA BIPLANE: 19 ML/M2 (ref 16–34)
ECHO LA VOLUME AREA LENGTH: 42 ML
ECHO LA VOLUME INDEX A-L A2C: 22 ML/M2 (ref 16–34)
ECHO LA VOLUME INDEX A-L A4C: 18 ML/M2 (ref 16–34)
ECHO LA VOLUME INDEX AREA LENGTH: 20 ML/M2 (ref 16–34)
ECHO LA VOLUME INDEX MOD A2C: 21 ML/M2 (ref 16–34)
ECHO LA VOLUME INDEX MOD A4C: 17 ML/M2 (ref 16–34)
ECHO LV E' LATERAL VELOCITY: 7 CM/S
ECHO LV E' SEPTAL VELOCITY: 5 CM/S
ECHO LV FRACTIONAL SHORTENING: 63 % (ref 28–44)
ECHO LV INTERNAL DIMENSION DIASTOLE INDEX: 1.82 CM/M2
ECHO LV INTERNAL DIMENSION DIASTOLIC: 3.8 CM (ref 3.9–5.3)
ECHO LV INTERNAL DIMENSION SYSTOLIC INDEX: 0.67 CM/M2
ECHO LV INTERNAL DIMENSION SYSTOLIC: 1.4 CM
ECHO LV IVSD: 1.2 CM (ref 0.6–0.9)
ECHO LV MASS 2D: 153.2 G (ref 67–162)
ECHO LV MASS INDEX 2D: 73.3 G/M2 (ref 43–95)
ECHO LV POSTERIOR WALL DIASTOLIC: 1.2 CM (ref 0.6–0.9)
ECHO LV RELATIVE WALL THICKNESS RATIO: 0.63
ECHO LVOT AREA: 4.2 CM2
ECHO LVOT AV VTI INDEX: 0.87
ECHO LVOT DIAM: 2.3 CM
ECHO LVOT MEAN GRADIENT: 2 MMHG
ECHO LVOT PEAK GRADIENT: 3 MMHG
ECHO LVOT PEAK VELOCITY: 0.9 M/S
ECHO LVOT STROKE VOLUME INDEX: 50.3 ML/M2
ECHO LVOT SV: 105.1 ML
ECHO LVOT VTI: 25.3 CM
ECHO MV A VELOCITY: 1.24 M/S
ECHO MV E DECELERATION TIME (DT): 116.3 MS
ECHO MV E VELOCITY: 0.7 M/S
ECHO MV E/A RATIO: 0.56
ECHO MV E/E' LATERAL: 10
ECHO MV E/E' RATIO (AVERAGED): 12
ECHO RIGHT VENTRICULAR SYSTOLIC PRESSURE (RVSP): 48 MMHG
ECHO RV BASAL DIMENSION: 3.4 CM
ECHO RV FREE WALL PEAK S': 14 CM/S
ECHO RV TAPSE: 2.2 CM (ref 1.7–?)
ECHO TV REGURGITANT MAX VELOCITY: 3.35 M/S
ECHO TV REGURGITANT PEAK GRADIENT: 45 MMHG

## 2024-02-09 PROCEDURE — 99214 OFFICE O/P EST MOD 30 MIN: CPT | Performed by: INTERNAL MEDICINE

## 2024-02-09 PROCEDURE — 1090F PRES/ABSN URINE INCON ASSESS: CPT | Performed by: INTERNAL MEDICINE

## 2024-02-09 PROCEDURE — 93971 EXTREMITY STUDY: CPT

## 2024-02-09 PROCEDURE — 1123F ACP DISCUSS/DSCN MKR DOCD: CPT | Performed by: INTERNAL MEDICINE

## 2024-02-09 PROCEDURE — G8427 DOCREV CUR MEDS BY ELIG CLIN: HCPCS | Performed by: INTERNAL MEDICINE

## 2024-02-09 PROCEDURE — 3074F SYST BP LT 130 MM HG: CPT | Performed by: INTERNAL MEDICINE

## 2024-02-09 PROCEDURE — G8417 CALC BMI ABV UP PARAM F/U: HCPCS | Performed by: INTERNAL MEDICINE

## 2024-02-09 PROCEDURE — 93306 TTE W/DOPPLER COMPLETE: CPT | Performed by: INTERNAL MEDICINE

## 2024-02-09 PROCEDURE — G8400 PT W/DXA NO RESULTS DOC: HCPCS | Performed by: INTERNAL MEDICINE

## 2024-02-09 PROCEDURE — G8484 FLU IMMUNIZE NO ADMIN: HCPCS | Performed by: INTERNAL MEDICINE

## 2024-02-09 PROCEDURE — 3079F DIAST BP 80-89 MM HG: CPT | Performed by: INTERNAL MEDICINE

## 2024-02-09 PROCEDURE — 1036F TOBACCO NON-USER: CPT | Performed by: INTERNAL MEDICINE

## 2024-02-09 RX ORDER — TRAMADOL HYDROCHLORIDE 50 MG/1
TABLET ORAL
COMMUNITY
Start: 2024-01-27

## 2024-02-09 ASSESSMENT — PATIENT HEALTH QUESTIONNAIRE - PHQ9
SUM OF ALL RESPONSES TO PHQ9 QUESTIONS 1 & 2: 0
SUM OF ALL RESPONSES TO PHQ QUESTIONS 1-9: 0
1. LITTLE INTEREST OR PLEASURE IN DOING THINGS: 0
SUM OF ALL RESPONSES TO PHQ QUESTIONS 1-9: 0
2. FEELING DOWN, DEPRESSED OR HOPELESS: 0

## 2024-02-09 NOTE — PROGRESS NOTES
Patient: Demetra Andersen  : 1945    Primary Cardiologist: Marce Niño MD  EP Cardiologist: Dr. Guru Cm  PCP: Rebeca Mendoza MD    Today's Date: 2024    Echo today.  R leg pain - LE dopplers planned Wednesday - will ask if can be done sooner.  Has URI.        ASSESSMENT AND PLAN:     Assessment and Plan:  CP/elevated troponin  Cath , 2023 Dr. Foley no obstructive CAD  Would advocate no more caths  Imdur + spironolactone  Stop ranexa  Likely related to PE this last time   Echo     2. 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 HLD  Atorva 40  LDL 32    4.  PE 2023  Dr. Sampson Brown - expensive          ICD-10-CM    1. Venous insufficiency of both lower extremities  I87.2       2. Severe obesity (BMI 35.0-39.9) with comorbidity ([facility])  E66.01       3. AVNRT (AV nhung re-entry tachycardia)  I47.19       4. Type 2 diabetes mellitus with stage 3a chronic kidney disease, without long-term current use of insulin ([facility])  E11.22     N18.31       5. HTN (hypertension), benign  I10       6. Mixed hyperlipidemia  E78.2       7. Acute pulmonary embolism without acute cor pulmonale, unspecified pulmonary embolism type ([facility])  I26.99       8. Edema, unspecified type  R60.9             HISTORY OF PRESENT ILLNESS:     History of Present Illness:  Demetra Andersen is a 78 y.o. female who presents for FU.       Seen at my prior practice - admitted [facility] 2019 - NSTEMI - echo normal LV fxn, mild LVH.  Cath no obstructive CAD.  Placed on medical therapy.     In the interim was admitted to [facility] with AVNRT sp ablation Dr. Cm 2021.  All went well.     TKR 2023 .     Admission [facility] 2023 - elevated troponin - Dr. Foley repeated a cath - no CAD.  Found to have PE via V/Q scan - states her kidney function would not allow CTPA?  Creatinine 1.09.    Placed on eliquis.  Seeing Dr. Braden.  Eliquis expensive.     Today conveys LE edema dn weight gain.

## 2024-02-09 NOTE — PATIENT INSTRUCTIONS
Your lower extremity (right) has been rescheduled to TODAY at 4:00pm at [address] [facility] [address] ([phone])    Follow with Dr. Niño in 6 months

## 2024-02-12 LAB — ECHO BSA: 2.17 M2

## 2024-02-13 ENCOUNTER — TELEPHONE (OUTPATIENT)
Age: 79
End: 2024-02-13

## 2024-02-13 NOTE — TELEPHONE ENCOUNTER
Spoke with patient after ID x2 patient has ongoing pain she is concerned about and would like to know whom she should follow up.      Per verbal order from Dr. Niño patient should follow up with ortho.       ----- Message from Marce Niño MD sent at 2/12/2024  2:11 PM EST -----  Hi Ms. Andersen,    There was no evidence of clots in your legs.    Dr. Niño

## 2024-02-27 RX ORDER — SPIRONOLACTONE 50 MG/1
50 TABLET, FILM COATED ORAL 2 TIMES DAILY
Qty: 180 TABLET | Refills: 3 | OUTPATIENT
Start: 2024-02-27

## 2024-02-27 NOTE — TELEPHONE ENCOUNTER
I called pt to see date on the aldactone bottle b/c last refill by us was 4/26/23 for #180 + 1R.  Pt said the bottle is dated 4/26/24 #180 +2 refill and she had 2 refills left until 4/25/24.  Pt said that she may have kept the wrong bottle b/c she had it filled since then.  She said that she doesn't need a refill now b/c she has plenty.  I called [facility] and spoke to pharmacist she said that they had a refill from us on 11/23/22 they sent that to pt and then another bottle on 2/10/23.  Another refill from us on 4/26/23 which they filled and they filled again on 7/12/23 and 9/21/23.  So it does look like pt is taking them as directed.      LOV 1/31/24 for groin pain, 12/6/23 for AWV due 6 month f/u in June, I scheduled her for fasting f/u on 6/6 @ 9:40   Last labs 11/14/23

## 2024-04-23 RX ORDER — METOPROLOL SUCCINATE 25 MG/1
TABLET, EXTENDED RELEASE ORAL
Qty: 90 TABLET | Refills: 0 | Status: SHIPPED | OUTPATIENT
Start: 2024-04-23

## 2024-05-15 NOTE — TELEPHONE ENCOUNTER
Last appointment: 1/31/24 MD ALMANZA  Next appointment: 6/19/24 MD ALMANZA  Previous refill encounter(s): 4/26/23 180 + 1    Requested Prescriptions     Pending Prescriptions Disp Refills    spironolactone (ALDACTONE) 50 MG tablet 180 tablet 1     Sig: Take 1 tablet by mouth 2 times daily     For Pharmacy Admin Tracking Only    Program: Medication Refill  CPA in place:    Recommendation Provided To:   Intervention Detail: New Rx: 1, reason: Patient Preference  Intervention Accepted By:   Gap Closed?:    Time Spent (min): 5

## 2024-05-17 RX ORDER — SPIRONOLACTONE 50 MG/1
50 TABLET, FILM COATED ORAL 2 TIMES DAILY
Qty: 180 TABLET | Refills: 1 | Status: SHIPPED | OUTPATIENT
Start: 2024-05-17

## 2024-05-17 RX ORDER — SPIRONOLACTONE 50 MG/1
50 TABLET, FILM COATED ORAL 2 TIMES DAILY
Qty: 60 TABLET | Refills: 0 | OUTPATIENT
Start: 2024-05-17

## 2024-06-19 ENCOUNTER — OFFICE VISIT (OUTPATIENT)
Age: 79
End: 2024-06-19
Payer: MEDICARE

## 2024-06-19 VITALS
RESPIRATION RATE: 16 BRPM | BODY MASS INDEX: 37.29 KG/M2 | HEIGHT: 65 IN | OXYGEN SATURATION: 97 % | TEMPERATURE: 97.8 F | HEART RATE: 89 BPM | WEIGHT: 223.8 LBS | DIASTOLIC BLOOD PRESSURE: 66 MMHG | SYSTOLIC BLOOD PRESSURE: 108 MMHG

## 2024-06-19 DIAGNOSIS — E78.2 MIXED HYPERLIPIDEMIA: ICD-10-CM

## 2024-06-19 DIAGNOSIS — I25.2 HISTORY OF NON-ST ELEVATION MYOCARDIAL INFARCTION (NSTEMI): ICD-10-CM

## 2024-06-19 DIAGNOSIS — I87.2 CHRONIC VENOUS STASIS DERMATITIS OF BOTH LOWER EXTREMITIES: ICD-10-CM

## 2024-06-19 DIAGNOSIS — M25.571 CHRONIC PAIN OF RIGHT ANKLE: ICD-10-CM

## 2024-06-19 DIAGNOSIS — L85.3 DRY SKIN DERMATITIS: ICD-10-CM

## 2024-06-19 DIAGNOSIS — E11.22 TYPE 2 DIABETES MELLITUS WITH STAGE 3A CHRONIC KIDNEY DISEASE, WITHOUT LONG-TERM CURRENT USE OF INSULIN (HCC): Primary | ICD-10-CM

## 2024-06-19 DIAGNOSIS — I87.2 VENOUS INSUFFICIENCY OF BOTH LOWER EXTREMITIES: ICD-10-CM

## 2024-06-19 DIAGNOSIS — I10 BENIGN ESSENTIAL HYPERTENSION: ICD-10-CM

## 2024-06-19 DIAGNOSIS — G89.29 CHRONIC PAIN OF RIGHT ANKLE: ICD-10-CM

## 2024-06-19 DIAGNOSIS — N18.31 TYPE 2 DIABETES MELLITUS WITH STAGE 3A CHRONIC KIDNEY DISEASE, WITHOUT LONG-TERM CURRENT USE OF INSULIN (HCC): Primary | ICD-10-CM

## 2024-06-19 PROCEDURE — 99214 OFFICE O/P EST MOD 30 MIN: CPT | Performed by: FAMILY MEDICINE

## 2024-06-19 PROCEDURE — 1123F ACP DISCUSS/DSCN MKR DOCD: CPT | Performed by: FAMILY MEDICINE

## 2024-06-19 PROCEDURE — 1036F TOBACCO NON-USER: CPT | Performed by: FAMILY MEDICINE

## 2024-06-19 PROCEDURE — G8417 CALC BMI ABV UP PARAM F/U: HCPCS | Performed by: FAMILY MEDICINE

## 2024-06-19 PROCEDURE — G8400 PT W/DXA NO RESULTS DOC: HCPCS | Performed by: FAMILY MEDICINE

## 2024-06-19 PROCEDURE — 3078F DIAST BP <80 MM HG: CPT | Performed by: FAMILY MEDICINE

## 2024-06-19 PROCEDURE — G8427 DOCREV CUR MEDS BY ELIG CLIN: HCPCS | Performed by: FAMILY MEDICINE

## 2024-06-19 PROCEDURE — 1090F PRES/ABSN URINE INCON ASSESS: CPT | Performed by: FAMILY MEDICINE

## 2024-06-19 PROCEDURE — 3074F SYST BP LT 130 MM HG: CPT | Performed by: FAMILY MEDICINE

## 2024-06-19 RX ORDER — AMMONIUM LACTATE 12 G/100G
LOTION TOPICAL
Qty: 225 G | Refills: 3 | Status: SHIPPED | OUTPATIENT
Start: 2024-06-19

## 2024-06-19 ASSESSMENT — PATIENT HEALTH QUESTIONNAIRE - PHQ9
SUM OF ALL RESPONSES TO PHQ QUESTIONS 1-9: 0
SUM OF ALL RESPONSES TO PHQ QUESTIONS 1-9: 0
2. FEELING DOWN, DEPRESSED OR HOPELESS: NOT AT ALL
1. LITTLE INTEREST OR PLEASURE IN DOING THINGS: NOT AT ALL
SUM OF ALL RESPONSES TO PHQ9 QUESTIONS 1 & 2: 0
SUM OF ALL RESPONSES TO PHQ QUESTIONS 1-9: 0
SUM OF ALL RESPONSES TO PHQ QUESTIONS 1-9: 0

## 2024-06-19 ASSESSMENT — ENCOUNTER SYMPTOMS
NAUSEA: 0
COUGH: 0
VOMITING: 0
SHORTNESS OF BREATH: 0
BLOOD IN STOOL: 0
DIARRHEA: 0
ABDOMINAL PAIN: 0
WHEEZING: 0
CONSTIPATION: 0

## 2024-06-19 NOTE — PROGRESS NOTES
Chief Complaint   Patient presents with    6 Month Follow-Up Diabetes     1. \"Have you been to the ER, urgent care clinic since your last visit?  Hospitalized since your last visit?\" No    2. \"Have you seen or consulted any other health care providers outside of the [facility] since your last visit?\"  Ortho Dr EVI Carroll for her knee, pulm Dr Lancaster  ~ April    3. For patients aged 45-75: Has the patient had a colonoscopy / FIT/ Cologuard? NA - based on age      If the patient is female:    4. For patients aged 40-74: Has the patient had a mammogram within the past 2 years? Yes - no Care Gap present 1/2024      5. For patients aged 21-65: Has the patient had a pap smear? NA - based on age or sex      
Hypertension Mother     Anesth Problems Neg Hx     Heart Attack Paternal Grandfather          in his 70's         VITALS   /66 (Site: Left Upper Arm, Position: Sitting, Cuff Size: Large Adult)   Pulse 89   Temp 97.8 °F (36.6 °C) (Oral)   Resp 16   Ht 1.651 m (5' 5\")   Wt 101.5 kg (223 lb 12.8 oz)   SpO2 97%   BMI 37.24 kg/m²     Vitals:    24 1142   BP: 108/66   Site: Left Upper Arm   Position: Sitting   Cuff Size: Large Adult   Pulse: 89   Resp: 16   Temp: 97.8 °F (36.6 °C)   TempSrc: Oral   SpO2: 97%   Weight: 101.5 kg (223 lb 12.8 oz)   Height: 1.651 m (5' 5\")          PHYSICAL EXAMINATION   Physical Exam  Vitals reviewed.   Constitutional:       General: She is not in acute distress.     Appearance: She is not ill-appearing.   Cardiovascular:      Rate and Rhythm: Normal rate and regular rhythm.   Pulmonary:      Effort: Pulmonary effort is normal. No respiratory distress.      Breath sounds: Normal breath sounds. No wheezing.   Abdominal:      Palpations: Abdomen is soft.      Tenderness: There is no abdominal tenderness.   Musculoskeletal:      Cervical back: Neck supple.      Comments: Moderate edema BLE. Skin dry, chapped, cracked.  No erythema, weeping, skin breakdown, wounds or lesions.   Neurological:      Mental Status: She is alert and oriented to person, place, and time.   Psychiatric:         Mood and Affect: Mood normal.           ASSESSMENT & PLAN     1. Type 2 diabetes mellitus with stage 3a chronic kidney disease, without long-term current use of insulin ([facility])  A1c stable over time and remains at goal diabetic range for age at 6.0-6.3 over the past several years  Not treated with antihyperglycemic agents at this time  Follow sugar-free, low-carb diet  Activity/exercise as tolerated  Work on weight reduction  Renal function stable and baseline range over time.  Released by nephrology to follow-up as needed only  Continue NSAID avoidance  -     CBC; Future  -     Comprehensive

## 2024-06-20 LAB
ALBUMIN SERPL-MCNC: 4.3 G/DL (ref 3.5–5)
ALBUMIN/GLOB SERPL: 1.2 (ref 1.1–2.2)
ALP SERPL-CCNC: 150 U/L (ref 45–117)
ALT SERPL-CCNC: 20 U/L (ref 12–78)
ANION GAP SERPL CALC-SCNC: 4 MMOL/L (ref 5–15)
AST SERPL-CCNC: 24 U/L (ref 15–37)
BILIRUB SERPL-MCNC: 0.5 MG/DL (ref 0.2–1)
BUN SERPL-MCNC: 34 MG/DL (ref 6–20)
BUN/CREAT SERPL: 20 (ref 12–20)
CALCIUM SERPL-MCNC: 10 MG/DL (ref 8.5–10.1)
CHLORIDE SERPL-SCNC: 107 MMOL/L (ref 97–108)
CHOLEST SERPL-MCNC: 127 MG/DL
CO2 SERPL-SCNC: 26 MMOL/L (ref 21–32)
CREAT SERPL-MCNC: 1.71 MG/DL (ref 0.55–1.02)
ERYTHROCYTE [DISTWIDTH] IN BLOOD BY AUTOMATED COUNT: 13.9 % (ref 11.5–14.5)
EST. AVERAGE GLUCOSE BLD GHB EST-MCNC: 134 MG/DL
GLOBULIN SER CALC-MCNC: 3.7 G/DL (ref 2–4)
GLUCOSE SERPL-MCNC: 117 MG/DL (ref 65–100)
HBA1C MFR BLD: 6.3 % (ref 4–5.6)
HCT VFR BLD AUTO: 40.2 % (ref 35–47)
HDLC SERPL-MCNC: 53 MG/DL
HDLC SERPL: 2.4 (ref 0–5)
HGB BLD-MCNC: 13.1 G/DL (ref 11.5–16)
LDLC SERPL CALC-MCNC: 60.2 MG/DL (ref 0–100)
MCH RBC QN AUTO: 31 PG (ref 26–34)
MCHC RBC AUTO-ENTMCNC: 32.6 G/DL (ref 30–36.5)
MCV RBC AUTO: 95.3 FL (ref 80–99)
NRBC # BLD: 0 K/UL (ref 0–0.01)
NRBC BLD-RTO: 0 PER 100 WBC
PLATELET # BLD AUTO: 305 K/UL (ref 150–400)
PMV BLD AUTO: 10.1 FL (ref 8.9–12.9)
POTASSIUM SERPL-SCNC: 5.4 MMOL/L (ref 3.5–5.1)
PROT SERPL-MCNC: 8 G/DL (ref 6.4–8.2)
RBC # BLD AUTO: 4.22 M/UL (ref 3.8–5.2)
SODIUM SERPL-SCNC: 137 MMOL/L (ref 136–145)
TRIGL SERPL-MCNC: 69 MG/DL
TSH SERPL DL<=0.05 MIU/L-ACNC: 1.29 UIU/ML (ref 0.36–3.74)
VLDLC SERPL CALC-MCNC: 13.8 MG/DL
WBC # BLD AUTO: 11.3 K/UL (ref 3.6–11)

## 2024-06-23 ENCOUNTER — TELEPHONE (OUTPATIENT)
Age: 79
End: 2024-06-23

## 2024-06-23 DIAGNOSIS — D72.829 LEUKOCYTOSIS, UNSPECIFIED TYPE: ICD-10-CM

## 2024-06-23 DIAGNOSIS — E87.5 HYPERKALEMIA: ICD-10-CM

## 2024-06-23 DIAGNOSIS — E11.22 TYPE 2 DIABETES MELLITUS WITH STAGE 3A CHRONIC KIDNEY DISEASE, WITHOUT LONG-TERM CURRENT USE OF INSULIN (HCC): Primary | ICD-10-CM

## 2024-06-23 DIAGNOSIS — N18.31 TYPE 2 DIABETES MELLITUS WITH STAGE 3A CHRONIC KIDNEY DISEASE, WITHOUT LONG-TERM CURRENT USE OF INSULIN (HCC): Primary | ICD-10-CM

## 2024-06-23 NOTE — TELEPHONE ENCOUNTER
Advise patient her A1c 3-month sugar average has gone up slightly from last check but remains in her baseline range over time and also remains at diabetic goal range for her age.  Just be mindful of sugars and limit her carb intake.    Her cholesterol numbers are all very good and at goal range.  Continue her current regimen of cholesterol medication at this time.      Her potassium level is mildly elevated which may be due to a lab technicality or it could be related to a combination of her medications as well as a decline in her kidney function from her baseline.  The kidney function may have declined a bit given that her cardiologist has added the furosemide diuretic to her regimen of medications.  She is on Aldactone in addition to this and it may just be too much.  So what I would recommend at this time would be to have her reduce the Aldactone from 50 mg twice daily to just 1 tablet daily instead.  Continue her Lasix regimen as ordered by Dr. Niño    Her white blood count is mildly elevated which can be due to infection, stress, steroids, or other causes.  Will plan on reassessing this with her next follow-up labs which I recommend having her do in about 6 weeks.  I have pended all of her follow-up lab orders and she may either have those done at our office via a lab appointment or she can go to one of our walk-in lab draw stations closer to her home in [place].  You can give her those locations and hours.  She does NOT need to fast for those labs.

## 2024-06-24 NOTE — TELEPHONE ENCOUNTER
Attempted to contact patient. Left voicemail requesting call back to discuss lab results per provider.    Ramila Loomis, CMA

## 2024-06-24 NOTE — TELEPHONE ENCOUNTER
Inbound call from patient. Two patient identifiers verified. Discussed lab results per provider's note. Scheduled patients 6 week lab only appointment and confirmed her next appointment with Dr. Mendoza. Patient verbalized understanding and was satisfied.    Ramila Loomis CMA

## 2024-07-07 RX ORDER — METOPROLOL SUCCINATE 25 MG/1
TABLET, EXTENDED RELEASE ORAL
Qty: 90 TABLET | Refills: 2 | Status: SHIPPED | OUTPATIENT
Start: 2024-07-07

## 2024-07-16 ENCOUNTER — OFFICE VISIT (OUTPATIENT)
Age: 79
End: 2024-07-16
Payer: MEDICARE

## 2024-07-16 VITALS
WEIGHT: 224.2 LBS | BODY MASS INDEX: 37.36 KG/M2 | HEIGHT: 65 IN | OXYGEN SATURATION: 97 % | HEART RATE: 74 BPM | TEMPERATURE: 98.1 F | DIASTOLIC BLOOD PRESSURE: 68 MMHG | RESPIRATION RATE: 16 BRPM | SYSTOLIC BLOOD PRESSURE: 122 MMHG

## 2024-07-16 DIAGNOSIS — E87.5 HYPERKALEMIA: ICD-10-CM

## 2024-07-16 DIAGNOSIS — E11.22 TYPE 2 DIABETES MELLITUS WITH STAGE 3A CHRONIC KIDNEY DISEASE, WITHOUT LONG-TERM CURRENT USE OF INSULIN (HCC): ICD-10-CM

## 2024-07-16 DIAGNOSIS — Z00.00 MEDICARE ANNUAL WELLNESS VISIT, SUBSEQUENT: Primary | ICD-10-CM

## 2024-07-16 DIAGNOSIS — N18.31 TYPE 2 DIABETES MELLITUS WITH STAGE 3A CHRONIC KIDNEY DISEASE, WITHOUT LONG-TERM CURRENT USE OF INSULIN (HCC): ICD-10-CM

## 2024-07-16 DIAGNOSIS — I10 BENIGN ESSENTIAL HYPERTENSION: ICD-10-CM

## 2024-07-16 DIAGNOSIS — D72.829 LEUKOCYTOSIS, UNSPECIFIED TYPE: ICD-10-CM

## 2024-07-16 DIAGNOSIS — I87.2 VENOUS INSUFFICIENCY OF BOTH LOWER EXTREMITIES: ICD-10-CM

## 2024-07-16 LAB
ANION GAP SERPL CALC-SCNC: 6 MMOL/L (ref 5–15)
BASOPHILS # BLD: 0 K/UL (ref 0–0.1)
BASOPHILS NFR BLD: 0 % (ref 0–1)
BUN SERPL-MCNC: 19 MG/DL (ref 6–20)
BUN/CREAT SERPL: 13 (ref 12–20)
CALCIUM SERPL-MCNC: 9.7 MG/DL (ref 8.5–10.1)
CHLORIDE SERPL-SCNC: 105 MMOL/L (ref 97–108)
CO2 SERPL-SCNC: 30 MMOL/L (ref 21–32)
CREAT SERPL-MCNC: 1.43 MG/DL (ref 0.55–1.02)
DIFFERENTIAL METHOD BLD: ABNORMAL
EOSINOPHIL # BLD: 0.2 K/UL (ref 0–0.4)
EOSINOPHIL NFR BLD: 2 % (ref 0–7)
ERYTHROCYTE [DISTWIDTH] IN BLOOD BY AUTOMATED COUNT: 13.3 % (ref 11.5–14.5)
GLUCOSE SERPL-MCNC: 122 MG/DL (ref 65–100)
HCT VFR BLD AUTO: 38.3 % (ref 35–47)
HGB BLD-MCNC: 12.4 G/DL (ref 11.5–16)
IMM GRANULOCYTES # BLD AUTO: 0.1 K/UL (ref 0–0.04)
IMM GRANULOCYTES NFR BLD AUTO: 1 % (ref 0–0.5)
LYMPHOCYTES # BLD: 2 K/UL (ref 0.8–3.5)
LYMPHOCYTES NFR BLD: 17 % (ref 12–49)
MCH RBC QN AUTO: 30.8 PG (ref 26–34)
MCHC RBC AUTO-ENTMCNC: 32.4 G/DL (ref 30–36.5)
MCV RBC AUTO: 95.3 FL (ref 80–99)
MONOCYTES # BLD: 1.1 K/UL (ref 0–1)
MONOCYTES NFR BLD: 9 % (ref 5–13)
NEUTS SEG # BLD: 8.2 K/UL (ref 1.8–8)
NEUTS SEG NFR BLD: 71 % (ref 32–75)
NRBC # BLD: 0 K/UL (ref 0–0.01)
NRBC BLD-RTO: 0 PER 100 WBC
PLATELET # BLD AUTO: 299 K/UL (ref 150–400)
PMV BLD AUTO: 10 FL (ref 8.9–12.9)
POTASSIUM SERPL-SCNC: 4.7 MMOL/L (ref 3.5–5.1)
RBC # BLD AUTO: 4.02 M/UL (ref 3.8–5.2)
SODIUM SERPL-SCNC: 141 MMOL/L (ref 136–145)
WBC # BLD AUTO: 11.5 K/UL (ref 3.6–11)

## 2024-07-16 PROCEDURE — 3078F DIAST BP <80 MM HG: CPT | Performed by: FAMILY MEDICINE

## 2024-07-16 PROCEDURE — G8417 CALC BMI ABV UP PARAM F/U: HCPCS | Performed by: FAMILY MEDICINE

## 2024-07-16 PROCEDURE — G8400 PT W/DXA NO RESULTS DOC: HCPCS | Performed by: FAMILY MEDICINE

## 2024-07-16 PROCEDURE — 1123F ACP DISCUSS/DSCN MKR DOCD: CPT | Performed by: FAMILY MEDICINE

## 2024-07-16 PROCEDURE — 99214 OFFICE O/P EST MOD 30 MIN: CPT | Performed by: FAMILY MEDICINE

## 2024-07-16 PROCEDURE — 1090F PRES/ABSN URINE INCON ASSESS: CPT | Performed by: FAMILY MEDICINE

## 2024-07-16 PROCEDURE — G0439 PPPS, SUBSEQ VISIT: HCPCS | Performed by: FAMILY MEDICINE

## 2024-07-16 PROCEDURE — 3074F SYST BP LT 130 MM HG: CPT | Performed by: FAMILY MEDICINE

## 2024-07-16 PROCEDURE — 3044F HG A1C LEVEL LT 7.0%: CPT | Performed by: FAMILY MEDICINE

## 2024-07-16 PROCEDURE — G8427 DOCREV CUR MEDS BY ELIG CLIN: HCPCS | Performed by: FAMILY MEDICINE

## 2024-07-16 PROCEDURE — 1036F TOBACCO NON-USER: CPT | Performed by: FAMILY MEDICINE

## 2024-07-16 ASSESSMENT — PATIENT HEALTH QUESTIONNAIRE - PHQ9
SUM OF ALL RESPONSES TO PHQ QUESTIONS 1-9: 0
2. FEELING DOWN, DEPRESSED OR HOPELESS: NOT AT ALL
SUM OF ALL RESPONSES TO PHQ9 QUESTIONS 1 & 2: 0
SUM OF ALL RESPONSES TO PHQ QUESTIONS 1-9: 0
SUM OF ALL RESPONSES TO PHQ QUESTIONS 1-9: 0
1. LITTLE INTEREST OR PLEASURE IN DOING THINGS: NOT AT ALL
SUM OF ALL RESPONSES TO PHQ QUESTIONS 1-9: 0

## 2024-07-16 ASSESSMENT — ENCOUNTER SYMPTOMS
RESPIRATORY NEGATIVE: 1
GASTROINTESTINAL NEGATIVE: 1

## 2024-07-16 NOTE — PROGRESS NOTES
Chief Complaint   Patient presents with    Medicare AWV         Cholesterol Problem     fasting    Diabetes    Hypertension     1. \"Have you been to the ER, urgent care clinic since your last visit?  Hospitalized since your last visit?\" No    2. \"Have you seen or consulted any other health care providers outside of the [facility] since your last visit?\" No     3. For patients aged 45-75: Has the patient had a colonoscopy / FIT/ Cologuard? NA - based on age      If the patient is female:    4. For patients aged 40-74: Has the patient had a mammogram within the past 2 years? Yes - no Care Gap present 1/2024      5. For patients aged 21-65: Has the patient had a pap smear? NA - based on age or sex

## 2024-07-16 NOTE — PROGRESS NOTES
Chief Complaint   Patient presents with    Medicare AWV         Follow Up BP and Lab Check     HISTORY OF PRESENT ILLNESS   HPI  Patient with history of type II non-insulin-dependent diabetes, stage IIIa chronic kidney disease, mixed hyperlipidemia, benign essential hypertension, chronic venous insufficiency of BLE, and obesity presents for 1 month follow up BP check and lab follow up. After her visit one month ago she was advised to decrease her Aldactone 50 mg from twice daily to once daily due to mild hyperkalemia and a rise in her creatinine.  Since decreasing to 1 tablet she has not noticed a rise in her blood pressure or increase in her leg swelling.  She continues on the Lasix regimen daily. Her weight is only up 1 pound and she has no new symptoms.  She is also been using the Lac-Hydrin lotion that I prescribed last visit and has noticed a remarkable improvement in the dry itchy skin.       REVIEW OF SYMPTOMS   Review of Systems   Constitutional: Negative.  Negative for chills, fever and unexpected weight change.   Respiratory: Negative.     Cardiovascular:  Negative for chest pain.   Gastrointestinal: Negative.    Genitourinary: Negative.    Neurological: Negative.              PROBLEM LIST/MEDICAL HISTORY     Patient Active Problem List    Diagnosis Date Noted    Acute pulmonary embolism without acute cor pulmonale ([facility]) 02/09/2024    AVNRT (AV nhung re-entry tachycardia) ([facility]) 11/20/2023    History of pulmonary embolism 08/24/2023     Overview Note:     CJW 8/3/23-8/7/23; + VQ Scan; started on Eliquis 5 mg, bid; Pulmonary Dr. Lancaster      Severe obesity (BMI 35.0-39.9) with comorbidity ([facility]) 05/16/2023    Type 2 diabetes mellitus with stage 3a chronic kidney disease, without long-term current use of insulin ([facility]) 11/11/2021     Overview Note:     Diagnosed Type 2 NIDDM 5/2012, HgbA1c 6.5; CKD 2012; Dr Guerin et al; Renal US 2/2013; follow up prn; Eye doctor: [facility]

## 2024-07-16 NOTE — PROGRESS NOTES
Medicare Annual Wellness Visit    Demetra Andersen is here for Medicare AWV () and Follow Up BP and Lab Check      Refer to separate note in this encounter for follow up of patient's chronic conditions, disease management, problems addressed today, and/or other health concerns as noted.     Assessment & Plan   Medicare annual wellness visit, subsequent  Recommendations for Preventive Services Due: see orders and patient instructions/AVS.  Recommended screening schedule for the next 5-10 years is provided to the patient in written form: see Patient Instructions/AVS.     Return in 6 months (on 1/16/2025).         Patient's complete Health Risk Assessment and screening values have been reviewed and are found in Flowsheets. The following problems were reviewed today and where indicated follow up appointments were made and/or referrals ordered.    Positive Risk Factor Screenings with Interventions:    Fall Risk:  Do you feel unsteady or are you worried about falling? : (!) yes  2 or more falls in past year?: no  Fall with injury in past year?: no     Interventions:    Reviewed medications, home hazards, visual acuity, and co-morbidities that can increase risk for falls  See AVS for additional education material             Abnormal BMI (obese):  Body mass index is 37.31 kg/m². (!) Abnormal  Interventions:  See AVS for additional education material  See A/P for plan and any pertinent orders        Dentist Screen:  Have you seen the dentist within the past year?: (!) No    Intervention:  Advised to schedule with their dentist     Vision Screen:  Do you have difficulty driving, watching TV, or doing any of your daily activities because of your eyesight?: No  Have you had an eye exam within the past year?: (!) No    Interventions:   Patient encouraged to make appointment with their eye specialist      Advanced Directives:  Do you have a Living Will?: (!) No    Intervention:  has NO advanced directive - information provided

## 2024-07-20 ENCOUNTER — TELEPHONE (OUTPATIENT)
Age: 79
End: 2024-07-20

## 2024-07-20 NOTE — TELEPHONE ENCOUNTER
Advise patient her potassium is back to normal and the rest of her electrolytes are chito. Her kidney function has improved some as well and is going back toward her baseline range. Stay on the lower dose of the Aldactone just 1 tablet daily. Being on the Lasix with it can make her more on the \"dry side\" so she just needs to be sure she stays well hydrated. And remember to avoid Nsaids.     Her white blood count is still slightly elevated, but just mild.  Has she been taking prednisone recently or gotten any steroid injections recently? If so, one of these is likely the cause and we can just reassess at her next routine follow up, schedule 6 months. If she has not been treated w/ steroids/prednisone recently, will need to repeat her CBC in about 6 weeks. Let me know.

## 2024-07-24 DIAGNOSIS — L85.3 DRY SKIN DERMATITIS: ICD-10-CM

## 2024-07-24 DIAGNOSIS — I87.2 CHRONIC VENOUS STASIS DERMATITIS OF BOTH LOWER EXTREMITIES: Primary | ICD-10-CM

## 2024-07-25 RX ORDER — AMMONIUM LACTATE 12 G/100G
LOTION TOPICAL
Qty: 225 G | Refills: 1 | Status: SHIPPED | OUTPATIENT
Start: 2024-07-25

## 2024-07-25 NOTE — TELEPHONE ENCOUNTER
PCP: Rebeca Mendoza MD    Last appt: 7/16/2024     Future Appointments   Date Time Provider Department Center   8/6/2024 10:00 AM LAB ONLY [facility]   8/9/2024 11:20 AM Marce Niño MD [facility]       Requested Prescriptions     Pending Prescriptions Disp Refills    ammonium lactate (LAC-HYDRIN) 12 % lotion [Pharmacy Med Name: AMMONIUM LAC 12% LOT 400GM]  0       Prior labs and Blood pressures:  BP Readings from Last 3 Encounters:   07/16/24 122/68   06/19/24 108/66   02/09/24 120/80     Lab Results   Component Value Date/Time     07/16/2024 10:49 AM    K 4.7 07/16/2024 10:49 AM     07/16/2024 10:49 AM    CO2 30 07/16/2024 10:49 AM    BUN 19 07/16/2024 10:49 AM    GFRAA 59 11/11/2021 10:52 AM     Lab Results   Component Value Date/Time    PHG7QLQI 6.0 06/23/2022 10:43 AM     Lab Results   Component Value Date/Time    CHOL 127 06/19/2024 12:27 PM    HDL 53 06/19/2024 12:27 PM    LDL 60.2 06/19/2024 12:27 PM    LDL 32.2 11/14/2023 10:38 AM    VLDL 13.8 06/19/2024 12:27 PM    VLDL 21 09/14/2020 10:46 AM     No results found for: \"VITD3\"    Lab Results   Component Value Date/Time    TSH 1.29 06/19/2024 12:27 PM

## 2024-07-26 DIAGNOSIS — D72.829 LEUKOCYTOSIS, UNSPECIFIED TYPE: Primary | ICD-10-CM

## 2024-07-26 NOTE — TELEPHONE ENCOUNTER
Call and spoke to patient, two ID confirmed.        Patient stated she will do labs in 6 weeks due to she has not been on any steroids/prednisone to recheck CBC    She has a lab appointment on 8/6/24, which she do not need to keep, right.      Also scheduled 6 month appointment. 1/21/25.

## 2024-07-26 NOTE — TELEPHONE ENCOUNTER
Lab order placed for CBC to be done in 6 weeks. And correct she will not need the lab appt she has for 8/6.

## 2024-08-09 ENCOUNTER — OFFICE VISIT (OUTPATIENT)
Age: 79
End: 2024-08-09
Payer: MEDICARE

## 2024-08-09 VITALS
DIASTOLIC BLOOD PRESSURE: 72 MMHG | BODY MASS INDEX: 36.65 KG/M2 | WEIGHT: 220 LBS | OXYGEN SATURATION: 98 % | HEIGHT: 65 IN | HEART RATE: 75 BPM | RESPIRATION RATE: 18 BRPM | SYSTOLIC BLOOD PRESSURE: 120 MMHG

## 2024-08-09 DIAGNOSIS — I10 HTN (HYPERTENSION), BENIGN: ICD-10-CM

## 2024-08-09 DIAGNOSIS — I48.91 ATRIAL FIBRILLATION, UNSPECIFIED TYPE (HCC): Primary | ICD-10-CM

## 2024-08-09 DIAGNOSIS — R06.09 DOE (DYSPNEA ON EXERTION): ICD-10-CM

## 2024-08-09 DIAGNOSIS — R60.9 EDEMA, UNSPECIFIED TYPE: ICD-10-CM

## 2024-08-09 DIAGNOSIS — E78.2 MIXED HYPERLIPIDEMIA: ICD-10-CM

## 2024-08-09 PROCEDURE — 3078F DIAST BP <80 MM HG: CPT | Performed by: INTERNAL MEDICINE

## 2024-08-09 PROCEDURE — G8427 DOCREV CUR MEDS BY ELIG CLIN: HCPCS | Performed by: INTERNAL MEDICINE

## 2024-08-09 PROCEDURE — G8417 CALC BMI ABV UP PARAM F/U: HCPCS | Performed by: INTERNAL MEDICINE

## 2024-08-09 PROCEDURE — 99214 OFFICE O/P EST MOD 30 MIN: CPT | Performed by: INTERNAL MEDICINE

## 2024-08-09 PROCEDURE — 1036F TOBACCO NON-USER: CPT | Performed by: INTERNAL MEDICINE

## 2024-08-09 PROCEDURE — 93005 ELECTROCARDIOGRAM TRACING: CPT | Performed by: INTERNAL MEDICINE

## 2024-08-09 PROCEDURE — G8400 PT W/DXA NO RESULTS DOC: HCPCS | Performed by: INTERNAL MEDICINE

## 2024-08-09 PROCEDURE — 1123F ACP DISCUSS/DSCN MKR DOCD: CPT | Performed by: INTERNAL MEDICINE

## 2024-08-09 PROCEDURE — 3074F SYST BP LT 130 MM HG: CPT | Performed by: INTERNAL MEDICINE

## 2024-08-09 PROCEDURE — 1090F PRES/ABSN URINE INCON ASSESS: CPT | Performed by: INTERNAL MEDICINE

## 2024-08-09 PROCEDURE — 93010 ELECTROCARDIOGRAM REPORT: CPT | Performed by: INTERNAL MEDICINE

## 2024-08-09 RX ORDER — TRIAMCINOLONE ACETONIDE 1 MG/G
OINTMENT TOPICAL
COMMUNITY
Start: 2023-03-03

## 2024-08-09 ASSESSMENT — PATIENT HEALTH QUESTIONNAIRE - PHQ9
SUM OF ALL RESPONSES TO PHQ9 QUESTIONS 1 & 2: 0
2. FEELING DOWN, DEPRESSED OR HOPELESS: NOT AT ALL
SUM OF ALL RESPONSES TO PHQ QUESTIONS 1-9: 0
SUM OF ALL RESPONSES TO PHQ QUESTIONS 1-9: 0
1. LITTLE INTEREST OR PLEASURE IN DOING THINGS: NOT AT ALL
SUM OF ALL RESPONSES TO PHQ QUESTIONS 1-9: 0
SUM OF ALL RESPONSES TO PHQ QUESTIONS 1-9: 0

## 2024-08-09 NOTE — PROGRESS NOTES
,                Patient: Demetra Andersen  : 1945    Primary Cardiologist: Marce Niño MD  EP Cardiologist: Dr. Guru Cm  PCP: Rebeca Mendoza MD    Today's Date: 2024    Fatigued, tries easily.  Moving difficult, less. Seems sleepy today.       ASSESSMENT AND PLAN:     Assessment and Plan:  CP/elevated troponin  Cath 2023 Dr. Foley no obstructive CAD  Would advocate no more caths  Imdur + spironolactone  Stop ranexa  Likely related to PE this last time   Echo 2024:   Left Ventricle: Normal left ventricular systolic function with a visually estimated EF of 55 - 60%. Left ventricle size is normal. Mildly increased wall thickness. Normal wall motion. Grade I diastolic dysfunction with normal LAP.    Mitral Valve: Mild regurgitation.    Tricuspid Valve: Mild regurgitation. Mildly elevated RVSP, consistent with mild pulmonary hypertension.    Aorta: Normal sized aortic root. Mildly dilated ascending aorta. Ao ascending diameter is 4.1 cm.    2. 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 HLD  Atorva 40  LDL 32    4.  PE 2023  Dr. Sampson Brown - expensive    5.  LE edema  LE doppler 2024:   No evidence of right lower extremity vein thrombosis.  There is limited visualization of the posterior tibial and peroneal veins.    There is reflux in the right popliteal vein.    6.  Dilated ascending aorta  Echo 2024 Asc Aorta 4.1cm  Follow by echo.          ICD-10-CM    1. Atrial fibrillation, unspecified type (HCC)  I48.91       2. Mixed hyperlipidemia  E78.2       3. HTN (hypertension), benign  I10       4. Edema, unspecified type  R60.9       5. HERNANDEZ (dyspnea on exertion)  R06.09             HISTORY OF PRESENT ILLNESS:     History of Present Illness:  Demetra Andersen is a 79 y.o. female who presents for FU.       Seen at my prior practice - admitted [facility] 2019 - NSTEMI - echo normal LV fxn, mild LVH.  Cath no obstructive CAD.  Placed on medical therapy.     In the

## 2024-09-08 DIAGNOSIS — I87.2 CHRONIC VENOUS STASIS DERMATITIS OF BOTH LOWER EXTREMITIES: ICD-10-CM

## 2024-09-08 DIAGNOSIS — L85.3 DRY SKIN DERMATITIS: ICD-10-CM

## 2024-09-08 RX ORDER — AMMONIUM LACTATE 12 G/100G
LOTION TOPICAL
Qty: 225 G | Status: SHIPPED | OUTPATIENT
Start: 2024-09-08

## 2024-10-13 DIAGNOSIS — I87.2 VENOUS INSUFFICIENCY OF BOTH LOWER EXTREMITIES: ICD-10-CM

## 2024-10-13 DIAGNOSIS — I10 BENIGN ESSENTIAL HYPERTENSION: Primary | ICD-10-CM

## 2024-10-13 RX ORDER — SPIRONOLACTONE 50 MG/1
50 TABLET, FILM COATED ORAL DAILY
Qty: 90 TABLET | Refills: 0 | Status: SHIPPED | OUTPATIENT
Start: 2024-10-13

## 2024-11-14 ENCOUNTER — TELEPHONE (OUTPATIENT)
Age: 79
End: 2024-11-14

## 2024-11-14 NOTE — TELEPHONE ENCOUNTER
Pt called asking if pt can get seen pt states pt fell hit head and left wrist is in pain and swollen pt when to a urgent care. Urgent care told pt to follow up pt would like to see PCP some time today. Pt is also asking for a referral for pt left wrist. Pt would like a call back from nurse on what to do.        BCBN [phone]

## 2024-11-15 NOTE — TELEPHONE ENCOUNTER
Call and spoke to patient, two ID confirmed.   Patient stated her wrist is still swelling. Writer inform her to go to Orthro on call and also schedule an appointment with my PCP.

## 2024-11-19 ENCOUNTER — OFFICE VISIT (OUTPATIENT)
Age: 79
End: 2024-11-19
Payer: MEDICARE

## 2024-11-19 VITALS
RESPIRATION RATE: 16 BRPM | WEIGHT: 220.6 LBS | OXYGEN SATURATION: 97 % | DIASTOLIC BLOOD PRESSURE: 84 MMHG | HEART RATE: 84 BPM | SYSTOLIC BLOOD PRESSURE: 132 MMHG | HEIGHT: 65 IN | TEMPERATURE: 97.5 F | BODY MASS INDEX: 36.75 KG/M2

## 2024-11-19 DIAGNOSIS — S00.03XA TRAUMATIC INJURY OF HEAD WITH HEMATOMA OF SCALP: ICD-10-CM

## 2024-11-19 DIAGNOSIS — Z09 HOSPITAL DISCHARGE FOLLOW-UP: Primary | ICD-10-CM
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
## 2024-11-19 DIAGNOSIS — S09.90XA TRAUMATIC INJURY OF HEAD WITH HEMATOMA OF SCALP: ICD-10-CM

## 2024-11-19 PROCEDURE — 99214 OFFICE O/P EST MOD 30 MIN: CPT | Performed by: FAMILY MEDICINE

## 2024-11-19 SDOH — ECONOMIC STABILITY: INCOME INSECURITY: HOW HARD IS IT FOR YOU TO PAY FOR THE VERY BASICS LIKE FOOD, HOUSING, MEDICAL CARE, AND HEATING?: NOT HARD AT ALL

## 2024-11-19 SDOH — ECONOMIC STABILITY: FOOD INSECURITY: WITHIN THE PAST 12 MONTHS, YOU WORRIED THAT YOUR FOOD WOULD RUN OUT BEFORE YOU GOT MONEY TO BUY MORE.: NEVER TRUE

## 2024-11-19 SDOH — ECONOMIC STABILITY: FOOD INSECURITY: WITHIN THE PAST 12 MONTHS, THE FOOD YOU BOUGHT JUST DIDN'T LAST AND YOU DIDN'T HAVE MONEY TO GET MORE.: NEVER TRUE

## 2024-11-19 ASSESSMENT — ENCOUNTER SYMPTOMS
NAUSEA: 0
RESPIRATORY NEGATIVE: 1
VOMITING: 0
GASTROINTESTINAL NEGATIVE: 1

## 2024-11-19 NOTE — PROGRESS NOTES
Chief Complaint   Patient presents with    follow up ER     After fall on 11/8/24     1. \"Have you been to the ER, urgent care clinic since your last visit?  Hospitalized since your last visit?\"  [facility] ER on 11/8/24    2. \"Have you seen or consulted any other health care providers outside of the [facility] since your last visit?\"  Ortho yesterday    3. For patients aged 45-75: Has the patient had a colonoscopy / FIT/ Cologuard? NA - based on age      If the patient is female:    4. For patients aged 40-74: Has the patient had a mammogram within the past 2 years? Yes - no Care Gap present 1/10/24      5. For patients aged 21-65: Has the patient had a pap smear? NA - based on age or sex      
      Social History     Substance and Sexual Activity   Sexual Activity Defer    Partners: Male    Birth control/protection: Post-menopausal       IMMUNIZATIONS     Immunization History   Administered Date(s) Administered    COVID-19, PFIZER PURPLE top, DILUTE for use, (age 12 y+), 30mcg/0.3mL 2021, 2021, 2021, 04/15/2022    COVID-19, PFIZER, (age 12y+), IM, 30mcg/0.3mL 10/11/2023    Influenza, FLUAD, (age 65 y+), IM, Quadv, 0.5mL 2021    Influenza, FLUZONE High Dose (age 65 y+), IM, Quadv, 0.7mL 2020, 10/11/2023    Influenza, FLUZONE High Dose, (age 65 y+), IM, Trivalent PF, 0.5mL 2015, 10/02/2017, 10/05/2018, 10/15/2022    Pneumococcal, PCV-13, PREVNAR 13, (age 6w+), IM, 0.5mL 2018    Pneumococcal, PPSV23, PNEUMOVAX 23, (age 2y+), SC/IM, 0.5mL 2010    TDaP, ADACEL (age 10y-64y), BOOSTRIX (age 10y+), IM, 0.5mL 2020, 2024    Zoster Live (Zostavax) 10/12/2012         FAMILY HISTORY     Family History   Problem Relation Age of Onset    Heart Disease Maternal Grandmother          at 102    Heart Disease Paternal Grandmother          at 93    Rheum Arthritis Mother         juvenile    Breast Cancer Mother 91    Other Mother         Diverticulitis    Hypertension Father     Diabetes Father          age 84 kidney failure; dialysis pt    Diabetes Sister     Cancer Brother          age 51 \"bone cancer\"    Alcohol Abuse Brother          of cirrhosis age 62    Osteoarthritis Mother         B TKR    Hypertension Mother     Anesth Problems Neg Hx     Heart Attack Paternal Grandfather          in his 70's         VITALS   /84 (Site: Left Upper Arm, Position: Sitting, Cuff Size: Large Adult)   Pulse 84   Temp 97.5 °F (36.4 °C) (Oral)   Resp 16   Ht 1.651 m (5' 5\")   Wt 100.1 kg (220 lb 9.6 oz)   SpO2 97%   BMI 36.71 kg/m²     Vitals:    24 1121   BP: 132/84   Site: Left Upper Arm   Position: Sitting   Cuff Size: Large Adult

## 2024-12-18 ENCOUNTER — TRANSCRIBE ORDERS (OUTPATIENT)
Facility: HOSPITAL | Age: 79
End: 2024-12-18

## 2024-12-18 DIAGNOSIS — Z12.31 ENCOUNTER FOR SCREENING MAMMOGRAM FOR MALIGNANT NEOPLASM OF BREAST: Primary | ICD-10-CM

## 2024-12-24 ENCOUNTER — TELEPHONE (OUTPATIENT)
Age: 79
End: 2024-12-24

## 2024-12-24 NOTE — TELEPHONE ENCOUNTER
Adelita hurtado spoke with luis states that they are need to know pt last visit and if pt was treated for heart disease or diabetes.    BCBN [phone]

## 2024-12-26 DIAGNOSIS — I10 BENIGN ESSENTIAL HYPERTENSION: ICD-10-CM

## 2024-12-26 DIAGNOSIS — I87.2 VENOUS INSUFFICIENCY OF BOTH LOWER EXTREMITIES: ICD-10-CM

## 2024-12-26 NOTE — TELEPHONE ENCOUNTER
PCP: Rebeca Mendoza MD      Future Appointments   Date Time Provider Department Center   1/14/2025 10:30 AM [facility] 6 [facility]   1/21/2025 10:00 AM Rebeca Mendoza MD [facility]   2/14/2025 11:20 AM Marce Niño MD [facility]       Requested Prescriptions     Pending Prescriptions Disp Refills    spironolactone (ALDACTONE) 50 MG tablet [Pharmacy Med Name: Spironolactone Oral Tablet 50 MG] 90 tablet 3     Sig: TAKE 1 TABLET EVERY DAY       Prior labs and Blood pressures:  BP Readings from Last 3 Encounters:   11/19/24 132/84   08/09/24 120/72   07/16/24 122/68     Lab Results   Component Value Date/Time     07/16/2024 10:49 AM    K 4.7 07/16/2024 10:49 AM     07/16/2024 10:49 AM    CO2 30 07/16/2024 10:49 AM    BUN 19 07/16/2024 10:49 AM    GFRAA 59 11/11/2021 10:52 AM     Lab Results   Component Value Date/Time    NOB8GKNU 6.0 06/23/2022 10:43 AM     Lab Results   Component Value Date/Time    CHOL 127 06/19/2024 12:27 PM    HDL 53 06/19/2024 12:27 PM    LDL 60.2 06/19/2024 12:27 PM    LDL 32.2 11/14/2023 10:38 AM    VLDL 13.8 06/19/2024 12:27 PM    VLDL 21 09/14/2020 10:46 AM     No results found for: \"VITD3\"    Lab Results   Component Value Date/Time    TSH 1.29 06/19/2024 12:27 PM

## 2024-12-27 RX ORDER — SPIRONOLACTONE 50 MG/1
50 TABLET, FILM COATED ORAL DAILY
Qty: 90 TABLET | Refills: 1 | Status: SHIPPED | OUTPATIENT
Start: 2024-12-27

## 2024-12-30 NOTE — TELEPHONE ENCOUNTER
I called the # that was provided.  The person that answered the phone just said this is Gloria.  She didn't answer the phone as [employer].  Gloria wanted some information if pt had been seen or treated for heart disease.  I asked her to send me a release request prior to answering questions

## 2025-01-14 ENCOUNTER — HOSPITAL ENCOUNTER (OUTPATIENT)
Facility: HOSPITAL | Age: 80
Discharge: HOME | End: 2025-01-17
Attending: FAMILY MEDICINE
Payer: MEDICARE

## 2025-01-14 VITALS — WEIGHT: 210 LBS | HEIGHT: 65 IN | BODY MASS INDEX: 34.99 KG/M2

## 2025-01-14 DIAGNOSIS — Z12.31 ENCOUNTER FOR SCREENING MAMMOGRAM FOR MALIGNANT NEOPLASM OF BREAST: ICD-10-CM

## 2025-01-14 PROCEDURE — 77063 BREAST TOMOSYNTHESIS BI: CPT

## 2025-01-21 ENCOUNTER — OFFICE VISIT (OUTPATIENT)
Age: 80
End: 2025-01-21
Payer: MEDICARE

## 2025-01-21 VITALS
SYSTOLIC BLOOD PRESSURE: 113 MMHG | OXYGEN SATURATION: 99 % | RESPIRATION RATE: 16 BRPM | TEMPERATURE: 96.9 F | HEART RATE: 72 BPM | WEIGHT: 223.8 LBS | DIASTOLIC BLOOD PRESSURE: 74 MMHG | BODY MASS INDEX: 37.29 KG/M2 | HEIGHT: 65 IN

## 2025-01-21 DIAGNOSIS — N18.31 TYPE 2 DIABETES MELLITUS WITH STAGE 3A CHRONIC KIDNEY DISEASE, WITHOUT LONG-TERM CURRENT USE OF INSULIN (HCC): Primary | ICD-10-CM

## 2025-01-21 DIAGNOSIS — Z86.79 HISTORY OF ATRIAL FIBRILLATION: ICD-10-CM

## 2025-01-21 DIAGNOSIS — Z86.711 HISTORY OF PULMONARY EMBOLISM: ICD-10-CM

## 2025-01-21 DIAGNOSIS — E78.2 MIXED HYPERLIPIDEMIA: ICD-10-CM

## 2025-01-21 DIAGNOSIS — I25.2 HISTORY OF NON-ST ELEVATION MYOCARDIAL INFARCTION (NSTEMI): ICD-10-CM

## 2025-01-21 DIAGNOSIS — I10 BENIGN ESSENTIAL HYPERTENSION: ICD-10-CM

## 2025-01-21 DIAGNOSIS — D72.829 LEUKOCYTOSIS, UNSPECIFIED TYPE: ICD-10-CM

## 2025-01-21 DIAGNOSIS — E11.22 TYPE 2 DIABETES MELLITUS WITH STAGE 3A CHRONIC KIDNEY DISEASE, WITHOUT LONG-TERM CURRENT USE OF INSULIN (HCC): Primary | ICD-10-CM

## 2025-01-21 LAB
ANION GAP SERPL CALC-SCNC: 6 MMOL/L (ref 2–12)
BASOPHILS # BLD: 0.02 K/UL (ref 0–0.1)
BASOPHILS NFR BLD: 0.2 % (ref 0–1)
BUN SERPL-MCNC: 14 MG/DL (ref 6–20)
BUN/CREAT SERPL: 11 (ref 12–20)
CALCIUM SERPL-MCNC: 9.9 MG/DL (ref 8.5–10.1)
CHLORIDE SERPL-SCNC: 105 MMOL/L (ref 97–108)
CO2 SERPL-SCNC: 28 MMOL/L (ref 21–32)
CREAT SERPL-MCNC: 1.27 MG/DL (ref 0.55–1.02)
CREAT UR-MCNC: 147 MG/DL
DIFFERENTIAL METHOD BLD: ABNORMAL
EOSINOPHIL # BLD: 0.13 K/UL (ref 0–0.4)
EOSINOPHIL NFR BLD: 1.1 % (ref 0–7)
ERYTHROCYTE [DISTWIDTH] IN BLOOD BY AUTOMATED COUNT: 14.2 % (ref 11.5–14.5)
EST. AVERAGE GLUCOSE BLD GHB EST-MCNC: 140 MG/DL
GLUCOSE SERPL-MCNC: 121 MG/DL (ref 65–100)
HBA1C MFR BLD: 6.5 % (ref 4–5.6)
HCT VFR BLD AUTO: 40.2 % (ref 35–47)
HGB BLD-MCNC: 13.2 G/DL (ref 11.5–16)
IMM GRANULOCYTES # BLD AUTO: 0.06 K/UL (ref 0–0.04)
IMM GRANULOCYTES NFR BLD AUTO: 0.5 % (ref 0–0.5)
LYMPHOCYTES # BLD: 2.34 K/UL (ref 0.8–3.5)
LYMPHOCYTES NFR BLD: 19.7 % (ref 12–49)
MCH RBC QN AUTO: 31.1 PG (ref 26–34)
MCHC RBC AUTO-ENTMCNC: 32.8 G/DL (ref 30–36.5)
MCV RBC AUTO: 94.8 FL (ref 80–99)
MICROALBUMIN UR-MCNC: 6.12 MG/DL
MICROALBUMIN/CREAT UR-RTO: 42 MG/G (ref 0–30)
MONOCYTES # BLD: 1.14 K/UL (ref 0–1)
MONOCYTES NFR BLD: 9.6 % (ref 5–13)
NEUTS SEG # BLD: 8.2 K/UL (ref 1.8–8)
NEUTS SEG NFR BLD: 68.9 % (ref 32–75)
NRBC # BLD: 0 K/UL (ref 0–0.01)
NRBC BLD-RTO: 0 PER 100 WBC
PLATELET # BLD AUTO: 281 K/UL (ref 150–400)
PMV BLD AUTO: 10 FL (ref 8.9–12.9)
POTASSIUM SERPL-SCNC: 4.3 MMOL/L (ref 3.5–5.1)
RBC # BLD AUTO: 4.24 M/UL (ref 3.8–5.2)
SODIUM SERPL-SCNC: 139 MMOL/L (ref 136–145)
WBC # BLD AUTO: 11.9 K/UL (ref 3.6–11)

## 2025-01-21 PROCEDURE — 99214 OFFICE O/P EST MOD 30 MIN: CPT | Performed by: FAMILY MEDICINE

## 2025-01-21 SDOH — ECONOMIC STABILITY: FOOD INSECURITY: WITHIN THE PAST 12 MONTHS, YOU WORRIED THAT YOUR FOOD WOULD RUN OUT BEFORE YOU GOT MONEY TO BUY MORE.: NEVER TRUE

## 2025-01-21 SDOH — ECONOMIC STABILITY: FOOD INSECURITY: WITHIN THE PAST 12 MONTHS, THE FOOD YOU BOUGHT JUST DIDN'T LAST AND YOU DIDN'T HAVE MONEY TO GET MORE.: NEVER TRUE

## 2025-01-21 ASSESSMENT — ENCOUNTER SYMPTOMS
GASTROINTESTINAL NEGATIVE: 1
RESPIRATORY NEGATIVE: 1

## 2025-01-21 NOTE — PROGRESS NOTES
Chief Complaint   Patient presents with    6 Month Follow-Up    Diabetes    Hypertension    Cholesterol Problem     HISTORY OF PRESENT ILLNESS   Patient with history of Type 2 NIDDM, Stage 3a CKD, Mixed Hyperlipidemia, Benign Essential Hypertension, Atrial Fibrillation s/p Ablation, NSTEMI, Chronic Venous Insufficiency of BLE, Pulmonary Embolism, OA and Obesity presents for 6 month routine follow up, medication check and follow up labs. Complying routinely w/ medication regimen and tolerating w/o reaction or side effects. Preferentially does not check BS's. No signs/symptoms of hyper or hypoglycemia.   Diet: nothing special  Caffeine: 1 bottle of 16-20 oz of Pepsi a day;1 glass of sweet tea 3 x a week, 1 cup of coffee 2-3 x a month, occ hot tea or green tea  Exercise: none      Weight: staying in the 220's-230's over time     REVIEW OF SYMPTOMS   Review of Systems   Constitutional: Negative.    Respiratory: Negative.     Cardiovascular: Negative.    Gastrointestinal: Negative.    Endocrine: Negative.    Genitourinary: Negative.    Neurological: Negative.              PROBLEM LIST/MEDICAL HISTORY     Patient Active Problem List    Diagnosis Date Noted    AVNRT (AV nhung re-entry tachycardia) ([facility]) 11/20/2023    History of pulmonary embolism 08/24/2023     Overview Note:     CJW 8/3/23-8/7/23; + VQ Scan; started on Eliquis 5 mg, bid; Pulmonary Dr. Lancaster      Severe obesity (BMI 35.0-39.9) with comorbidity 05/16/2023    Type 2 diabetes mellitus with stage 3a chronic kidney disease, without long-term current use of insulin (HCC) 11/11/2021     Overview Note:     Diagnosed Type 2 NIDDM 5/2012, HgbA1c 6.5; CKD 2012; Dr Guerin et al; Renal US 2/2013; follow up prn; Eye doctor: [facility]         History of supraventricular tachycardia 03/24/2021     Overview Note:     [facility] 3/3/21-3/6/21 S/p cardioversion w/ IV Adenosine. Stable on increased regimen of Toprol. EP Cardiology follow up

## 2025-01-21 NOTE — PROGRESS NOTES
Chief Complaint   Patient presents with    Hypertension    Cholesterol Problem     \"Have you been to the ER, urgent care clinic since your last visit?  Hospitalized since your last visit?\"    NO    “Have you seen or consulted any other health care providers outside of [facility] since your last visit?”    NO                   1/21/2025    10:16 AM   PHQ-9    Little interest or pleasure in doing things 0   Feeling down, depressed, or hopeless 0   PHQ-2 Score 0   PHQ-9 Total Score 0           Financial Resource Strain: Low Risk  (12/11/2024)    Overall Financial Resource Strain (CARDIA)     Difficulty of Paying Living Expenses: Not hard at all      Food Insecurity: No Food Insecurity (1/21/2025)    Hunger Vital Sign     Worried About Running Out of Food in the Last Year: Never true     Ran Out of Food in the Last Year: Never true          Health Maintenance Due   Topic Date Due    DEXA (modify frequency per FRAX score)  Never done    Shingles vaccine (2 of 3) 12/07/2012    Respiratory Syncytial Virus (RSV) Pregnant or age 60 yrs+ (1 - 1-dose 75+ series) Never done    Diabetic retinal exam  01/23/2020    Diabetic Alb to Cr ratio (uACR) test  11/14/2023    Flu vaccine (1) 08/01/2024    COVID-19 Vaccine (6 - 2023-24 season) 09/01/2024    Diabetic foot exam  11/14/2024    A1C test (Diabetic or Prediabetic)  12/19/2024

## 2025-01-21 NOTE — PATIENT INSTRUCTIONS
[facility] Utility - Financial Resources*  (Call United Way/211 if need more resources.)  Utilities  Can[employer]  What they offer: Partnership with the [facility] of . Assist with finding and applying for government funded programs and benefits. You can also update your benefits or report changes through Can[employer].  Website: https://www.GT Channel/  Phone Number: 8335CALLVA ([phone])    [employer]  What they offer: EnergyShare is [facility]'s energy assistance program of last resort for anyone who faces financial hardships from unemployment or family crisis.  Phone Number: [phone]  Address: [address] [place] [address]  Website: https://www.ZipRecruiter/[place]illing/billing-options/energyshare    Energy Assistance Programs (EAP) - [facility] of   What they offer: EAP assists low-income households in meeting their immediate home energy needs.      Website: https://www.IdeaSquares.[place].gov/benefit/ea/  Available assistance:   Crisis Assistance - Heating Emergencies  Fuel Assistance - Offset Heating Fuel Costs  Cooling Assistance - Applies to Cooling Utility Bills and Equipment  How to apply:  Online:  https://Zakaz.uavirginiag-Nostics/  Call:  [phone]   Paper application:   Print application from https://www.IdeaSquares.[place].gov/benefit/ea/ and submit to your [facility] Department of     [facility] Department of   [facility] of  contacts: https://www.[facility].[place].[facility]/localagency/index.cgi  [place] Utility Affordability Programs  https://Proxamaa.gov/public-utilities/billing  Call:  [phone]   Email:  onofre@a.gov  Programs available:  Equal Monthly Payment Plan, [employer] Heat Program, [employer] Water Program, [employer] Water Conservation Program, Senior Assistance, Other Fuel Assistance Programs, PromisePay Payment Plans, Low-Income Household Water

## 2025-01-25 ENCOUNTER — TELEPHONE (OUTPATIENT)
Age: 80
End: 2025-01-25

## 2025-01-25 NOTE — TELEPHONE ENCOUNTER
Please call patient and advise:    Her white blood count is mildly elevated again which may be due to the steroid injection she has been getting from orthopedics.  Will continue to monitor this.    Her kidney function remained stable in her baseline range over time and has improved slightly from last check.  Make sure she is getting in enough water each day and staying well-hydrated.  Avoid taking any NSAIDs.  Tylenol is the safer alternative.    Her A1c 3-month sugar average has gone up again from last check.  It has been gradually increasing over the past year and is her highest reading over the last several years.  She needs to watch her intake of sugars and carbohydrates.  Work on weight reduction.    Follow-up with me in 3 months to keep a closer watch and reassess all the above.  She should also keep the appointment she already has scheduled with me for 7/21/2025.  That is booked as a 6-month follow-up but it needs to be re-booked as a Medicare annual wellness visit and for 40 minutes.

## 2025-01-27 NOTE — TELEPHONE ENCOUNTER
Call and spoke to patient, two ID confirmed.   Writer informed patient of lab results and recommendation. Also appointments scheduled per MD.

## 2025-02-10 ENCOUNTER — TELEPHONE (OUTPATIENT)
Age: 80
End: 2025-02-10

## 2025-02-10 NOTE — TELEPHONE ENCOUNTER
Spoke to pt and she said that she got a letter from [facility] ([employer]), stating that she didn't qualify for this insurance she doesn't have a chronic condition.  It notes that her policy will end on 2/28.  I instructed her to contact insurance to see if there was a mistake.  Pt was seen on 1/21/25 for high chol, DM and HTN.  All of documented in the chart and coded.  Advised that if they need anything from us they can fax a request and I will be glad to send them anything they need.  I gave her my direct fax #

## 2025-02-10 NOTE — TELEPHONE ENCOUNTER
Pt called asking to speak with nurse or Dr. Hopkins about insurance and other matters.    BCBN [phone]

## 2025-02-18 DIAGNOSIS — L85.3 DRY SKIN DERMATITIS: ICD-10-CM

## 2025-02-18 DIAGNOSIS — I87.2 CHRONIC VENOUS STASIS DERMATITIS OF BOTH LOWER EXTREMITIES: ICD-10-CM

## 2025-02-18 DIAGNOSIS — I10 BENIGN ESSENTIAL HYPERTENSION: ICD-10-CM

## 2025-02-18 DIAGNOSIS — I87.2 VENOUS INSUFFICIENCY OF BOTH LOWER EXTREMITIES: ICD-10-CM

## 2025-02-18 RX ORDER — SPIRONOLACTONE 50 MG/1
50 TABLET, FILM COATED ORAL DAILY
Qty: 90 TABLET | Refills: 1 | Status: SHIPPED | OUTPATIENT
Start: 2025-02-18

## 2025-02-18 RX ORDER — METOPROLOL SUCCINATE 25 MG/1
25 TABLET, EXTENDED RELEASE ORAL DAILY
Qty: 90 TABLET | Refills: 1 | Status: SHIPPED | OUTPATIENT
Start: 2025-02-18

## 2025-02-18 RX ORDER — AMMONIUM LACTATE 12 G/100G
LOTION TOPICAL
Qty: 225 G | Status: SHIPPED | OUTPATIENT
Start: 2025-02-18

## 2025-02-18 NOTE — TELEPHONE ENCOUNTER
MD ALMANZA,    Patient call and asking for ALL of her medications to be sent to [facility].  (Change in pharmacy)    Only show spironolactone, metoprolol, and Lac-hydrin lotion from this provider.   Thanks, Billie    Last appointment: 1/21/25 MD ALMANZA, labs 1/2025  Next appointment: 4/23/25 MD ALMANZA  Previous refill encounter(s):   Lac-hydrin 9/8/24 225g + prn  Metoprolol 7/7/24 90 + 2  Spironolactone 12/27/24 90 + 1    Requested Prescriptions     Pending Prescriptions Disp Refills    ammonium lactate (LAC-HYDRIN) 12 % lotion 225 g PRN     Sig: Apply to dry skin areas on legs once to twice daily as needed    metoprolol succinate (TOPROL XL) 25 MG extended release tablet 90 tablet 1     Sig: Take 1 tablet by mouth daily For Hypertension    spironolactone (ALDACTONE) 50 MG tablet 90 tablet 1     Sig: Take 1 tablet by mouth daily     For Pharmacy Admin Tracking Only    Program: Medication Refill  CPA in place:    Recommendation Provided To:   Intervention Detail: New Rx: 3, reason: Patient Preference  Intervention Accepted By:   Gap Closed?:    Time Spent (min): 10

## 2025-02-28 ENCOUNTER — OFFICE VISIT (OUTPATIENT)
Age: 80
End: 2025-02-28
Payer: MEDICARE

## 2025-02-28 VITALS
BODY MASS INDEX: 37.29 KG/M2 | HEART RATE: 79 BPM | OXYGEN SATURATION: 98 % | HEIGHT: 65 IN | WEIGHT: 223.8 LBS | DIASTOLIC BLOOD PRESSURE: 80 MMHG | SYSTOLIC BLOOD PRESSURE: 150 MMHG

## 2025-02-28 DIAGNOSIS — I48.91 ATRIAL FIBRILLATION, UNSPECIFIED TYPE (HCC): ICD-10-CM

## 2025-02-28 DIAGNOSIS — I10 HTN (HYPERTENSION), BENIGN: ICD-10-CM

## 2025-02-28 DIAGNOSIS — N18.31 TYPE 2 DIABETES MELLITUS WITH STAGE 3A CHRONIC KIDNEY DISEASE, WITHOUT LONG-TERM CURRENT USE OF INSULIN (HCC): ICD-10-CM

## 2025-02-28 DIAGNOSIS — E11.22 TYPE 2 DIABETES MELLITUS WITH STAGE 3A CHRONIC KIDNEY DISEASE, WITHOUT LONG-TERM CURRENT USE OF INSULIN (HCC): ICD-10-CM

## 2025-02-28 DIAGNOSIS — R60.9 EDEMA, UNSPECIFIED TYPE: ICD-10-CM

## 2025-02-28 DIAGNOSIS — I27.20 PULMONARY HTN (HCC): ICD-10-CM

## 2025-02-28 DIAGNOSIS — R06.09 DOE (DYSPNEA ON EXERTION): ICD-10-CM

## 2025-02-28 DIAGNOSIS — M06.9 RHEUMATOID ARTHRITIS, INVOLVING UNSPECIFIED SITE, UNSPECIFIED WHETHER RHEUMATOID FACTOR PRESENT (HCC): Primary | ICD-10-CM

## 2025-02-28 DIAGNOSIS — E78.2 MIXED HYPERLIPIDEMIA: ICD-10-CM

## 2025-02-28 PROCEDURE — 3079F DIAST BP 80-89 MM HG: CPT | Performed by: INTERNAL MEDICINE

## 2025-02-28 PROCEDURE — 1123F ACP DISCUSS/DSCN MKR DOCD: CPT | Performed by: INTERNAL MEDICINE

## 2025-02-28 PROCEDURE — 3077F SYST BP >= 140 MM HG: CPT | Performed by: INTERNAL MEDICINE

## 2025-02-28 PROCEDURE — 1126F AMNT PAIN NOTED NONE PRSNT: CPT | Performed by: INTERNAL MEDICINE

## 2025-02-28 PROCEDURE — 1159F MED LIST DOCD IN RCRD: CPT | Performed by: INTERNAL MEDICINE

## 2025-02-28 PROCEDURE — 99214 OFFICE O/P EST MOD 30 MIN: CPT | Performed by: INTERNAL MEDICINE

## 2025-02-28 PROCEDURE — 3044F HG A1C LEVEL LT 7.0%: CPT | Performed by: INTERNAL MEDICINE

## 2025-02-28 RX ORDER — FUROSEMIDE 20 MG/1
20 TABLET ORAL DAILY
Qty: 90 TABLET | Refills: 3 | Status: SHIPPED | OUTPATIENT
Start: 2025-02-28

## 2025-02-28 ASSESSMENT — PATIENT HEALTH QUESTIONNAIRE - PHQ9
1. LITTLE INTEREST OR PLEASURE IN DOING THINGS: NOT AT ALL
2. FEELING DOWN, DEPRESSED OR HOPELESS: NOT AT ALL
SUM OF ALL RESPONSES TO PHQ QUESTIONS 1-9: 0
SUM OF ALL RESPONSES TO PHQ QUESTIONS 1-9: 0
SUM OF ALL RESPONSES TO PHQ9 QUESTIONS 1 & 2: 0
SUM OF ALL RESPONSES TO PHQ QUESTIONS 1-9: 0
SUM OF ALL RESPONSES TO PHQ QUESTIONS 1-9: 0

## 2025-02-28 NOTE — PROGRESS NOTES
Per Dr. Renay serrano, refer to Rheumatology, echo/fup 1 yr  
ARTHROPLASTY Right 06/2010    Right TKR; Dr Branden Villalta    TOTAL KNEE ARTHROPLASTY Left 02/23/2023    Dr. Carroll [facility] BREAST FINE NEEDLE ASPIRATION Right     Hx of Cyst (Over 30yrs ago)       CURRENT MEDICATIONS:    .  Current Outpatient Medications   Medication Sig Dispense Refill    ammonium lactate (LAC-HYDRIN) 12 % lotion Apply to dry skin areas on legs once to twice daily as needed 225 g PRN    metoprolol succinate (TOPROL XL) 25 MG extended release tablet Take 1 tablet by mouth daily For Hypertension 90 tablet 1    spironolactone (ALDACTONE) 50 MG tablet Take 1 tablet by mouth daily 90 tablet 1    Coenzyme Q10 (CO Q-10 MAXIMUM STRENGTH PO) Take 100 mg by mouth      Ascorbic Acid (VITAMIN C) 250 MG tablet Take 1 tablet by mouth daily      furosemide (LASIX) 20 MG tablet Take 1 tablet by mouth daily Per cardiologist 90 tablet 3    apixaban (ELIQUIS) 5 MG TABS tablet Take 1 tablet by mouth 2 times daily      atorvastatin (LIPITOR) 40 MG tablet Take 1 tablet by mouth daily      omega-3 acid ethyl esters (LOVAZA) 1 g capsule Take 1 capsule by mouth daily      TURMERIC PO Take 1,000 mg by mouth daily      acetaminophen (TYLENOL) 500 MG tablet Take 650 mg by mouth daily      vitamin D (CHOLECALCIFEROL) 25 MCG (1000 UT) TABS tablet Take 1 tablet by mouth daily      cyanocobalamin 1000 MCG tablet Take 2 tablets by mouth daily      diclofenac sodium (VOLTAREN) 1 % GEL Apply 2 g topically 4 times daily as needed      isosorbide mononitrate (IMDUR) 30 MG extended release tablet Take 1 tablet by mouth daily       No current facility-administered medications for this visit.       Allergies   Allergen Reactions    Ace Inhibitors Other (See Comments)     Renal insufficiency    Amlodipine Swelling     Increased leg swelling    Hydrochlorothiazide Other (See Comments)     Stopped d/t renal insufficiency, followed by Nephrologist    Tramadol Nausea And Vomiting       SOCIAL HISTORY:     Social History     Tobacco Use

## 2025-04-23 ENCOUNTER — OFFICE VISIT (OUTPATIENT)
Age: 80
End: 2025-04-23
Payer: MEDICARE

## 2025-04-23 VITALS
SYSTOLIC BLOOD PRESSURE: 122 MMHG | OXYGEN SATURATION: 95 % | BODY MASS INDEX: 38.39 KG/M2 | TEMPERATURE: 96.8 F | WEIGHT: 230.4 LBS | HEART RATE: 67 BPM | DIASTOLIC BLOOD PRESSURE: 86 MMHG | HEIGHT: 65 IN | RESPIRATION RATE: 18 BRPM

## 2025-04-23 DIAGNOSIS — Z86.711 HISTORY OF PULMONARY EMBOLISM: ICD-10-CM

## 2025-04-23 DIAGNOSIS — Z98.890 S/P ABLATION OF ATRIAL FIBRILLATION: ICD-10-CM

## 2025-04-23 DIAGNOSIS — N18.31 TYPE 2 DIABETES MELLITUS WITH STAGE 3A CHRONIC KIDNEY DISEASE, WITHOUT LONG-TERM CURRENT USE OF INSULIN (HCC): Primary | ICD-10-CM

## 2025-04-23 DIAGNOSIS — D72.829 LEUKOCYTOSIS, UNSPECIFIED TYPE: ICD-10-CM

## 2025-04-23 DIAGNOSIS — I10 BENIGN ESSENTIAL HYPERTENSION: ICD-10-CM

## 2025-04-23 DIAGNOSIS — Z86.79 S/P ABLATION OF ATRIAL FIBRILLATION: ICD-10-CM

## 2025-04-23 DIAGNOSIS — I25.2 HISTORY OF NON-ST ELEVATION MYOCARDIAL INFARCTION (NSTEMI): ICD-10-CM

## 2025-04-23 DIAGNOSIS — E11.22 TYPE 2 DIABETES MELLITUS WITH STAGE 3A CHRONIC KIDNEY DISEASE, WITHOUT LONG-TERM CURRENT USE OF INSULIN (HCC): Primary | ICD-10-CM

## 2025-04-23 DIAGNOSIS — I87.2 VENOUS INSUFFICIENCY OF BOTH LOWER EXTREMITIES: ICD-10-CM

## 2025-04-23 DIAGNOSIS — E78.2 MIXED HYPERLIPIDEMIA: ICD-10-CM

## 2025-04-23 LAB
ALBUMIN SERPL-MCNC: 3.7 G/DL (ref 3.5–5)
ALBUMIN/GLOB SERPL: 1 (ref 1.1–2.2)
ALP SERPL-CCNC: 177 U/L (ref 45–117)
ALT SERPL-CCNC: 20 U/L (ref 12–78)
ANION GAP SERPL CALC-SCNC: 3 MMOL/L (ref 2–12)
AST SERPL-CCNC: 23 U/L (ref 15–37)
BASOPHILS # BLD: 0.03 K/UL (ref 0–0.1)
BASOPHILS NFR BLD: 0.3 % (ref 0–1)
BILIRUB SERPL-MCNC: 0.3 MG/DL (ref 0.2–1)
BUN SERPL-MCNC: 16 MG/DL (ref 6–20)
BUN/CREAT SERPL: 13 (ref 12–20)
CALCIUM SERPL-MCNC: 9.3 MG/DL (ref 8.5–10.1)
CHLORIDE SERPL-SCNC: 105 MMOL/L (ref 97–108)
CHOLEST SERPL-MCNC: 105 MG/DL
CO2 SERPL-SCNC: 32 MMOL/L (ref 21–32)
CREAT SERPL-MCNC: 1.25 MG/DL (ref 0.55–1.02)
DIFFERENTIAL METHOD BLD: NORMAL
EOSINOPHIL # BLD: 0.25 K/UL (ref 0–0.4)
EOSINOPHIL NFR BLD: 2.5 % (ref 0–7)
ERYTHROCYTE [DISTWIDTH] IN BLOOD BY AUTOMATED COUNT: 13.6 % (ref 11.5–14.5)
EST. AVERAGE GLUCOSE BLD GHB EST-MCNC: 157 MG/DL
GLOBULIN SER CALC-MCNC: 3.6 G/DL (ref 2–4)
GLUCOSE SERPL-MCNC: 114 MG/DL (ref 65–100)
HBA1C MFR BLD: 7.1 % (ref 4–5.6)
HCT VFR BLD AUTO: 38 % (ref 35–47)
HDLC SERPL-MCNC: 56 MG/DL
HDLC SERPL: 1.9 (ref 0–5)
HGB BLD-MCNC: 11.9 G/DL (ref 11.5–16)
IMM GRANULOCYTES # BLD AUTO: 0.04 K/UL (ref 0–0.04)
IMM GRANULOCYTES NFR BLD AUTO: 0.4 % (ref 0–0.5)
LDLC SERPL CALC-MCNC: 34.2 MG/DL (ref 0–100)
LYMPHOCYTES # BLD: 2.16 K/UL (ref 0.8–3.5)
LYMPHOCYTES NFR BLD: 21.8 % (ref 12–49)
MCH RBC QN AUTO: 29.9 PG (ref 26–34)
MCHC RBC AUTO-ENTMCNC: 31.3 G/DL (ref 30–36.5)
MCV RBC AUTO: 95.5 FL (ref 80–99)
MONOCYTES # BLD: 0.9 K/UL (ref 0–1)
MONOCYTES NFR BLD: 9.1 % (ref 5–13)
NEUTS SEG # BLD: 6.51 K/UL (ref 1.8–8)
NEUTS SEG NFR BLD: 65.9 % (ref 32–75)
NRBC # BLD: 0 K/UL (ref 0–0.01)
NRBC BLD-RTO: 0 PER 100 WBC
PLATELET # BLD AUTO: 297 K/UL (ref 150–400)
PMV BLD AUTO: 10.2 FL (ref 8.9–12.9)
POTASSIUM SERPL-SCNC: 4.1 MMOL/L (ref 3.5–5.1)
PROT SERPL-MCNC: 7.3 G/DL (ref 6.4–8.2)
RBC # BLD AUTO: 3.98 M/UL (ref 3.8–5.2)
SODIUM SERPL-SCNC: 140 MMOL/L (ref 136–145)
TRIGL SERPL-MCNC: 74 MG/DL
TSH SERPL DL<=0.05 MIU/L-ACNC: 1.45 UIU/ML (ref 0.36–3.74)
VLDLC SERPL CALC-MCNC: 14.8 MG/DL
WBC # BLD AUTO: 9.9 K/UL (ref 3.6–11)

## 2025-04-23 PROCEDURE — 1123F ACP DISCUSS/DSCN MKR DOCD: CPT | Performed by: FAMILY MEDICINE

## 2025-04-23 PROCEDURE — 1159F MED LIST DOCD IN RCRD: CPT | Performed by: FAMILY MEDICINE

## 2025-04-23 PROCEDURE — 99214 OFFICE O/P EST MOD 30 MIN: CPT | Performed by: FAMILY MEDICINE

## 2025-04-23 PROCEDURE — 3074F SYST BP LT 130 MM HG: CPT | Performed by: FAMILY MEDICINE

## 2025-04-23 PROCEDURE — 3079F DIAST BP 80-89 MM HG: CPT | Performed by: FAMILY MEDICINE

## 2025-04-23 PROCEDURE — 1125F AMNT PAIN NOTED PAIN PRSNT: CPT | Performed by: FAMILY MEDICINE

## 2025-04-23 PROCEDURE — G2211 COMPLEX E/M VISIT ADD ON: HCPCS | Performed by: FAMILY MEDICINE

## 2025-04-23 PROCEDURE — 3044F HG A1C LEVEL LT 7.0%: CPT | Performed by: FAMILY MEDICINE

## 2025-04-23 PROCEDURE — 1160F RVW MEDS BY RX/DR IN RCRD: CPT | Performed by: FAMILY MEDICINE

## 2025-04-23 ASSESSMENT — ENCOUNTER SYMPTOMS
COUGH: 0
WHEEZING: 0
SHORTNESS OF BREATH: 0

## 2025-04-23 NOTE — PROGRESS NOTES
Chief Complaint   Patient presents with    Cholesterol Problem    Hypertension     3 month         \"Have you been to the ER, urgent care clinic since your last visit?  Hospitalized since your last visit?\"    NO    “Have you seen or consulted any other health care providers outside of [facility] since your last visit?”    NO            Click Here for Release of Records Request           2/28/2025    11:11 AM   PHQ-9    Little interest or pleasure in doing things 0   Feeling down, depressed, or hopeless 0   PHQ-2 Score 0   PHQ-9 Total Score 0           Financial Resource Strain: Low Risk  (12/11/2024)    Overall Financial Resource Strain (CARDIA)     Difficulty of Paying Living Expenses: Not hard at all      Food Insecurity: No Food Insecurity (1/21/2025)    Hunger Vital Sign     Worried About Running Out of Food in the Last Year: Never true     Ran Out of Food in the Last Year: Never true          Health Maintenance Due   Topic Date Due    DEXA (modify frequency per FRAX score)  Never done    Shingles vaccine (2 of 3) 12/07/2012    Respiratory Syncytial Virus (RSV) Pregnant or age 60 yrs+ (1 - 1-dose 75+ series) Never done    Diabetic retinal exam  01/23/2020    COVID-19 Vaccine (6 - 2024-25 season) 09/01/2024    Diabetic foot exam  11/14/2024

## 2025-04-23 NOTE — PROGRESS NOTES
Chief Complaint   Patient presents with    3 Month Follow-Up     Fasting    Diabetes    Abnormal Lab    Cholesterol Problem    Hypertension     HISTORY OF PRESENT ILLNESS   Patient with history of Type 2 NIDDM, Stage 3a CKD, Mixed Hyperlipidemia, Benign Essential Hypertension, Atrial Fibrillation s/p Ablation, NSTEMI, Chronic Venous Insufficiency of BLE, Pulmonary Embolism, OA and Obesity presents for 3 month routine follow up, medication check and follow up labs. Complying routinely w/ medication regimen and tolerating w/o reaction or side effects. Preferentially does not check BS's. No signs/symptoms of hyper or hypoglycemia.   Diet: nothing special, admits eating habits are not good  Caffeine: 1 bottle of 16-20 oz of Pepsi a day;1 glass of sweet tea 3 x a week, 1 cup of coffee 2-3 x a month, occ hot tea or green tea  Exercise: none, pretty sedentary, uses a walking cane to get around  Weight: staying in the 220's-230's over time       REVIEW OF SYMPTOMS   Review of Systems   Constitutional: Negative.    Respiratory:  Negative for cough, shortness of breath and wheezing.    Cardiovascular:  Negative for chest pain.   Genitourinary: Negative.    Musculoskeletal:  Negative for myalgias.   Neurological: Negative.        PROBLEM LIST/MEDICAL HISTORY     Patient Active Problem List    Diagnosis Date Noted    Atrial fibrillation (HCC) 02/28/2025    HERNANDEZ (dyspnea on exertion) 02/28/2025    Pulmonary HTN (HCC) 02/28/2025    Rheumatoid arthritis (HCC) 02/28/2025    AVNRT (AV nhung re-entry tachycardia) 11/20/2023    History of atrial fibrillation 08/24/2023     Overview Note:     [facility] 3/3/21-3/6/21 S/p cardioversion w/ IV Adenosine. Stable on increased regimen of Toprol. EP Cardiology follow up 3/30/21 at [facility] w/ Dr. Dash EP; AVNRT s/p ablation Dr. Dash 5/2021      History of pulmonary embolism 08/24/2023     Overview Note:     CJW 8/3/23-8/7/23; + VQ Scan; started on Eliquis 5 mg, bid; Pulmonary Dr. Lancaster

## 2025-04-27 ENCOUNTER — RESULTS FOLLOW-UP (OUTPATIENT)
Age: 80
End: 2025-04-27

## 2025-06-03 ENCOUNTER — OFFICE VISIT (OUTPATIENT)
Age: 80
End: 2025-06-03
Payer: MEDICARE

## 2025-06-03 VITALS
HEIGHT: 65 IN | RESPIRATION RATE: 16 BRPM | SYSTOLIC BLOOD PRESSURE: 136 MMHG | WEIGHT: 223 LBS | DIASTOLIC BLOOD PRESSURE: 86 MMHG | HEART RATE: 91 BPM | TEMPERATURE: 97.7 F | OXYGEN SATURATION: 98 % | BODY MASS INDEX: 37.15 KG/M2

## 2025-06-03 DIAGNOSIS — Z86.79 HISTORY OF ATRIAL FIBRILLATION: ICD-10-CM

## 2025-06-03 DIAGNOSIS — E11.22 TYPE 2 DIABETES MELLITUS WITH STAGE 3A CHRONIC KIDNEY DISEASE, WITHOUT LONG-TERM CURRENT USE OF INSULIN (HCC): ICD-10-CM

## 2025-06-03 DIAGNOSIS — I87.2 VENOUS INSUFFICIENCY OF BOTH LOWER EXTREMITIES: ICD-10-CM

## 2025-06-03 DIAGNOSIS — M13.0 ARTHRITIS OF MULTIPLE SITES: ICD-10-CM

## 2025-06-03 DIAGNOSIS — E78.2 MIXED HYPERLIPIDEMIA: ICD-10-CM

## 2025-06-03 DIAGNOSIS — I25.2 HISTORY OF NON-ST ELEVATION MYOCARDIAL INFARCTION (NSTEMI): ICD-10-CM

## 2025-06-03 DIAGNOSIS — I10 BENIGN ESSENTIAL HYPERTENSION: ICD-10-CM

## 2025-06-03 DIAGNOSIS — Z95.0 PACEMAKER: ICD-10-CM

## 2025-06-03 DIAGNOSIS — Z86.79 S/P ABLATION OF ATRIAL FIBRILLATION: ICD-10-CM

## 2025-06-03 DIAGNOSIS — N18.31 TYPE 2 DIABETES MELLITUS WITH STAGE 3A CHRONIC KIDNEY DISEASE, WITHOUT LONG-TERM CURRENT USE OF INSULIN (HCC): ICD-10-CM

## 2025-06-03 DIAGNOSIS — Z09 HOSPITAL DISCHARGE FOLLOW-UP: Primary | ICD-10-CM

## 2025-06-03 DIAGNOSIS — Z98.890 S/P ABLATION OF ATRIAL FIBRILLATION: ICD-10-CM

## 2025-06-03 DIAGNOSIS — I44.1 SECOND DEGREE HEART BLOCK: ICD-10-CM

## 2025-06-03 PROCEDURE — 3075F SYST BP GE 130 - 139MM HG: CPT | Performed by: FAMILY MEDICINE

## 2025-06-03 PROCEDURE — 3051F HG A1C>EQUAL 7.0%<8.0%: CPT | Performed by: FAMILY MEDICINE

## 2025-06-03 PROCEDURE — 1160F RVW MEDS BY RX/DR IN RCRD: CPT | Performed by: FAMILY MEDICINE

## 2025-06-03 PROCEDURE — 1126F AMNT PAIN NOTED NONE PRSNT: CPT | Performed by: FAMILY MEDICINE

## 2025-06-03 PROCEDURE — 99215 OFFICE O/P EST HI 40 MIN: CPT | Performed by: FAMILY MEDICINE

## 2025-06-03 PROCEDURE — 1159F MED LIST DOCD IN RCRD: CPT | Performed by: FAMILY MEDICINE

## 2025-06-03 PROCEDURE — 3079F DIAST BP 80-89 MM HG: CPT | Performed by: FAMILY MEDICINE

## 2025-06-03 PROCEDURE — 1123F ACP DISCUSS/DSCN MKR DOCD: CPT | Performed by: FAMILY MEDICINE

## 2025-06-03 RX ORDER — METHOCARBAMOL 500 MG/1
500 TABLET, FILM COATED ORAL 4 TIMES DAILY PRN
COMMUNITY

## 2025-06-03 RX ORDER — TRAMADOL HYDROCHLORIDE 50 MG/1
50 TABLET ORAL EVERY 6 HOURS PRN
COMMUNITY

## 2025-06-03 ASSESSMENT — ENCOUNTER SYMPTOMS
COUGH: 0
GASTROINTESTINAL NEGATIVE: 1
WHEEZING: 0
SHORTNESS OF BREATH: 0
CHEST TIGHTNESS: 0

## 2025-06-03 NOTE — PATIENT INSTRUCTIONS
[facility]  [phone]    [facility]   Dr. Cinthia Villalpando et al  [phone]    [facility] ([facility]  Nawaf Zhang, et al  [address] [facility] [address]  [phone]     [facility] [place]   [phone]    [facility]   [phone]

## 2025-06-03 NOTE — PROGRESS NOTES
Chief Complaint   Patient presents with    Follow-Up from [facility]5/28/25     1. \"Have you been to the ER, urgent care clinic since your last visit?  Hospitalized since your last visit?\" Yes [facility]    2. \"Have you seen or consulted any other health care providers outside of the [facility] since your last visit?\" No eye exam [facility] on [address]    3. For patients aged 45-75: Has the patient had a colonoscopy / FIT/ Cologuard? NA - based on age 2017      If the patient is female:    4. For patients aged 40-74: Has the patient had a mammogram within the past 2 years? Yes - no Care Gap present 1/2025      5. For patients aged 21-65: Has the patient had a pap smear? NA - based on age or sex

## 2025-06-03 NOTE — PROGRESS NOTES
Chief Complaint   Patient presents with    Follow-Up from Hospital     [facility] 5/24/25-5/28/25 Heart Block     HISTORY OF PRESENT ILLNESS   HPI  Patient with history of Type 2 NIDDM, Stage 3a CKD, Mixed Hyperlipidemia, Benign Essential Hypertension, Atrial Fibrillation s/p Ablation, NSTEMI, Chronic Venous Insufficiency of BLE, Pulmonary Embolism, OA and Obesity presents accompanied by daughter in law for hospital follow up from [facility] 5/24/25-5/28/25 for heart block s/p pacemaker placement.     Today's history obtained by patient self report and daughter in law. Other history obtained per review of the limited available pertinent hospital records, reports, discharge/after visit summary. Pertinent findings summarized in today's office note.    Patient presented to [facility] ER 5/23 via EMS with complaints of 1 week history of progressively worsening weakness, dyspnea on exertion, and body aches/joint pain.  She was out at lunch with her family on the day of 523 when her weakness had become so severe that she could barely raise the fork to her mouth or move any of her body parts.  She became very weak, presyncopal and short of breath.  Son called 911 and she was transported directly to [facility].  She was placed on telemetry and her heart rates were staying in the 40s.  Workup including CTA and BNP were negative for PE and CHF.  She was given IV Lasix treatments for significant generalized fluid retention especially in the lower extremities.  She underwent pacemaker placement on 5/27.  Also given her diffuse arthralgias she was treated with steroids and narcotics throughout hospital course.  The hospitalist raised concern about high suspicion for rheumatoid arthritis and recommended that she follow-up with the rheumatologist outpatient.      She was discharged home on 5/28 in stable condition. Her Metoprolol and Imdur were stopped. She was prescribed Medrol dose pack taper at discharge, and she takes the last dose tomorrow.  She was

## 2025-06-06 ENCOUNTER — TELEPHONE (OUTPATIENT)
Age: 80
End: 2025-06-06

## 2025-06-06 NOTE — TELEPHONE ENCOUNTER
Roula from [employer] is requesting a call back to talk about PT's Home health needs that the PT discussed with [employer] CB#[phone]

## 2025-06-09 NOTE — TELEPHONE ENCOUNTER
Spoke to Roula.  She is with [employer].  She reports that [employer] a a case management for pt's [employer].  Roula had called pt for a f/u to most recent hospital stay.  Roula said that pt mentioned that hospital recommended physical therapy but pt hasn't heard anything.  Roula was calling us to see if Dr Hopkins would be ordering PT or was it Cardiology.  Advised that it was probably cardiologist since Dr Hopkins hadn't seen pt at the hospital.  Advised that I would need to check with Dr Hopkins to see if we need to contact cardiologist for them to order PT.  Roula also said that dtr n law. Noted that they had removed the dressing on a wound and wanted to know if they could put aquaphor on the area.  Roula didn't know where the wound was or the size..

## 2025-07-21 ENCOUNTER — OFFICE VISIT (OUTPATIENT)
Age: 80
End: 2025-07-21
Payer: MEDICARE

## 2025-07-21 VITALS
HEIGHT: 60 IN | TEMPERATURE: 97.6 F | OXYGEN SATURATION: 98 % | RESPIRATION RATE: 16 BRPM | DIASTOLIC BLOOD PRESSURE: 60 MMHG | WEIGHT: 222.4 LBS | BODY MASS INDEX: 43.66 KG/M2 | SYSTOLIC BLOOD PRESSURE: 110 MMHG | HEART RATE: 80 BPM

## 2025-07-21 DIAGNOSIS — Z86.79 HISTORY OF ATRIAL FIBRILLATION: ICD-10-CM

## 2025-07-21 DIAGNOSIS — E78.2 MIXED HYPERLIPIDEMIA: ICD-10-CM

## 2025-07-21 DIAGNOSIS — N18.31 TYPE 2 DIABETES MELLITUS WITH STAGE 3A CHRONIC KIDNEY DISEASE, WITHOUT LONG-TERM CURRENT USE OF INSULIN (HCC): ICD-10-CM

## 2025-07-21 DIAGNOSIS — I10 BENIGN ESSENTIAL HYPERTENSION: ICD-10-CM

## 2025-07-21 DIAGNOSIS — I87.2 VENOUS INSUFFICIENCY OF BOTH LOWER EXTREMITIES: ICD-10-CM

## 2025-07-21 DIAGNOSIS — I44.1 SECOND DEGREE HEART BLOCK: ICD-10-CM

## 2025-07-21 DIAGNOSIS — I25.2 HISTORY OF NON-ST ELEVATION MYOCARDIAL INFARCTION (NSTEMI): ICD-10-CM

## 2025-07-21 DIAGNOSIS — E11.22 TYPE 2 DIABETES MELLITUS WITH STAGE 3A CHRONIC KIDNEY DISEASE, WITHOUT LONG-TERM CURRENT USE OF INSULIN (HCC): ICD-10-CM

## 2025-07-21 DIAGNOSIS — Z00.00 MEDICARE ANNUAL WELLNESS VISIT, SUBSEQUENT: Primary | ICD-10-CM

## 2025-07-21 DIAGNOSIS — Z86.711 HISTORY OF PULMONARY EMBOLISM: ICD-10-CM

## 2025-07-21 PROCEDURE — 3078F DIAST BP <80 MM HG: CPT | Performed by: FAMILY MEDICINE

## 2025-07-21 PROCEDURE — 3051F HG A1C>EQUAL 7.0%<8.0%: CPT | Performed by: FAMILY MEDICINE

## 2025-07-21 PROCEDURE — 3074F SYST BP LT 130 MM HG: CPT | Performed by: FAMILY MEDICINE

## 2025-07-21 PROCEDURE — 1123F ACP DISCUSS/DSCN MKR DOCD: CPT | Performed by: FAMILY MEDICINE

## 2025-07-21 PROCEDURE — 1126F AMNT PAIN NOTED NONE PRSNT: CPT | Performed by: FAMILY MEDICINE

## 2025-07-21 PROCEDURE — 99213 OFFICE O/P EST LOW 20 MIN: CPT | Performed by: FAMILY MEDICINE

## 2025-07-21 PROCEDURE — 1159F MED LIST DOCD IN RCRD: CPT | Performed by: FAMILY MEDICINE

## 2025-07-21 PROCEDURE — G0439 PPPS, SUBSEQ VISIT: HCPCS | Performed by: FAMILY MEDICINE

## 2025-07-21 PROCEDURE — 1160F RVW MEDS BY RX/DR IN RCRD: CPT | Performed by: FAMILY MEDICINE

## 2025-07-21 ASSESSMENT — PATIENT HEALTH QUESTIONNAIRE - PHQ9
SUM OF ALL RESPONSES TO PHQ QUESTIONS 1-9: 0
1. LITTLE INTEREST OR PLEASURE IN DOING THINGS: NOT AT ALL
2. FEELING DOWN, DEPRESSED OR HOPELESS: NOT AT ALL
SUM OF ALL RESPONSES TO PHQ QUESTIONS 1-9: 0

## 2025-07-21 ASSESSMENT — ENCOUNTER SYMPTOMS
RESPIRATORY NEGATIVE: 1
DIARRHEA: 0
CONSTIPATION: 0
GASTROINTESTINAL NEGATIVE: 1
NAUSEA: 0
BLOOD IN STOOL: 0
VOMITING: 0
ABDOMINAL PAIN: 0

## 2025-07-21 NOTE — PROGRESS NOTES
Chief Complaint   Patient presents with    Medicare AWV       Pt is fasting    Diabetes    Cholesterol Problem    Hypertension       1. \"Have you been to the ER, urgent care clinic since your last visit?  Hospitalized since your last visit?\" No    2. \"Have you seen or consulted any other health care providers outside of the [facility] since your last visit?\" No    3. For patients aged 45-75: Has the patient had a colonoscopy / FIT/ Cologuard? Yes - no Care Gap present, 2017, 10 year f/u      If the patient is female:    4. For patients aged 40-74: Has the patient had a mammogram within the past 2 years? Yes - no Care Gap present, 1/2025      5. For patients aged 21-65: Has the patient had a pap smear? No                Click Here for Release of Records Request           7/21/2025    10:49 AM   PHQ-9    Little interest or pleasure in doing things 0   Feeling down, depressed, or hopeless 0   PHQ-2 Score 0   PHQ-9 Total Score 0           Financial Resource Strain: Low Risk  (12/11/2024)    Overall Financial Resource Strain (CARDIA)     Difficulty of Paying Living Expenses: Not hard at all      Food Insecurity: No Food Insecurity (1/21/2025)    Hunger Vital Sign     Worried About Running Out of Food in the Last Year: Never true     Ran Out of Food in the Last Year: Never true          Health Maintenance Due   Topic Date Due    DEXA (modify frequency per FRAX score)  Never done    Diabetic retinal exam  01/23/2020    Diabetic foot exam  11/14/2024    Shingles vaccine (3 of 3) 06/23/2025    Annual Wellness Visit (Medicare)  07/17/2025      
due to second degree heart block and pacemaker placement 5/27/25. Followed by EP Cardiology Dr. Dash at [facility]      History of pulmonary embolism 08/24/2023     Overview Note:     [facility] 8/3/23-8/7/23; + VQ Scan; started on Eliquis 5 mg, bid; Pulmonary Dr. Lancaster      Severe obesity (BMI 35.0-39.9) with comorbidity ([facility]) 05/16/2023    Type 2 diabetes mellitus with stage 3a chronic kidney disease, without long-term current use of insulin ([facility]) 11/11/2021     Overview Note:     Diagnosed Type 2 NIDDM 5/2012, HgbA1c 6.5; CKD 2012; Dr Guerin et al; Renal US 2/2013; follow up prn; [facility] [place] [facility]         History of supraventricular tachycardia 03/24/2021     Overview Note:     [facility] 3/3/21-3/6/21 S/p cardioversion w/ IV Adenosine. Stable on increased regimen of Toprol. EP Cardiology follow up 3/30/21 at [facility] w/ Dr. 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 Niño; Cardiac Cath [facility] 8/3/23 for chest pain: mild LAD bridging, no obstructive coronary artery disease        Venous insufficiency of both lower extremities 12/28/2020    Venous stasis dermatitis of both lower extremities 12/28/2020    History of angina 09/14/2020     Overview Note:     1/2012, Dr Doherty; Cardiac cath negative, GI related; [facility] , Cardiac Cath, reportedly mild CAD, Dr. Niño      ACP (advance care planning) 05/02/2017     Overview Note:     Initial ACP discussion 5-2017. AMD form reviewed and copy provided.   NN/facilitator to discuss with patient. Discussion and info again 5-2018        HTN (hypertension), benign 05/16/2016     Overview Note:     DX 4/09        Vitamin D deficiency 02/28/2013     Overview Note:     1/2013; weekly rx vit D per Nephrology        Dyspepsia 05/16/2012     Overview Note:

## 2025-07-21 NOTE — PATIENT INSTRUCTIONS
motion in joints and muscles.  Includes upper arm stretches, calf stretches, and gentle yoga.  Aim for at least twice a week, preferably after your muscles are warmed up from other activities.  It can help you function better in daily life.  Balancing.  This helps you stay coordinated and have good posture.  Includes heel-to-toe walking, danish chi, and certain types of yoga.  Aim for at least 3 days a week.  It can reduce your risk of falling.  Even if you have a hard time meeting the recommendations, it's better to be more active than less active. All activity done in each category counts toward your weekly total. You'd be surprised how daily things like carrying groceries, keeping up with grandchildren, and taking the stairs can add up.  What keeps you from being active?  If you've had a hard time being more active, you're not alone. Maybe you remember being able to do more. Or maybe you've never thought of yourself as being active. It's frustrating when you can't do the things you want. Being more active can help. What's holding you back?  Getting started.  Have a goal, but break it into easy tasks. Small steps build into big accomplishments.  Staying motivated.  If you feel like skipping your activity, remember your goal. Maybe you want to move better and stay independent. Every activity gets you one step closer.  Not feeling your best.  Start with 5 minutes of an activity you enjoy. Prove to yourself you can do it. As you get comfortable, increase your time.  You may not be where you want to be. But you're in the process of getting there. Everyone starts somewhere.  How can you find safe ways to stay active?  Talk with your doctor about any physical challenges you're facing. Make a plan with your doctor if you have a health problem or aren't sure how to get started with activity.  If you're already active, ask your doctor if there is anything you should change to stay safe as your body and health change.  If you

## 2025-07-22 LAB
EST. AVERAGE GLUCOSE BLD GHB EST-MCNC: 163 MG/DL
HBA1C MFR BLD: 7.3 % (ref 4–5.6)

## 2025-07-26 ENCOUNTER — RESULTS FOLLOW-UP (OUTPATIENT)
Age: 80
End: 2025-07-26

## 2025-07-26 DIAGNOSIS — N18.31 TYPE 2 DIABETES MELLITUS WITH STAGE 3A CHRONIC KIDNEY DISEASE, WITHOUT LONG-TERM CURRENT USE OF INSULIN (HCC): Primary | ICD-10-CM

## 2025-07-26 DIAGNOSIS — E11.22 TYPE 2 DIABETES MELLITUS WITH STAGE 3A CHRONIC KIDNEY DISEASE, WITHOUT LONG-TERM CURRENT USE OF INSULIN (HCC): Primary | ICD-10-CM

## 2025-07-26 NOTE — TELEPHONE ENCOUNTER
Advise patient her HgbA1c (3 month sugar average) has continued to increase over time and is now at the highest reading she has had over the last several years. I do recommend starting her on treatment for her blood sugar at this time. Due to her chronic kidney disease and heart issues, I would advise against Metformin. My recommendation would be for either 1) Jardiance a once a day pill that is good for blood sugar, weight control, heart & kidney benefits but we would need to see if her insurance covers; 2) Once weekly injection such as Trulicity, Ozempic, etc whichever her insurance would cover best; or if insurance doesn't cover either of those then my last recommendation would be 3) Glimepiride 2 mg once daily pill (cheapest and usually covered best by insurance). Tell her to check insurance coverage for option 1, 2 first. Then call us back w/ update and we will go from there.

## 2025-07-30 RX ORDER — GLIMEPIRIDE 2 MG/1
2 TABLET ORAL
Qty: 30 TABLET | Refills: 0 | Status: SHIPPED | OUTPATIENT
Start: 2025-07-30

## 2025-07-31 NOTE — TELEPHONE ENCOUNTER
----- Message from ALBERT HARTMANN LPN sent at 2025  5:57 PM EDT -----  Regarding: Results  Called patient. Name and  confirmed. Conveyed lab results per provider's note. Patient verbalized understanding.   Pt care taker would like to know if she can get a dexcom meter or freestyle ada. No injections. Requesting Jardiance ,or Glimperide 2mg . But right now would like to start with Glimperide at this time. Please send rx to [facility] on [address]. Thank you  ----- Message -----  From: Rebeca Mendoza MD  Sent: 2025   1:47 PM EDT  To: Bryan Mai Clinical Staff

## 2025-07-31 NOTE — TELEPHONE ENCOUNTER
They would need to check w/ insurance about coverage for the Dexcom or Freestyle. Tell them to ask about coverage for the Jardiance while they are talking w/ insurance. We can start w/ the Glimepiride but preferably would benefit more from the Jardiance if they will cover it. I will send in 30 day supply of the Amaryl for now for her to try and wait to hear back from them about the other. I will want her to follow up in 3 months regardless.

## 2025-07-31 NOTE — TELEPHONE ENCOUNTER
Called and spoke to patient, two ID confirmed.  Pt will check with insurance about coverage for Dexcom or Freestyle.  Pt has already spoken to insurance about Jardiance and glimepiride, and both are covered, but will be cheaper if she uses [facility] pharmacy.  Pt will  Amaryl from [employer]. Pt would like to know whether Jardiance or glimepiride has worse side effects.

## 2025-08-01 NOTE — TELEPHONE ENCOUNTER
Called and spoke to patient, two ID confirmed.  Relayed PCP information about side effects. Pt will continue with the Amaryl for now and see how it goes.  Writer reminded pt to check with insurance about Dexcom and Freestyle sensor coverage.

## 2025-08-27 DIAGNOSIS — E11.22 TYPE 2 DIABETES MELLITUS WITH STAGE 3A CHRONIC KIDNEY DISEASE, WITHOUT LONG-TERM CURRENT USE OF INSULIN (HCC): ICD-10-CM

## 2025-08-27 DIAGNOSIS — N18.31 TYPE 2 DIABETES MELLITUS WITH STAGE 3A CHRONIC KIDNEY DISEASE, WITHOUT LONG-TERM CURRENT USE OF INSULIN (HCC): ICD-10-CM

## 2025-08-29 RX ORDER — GLIMEPIRIDE 2 MG/1
2 TABLET ORAL
Qty: 90 TABLET | Refills: 0 | Status: SHIPPED | OUTPATIENT
Start: 2025-08-29